# Patient Record
Sex: MALE | Race: WHITE | NOT HISPANIC OR LATINO | Employment: OTHER | URBAN - METROPOLITAN AREA
[De-identification: names, ages, dates, MRNs, and addresses within clinical notes are randomized per-mention and may not be internally consistent; named-entity substitution may affect disease eponyms.]

---

## 2018-07-02 DIAGNOSIS — I10 ESSENTIAL HYPERTENSION, BENIGN: ICD-10-CM

## 2018-07-02 DIAGNOSIS — I48.0 PAROXYSMAL ATRIAL FIBRILLATION (HCC): Primary | ICD-10-CM

## 2018-07-04 RX ORDER — AMLODIPINE BESYLATE 5 MG/1
TABLET ORAL
Qty: 180 TABLET | Refills: 0 | Status: SHIPPED | OUTPATIENT
Start: 2018-07-04 | End: 2018-09-12 | Stop reason: SDUPTHER

## 2018-07-04 RX ORDER — LISINOPRIL 20 MG/1
TABLET ORAL
Qty: 180 TABLET | Refills: 0 | Status: SHIPPED | OUTPATIENT
Start: 2018-07-04 | End: 2018-09-12 | Stop reason: SDUPTHER

## 2018-08-11 ENCOUNTER — TRANSCRIBE ORDERS (OUTPATIENT)
Dept: ADMINISTRATIVE | Facility: HOSPITAL | Age: 63
End: 2018-08-11

## 2018-08-11 ENCOUNTER — APPOINTMENT (OUTPATIENT)
Dept: LAB | Facility: HOSPITAL | Age: 63
End: 2018-08-11
Payer: COMMERCIAL

## 2018-08-11 DIAGNOSIS — I10 ESSENTIAL HYPERTENSION, MALIGNANT: ICD-10-CM

## 2018-08-11 DIAGNOSIS — G89.29 CHRONIC RIGHT SHOULDER PAIN: ICD-10-CM

## 2018-08-11 DIAGNOSIS — M25.511 CHRONIC RIGHT SHOULDER PAIN: ICD-10-CM

## 2018-08-11 DIAGNOSIS — E55.9 VITAMIN D DEFICIENCY DISEASE: Primary | ICD-10-CM

## 2018-08-11 DIAGNOSIS — E78.2 MIXED HYPERLIPIDEMIA: ICD-10-CM

## 2018-08-11 DIAGNOSIS — R35.1 NOCTURIA: ICD-10-CM

## 2018-08-11 DIAGNOSIS — E55.9 VITAMIN D DEFICIENCY DISEASE: ICD-10-CM

## 2018-08-11 DIAGNOSIS — F41.9 ANXIETY DISORDER, UNSPECIFIED TYPE: ICD-10-CM

## 2018-08-11 DIAGNOSIS — I48.0 PAROXYSMAL ATRIAL FIBRILLATION (HCC): ICD-10-CM

## 2018-08-11 LAB
25(OH)D3 SERPL-MCNC: 66.5 NG/ML (ref 30–100)
ALBUMIN SERPL BCP-MCNC: 4.1 G/DL (ref 3–5.2)
ALP SERPL-CCNC: 73 U/L (ref 43–122)
ALT SERPL W P-5'-P-CCNC: 25 U/L (ref 9–52)
ANION GAP SERPL CALCULATED.3IONS-SCNC: 7 MMOL/L (ref 5–14)
AST SERPL W P-5'-P-CCNC: 13 U/L (ref 17–59)
BASOPHILS # BLD AUTO: 0.1 THOUSANDS/ΜL (ref 0–0.1)
BASOPHILS NFR BLD AUTO: 1 % (ref 0–1)
BILIRUB SERPL-MCNC: 0.9 MG/DL
BILIRUB UR QL STRIP: NEGATIVE
BUN SERPL-MCNC: 16 MG/DL (ref 5–25)
CALCIUM SERPL-MCNC: 9.5 MG/DL (ref 8.4–10.2)
CHLORIDE SERPL-SCNC: 105 MMOL/L (ref 97–108)
CHOLEST SERPL-MCNC: 115 MG/DL
CLARITY UR: CLEAR
CO2 SERPL-SCNC: 30 MMOL/L (ref 22–30)
COLOR UR: YELLOW
CREAT SERPL-MCNC: 0.63 MG/DL (ref 0.7–1.5)
EOSINOPHIL # BLD AUTO: 0.2 THOUSAND/ΜL (ref 0–0.4)
EOSINOPHIL NFR BLD AUTO: 2 % (ref 0–6)
ERYTHROCYTE [DISTWIDTH] IN BLOOD BY AUTOMATED COUNT: 13.2 %
ERYTHROCYTE [SEDIMENTATION RATE] IN BLOOD: 5 MM/HOUR (ref 1–20)
GFR SERPL CREATININE-BSD FRML MDRD: 106 ML/MIN/1.73SQ M
GLUCOSE P FAST SERPL-MCNC: 96 MG/DL (ref 70–99)
GLUCOSE UR STRIP-MCNC: NEGATIVE MG/DL
HCT VFR BLD AUTO: 47.7 % (ref 41–53)
HDLC SERPL-MCNC: 22 MG/DL (ref 40–59)
HGB BLD-MCNC: 16.4 G/DL (ref 13.5–17.5)
HGB UR QL STRIP.AUTO: NEGATIVE
KETONES UR STRIP-MCNC: NEGATIVE MG/DL
LDLC SERPL CALC-MCNC: 77 MG/DL
LEUKOCYTE ESTERASE UR QL STRIP: NEGATIVE
LYMPHOCYTES # BLD AUTO: 1.8 THOUSANDS/ΜL (ref 0.5–4)
LYMPHOCYTES NFR BLD AUTO: 18 % (ref 20–50)
MAGNESIUM SERPL-MCNC: 2.3 MG/DL (ref 1.6–2.3)
MCH RBC QN AUTO: 32.4 PG (ref 26–34)
MCHC RBC AUTO-ENTMCNC: 34.4 G/DL (ref 31–36)
MCV RBC AUTO: 94 FL (ref 80–100)
MONOCYTES # BLD AUTO: 0.6 THOUSAND/ΜL (ref 0.2–0.9)
MONOCYTES NFR BLD AUTO: 6 % (ref 1–10)
NEUTROPHILS # BLD AUTO: 7 THOUSANDS/ΜL (ref 1.8–7.8)
NEUTS SEG NFR BLD AUTO: 73 % (ref 45–65)
NITRITE UR QL STRIP: NEGATIVE
NONHDLC SERPL-MCNC: 93 MG/DL
NT-PROBNP SERPL-MCNC: 291 PG/ML (ref 0–299)
PH UR STRIP.AUTO: 7 [PH] (ref 4.5–8)
PLATELET # BLD AUTO: 325 THOUSANDS/UL (ref 150–450)
PMV BLD AUTO: 7.9 FL (ref 8.9–12.7)
POTASSIUM SERPL-SCNC: 4.5 MMOL/L (ref 3.6–5)
PROT SERPL-MCNC: 7.6 G/DL (ref 5.9–8.4)
PROT UR STRIP-MCNC: NEGATIVE MG/DL
PSA SERPL-MCNC: 1.8 NG/ML (ref 0–4)
RBC # BLD AUTO: 5.06 MILLION/UL (ref 4.5–5.9)
SODIUM SERPL-SCNC: 142 MMOL/L (ref 137–147)
SP GR UR STRIP.AUTO: 1.01 (ref 1–1.04)
TRIGL SERPL-MCNC: 79 MG/DL
TSH SERPL DL<=0.05 MIU/L-ACNC: 0.6 UIU/ML (ref 0.47–4.68)
URATE SERPL-MCNC: 5.8 MG/DL (ref 3.5–8.5)
UROBILINOGEN UA: NEGATIVE MG/DL
WBC # BLD AUTO: 9.7 THOUSAND/UL (ref 4.5–11)

## 2018-08-11 PROCEDURE — 85652 RBC SED RATE AUTOMATED: CPT

## 2018-08-11 PROCEDURE — 84550 ASSAY OF BLOOD/URIC ACID: CPT

## 2018-08-11 PROCEDURE — 82306 VITAMIN D 25 HYDROXY: CPT

## 2018-08-11 PROCEDURE — 83880 ASSAY OF NATRIURETIC PEPTIDE: CPT

## 2018-08-11 PROCEDURE — 80053 COMPREHEN METABOLIC PANEL: CPT

## 2018-08-11 PROCEDURE — 84443 ASSAY THYROID STIM HORMONE: CPT

## 2018-08-11 PROCEDURE — 80061 LIPID PANEL: CPT

## 2018-08-11 PROCEDURE — 82542 COL CHROMOTOGRAPHY QUAL/QUAN: CPT

## 2018-08-11 PROCEDURE — 83735 ASSAY OF MAGNESIUM: CPT

## 2018-08-11 PROCEDURE — 81003 URINALYSIS AUTO W/O SCOPE: CPT

## 2018-08-11 PROCEDURE — G0103 PSA SCREENING: HCPCS

## 2018-08-11 PROCEDURE — 85025 COMPLETE CBC W/AUTO DIFF WBC: CPT

## 2018-08-11 PROCEDURE — 36415 COLL VENOUS BLD VENIPUNCTURE: CPT

## 2018-08-13 LAB — FLECAINIDE SERPL-MCNC: 0.16 UG/ML (ref 0.2–1)

## 2018-09-12 DIAGNOSIS — I48.0 PAROXYSMAL ATRIAL FIBRILLATION (HCC): ICD-10-CM

## 2018-09-12 DIAGNOSIS — I10 ESSENTIAL HYPERTENSION, BENIGN: ICD-10-CM

## 2018-09-13 DIAGNOSIS — B00.9 HERPES INFECTION: Primary | ICD-10-CM

## 2018-09-13 RX ORDER — AMLODIPINE BESYLATE 5 MG/1
TABLET ORAL
Qty: 180 TABLET | Refills: 0 | Status: SHIPPED | OUTPATIENT
Start: 2018-09-13 | End: 2019-09-23 | Stop reason: SDUPTHER

## 2018-09-13 RX ORDER — FLECAINIDE ACETATE 100 MG/1
TABLET ORAL
Qty: 180 TABLET | Refills: 0 | Status: SHIPPED | OUTPATIENT
Start: 2018-09-13 | End: 2019-09-23 | Stop reason: SDUPTHER

## 2018-09-13 RX ORDER — LISINOPRIL 20 MG/1
TABLET ORAL
Qty: 180 TABLET | Refills: 0 | Status: SHIPPED | OUTPATIENT
Start: 2018-09-13 | End: 2019-09-23 | Stop reason: SDUPTHER

## 2018-09-13 NOTE — TELEPHONE ENCOUNTER
Patient's left voice message requesting a medication refill for Valtrex 1 gram tablets  He said that Dr Yeny Chavis use to give him this medication a little while ago  Any questions the patient can be at [phone]

## 2018-09-14 RX ORDER — VALACYCLOVIR HYDROCHLORIDE 1 G/1
1000 TABLET, FILM COATED ORAL 2 TIMES DAILY
Qty: 180 TABLET | Refills: 0 | Status: SHIPPED | OUTPATIENT
Start: 2018-09-14 | End: 2018-09-17 | Stop reason: SDUPTHER

## 2018-09-17 DIAGNOSIS — I48.0 PAROXYSMAL ATRIAL FIBRILLATION (HCC): ICD-10-CM

## 2018-09-17 DIAGNOSIS — B00.9 HERPES INFECTION: ICD-10-CM

## 2018-09-17 DIAGNOSIS — I10 ESSENTIAL HYPERTENSION, BENIGN: ICD-10-CM

## 2018-09-17 RX ORDER — ACYCLOVIR 50 MG/G
OINTMENT TOPICAL
Qty: 15 G | Refills: 1 | Status: CANCELLED | OUTPATIENT
Start: 2018-09-17

## 2018-09-18 RX ORDER — VALACYCLOVIR HYDROCHLORIDE 1 G/1
1000 TABLET, FILM COATED ORAL 2 TIMES DAILY
Qty: 180 TABLET | Refills: 1 | Status: SHIPPED | OUTPATIENT
Start: 2018-09-18 | End: 2018-12-17

## 2018-09-25 RX ORDER — LORAZEPAM 1 MG/1
TABLET ORAL EVERY 12 HOURS
COMMUNITY
Start: 2017-11-27 | End: 2018-11-12 | Stop reason: SDUPTHER

## 2018-09-25 RX ORDER — CYANOCOBALAMIN (VITAMIN B-12) 500 MCG
TABLET ORAL
COMMUNITY
End: 2018-09-26 | Stop reason: ALTCHOICE

## 2018-09-25 RX ORDER — KETOCONAZOLE 20 MG/G
CREAM TOPICAL
COMMUNITY
Start: 2018-07-02 | End: 2018-09-26 | Stop reason: ALTCHOICE

## 2018-09-25 RX ORDER — APIXABAN 5 MG/1
TABLET, FILM COATED ORAL
COMMUNITY
Start: 2018-08-27 | End: 2018-09-26 | Stop reason: SDUPTHER

## 2018-09-25 RX ORDER — UBIDECARENONE 75 MG
CAPSULE ORAL
COMMUNITY
Start: 2015-07-01

## 2018-09-25 RX ORDER — BIOTIN 1 MG
5000 TABLET ORAL
COMMUNITY
Start: 2016-07-20

## 2018-09-25 RX ORDER — ASPIRIN 81 MG/1
TABLET ORAL EVERY 24 HOURS
COMMUNITY
Start: 2015-07-29 | End: 2020-04-09

## 2018-09-25 RX ORDER — ATORVASTATIN CALCIUM 40 MG/1
TABLET, FILM COATED ORAL
COMMUNITY
Start: 2018-07-02 | End: 2019-04-10 | Stop reason: SDUPTHER

## 2018-09-26 ENCOUNTER — OFFICE VISIT (OUTPATIENT)
Dept: CARDIOLOGY CLINIC | Facility: CLINIC | Age: 63
End: 2018-09-26
Payer: COMMERCIAL

## 2018-09-26 VITALS
DIASTOLIC BLOOD PRESSURE: 78 MMHG | HEART RATE: 59 BPM | OXYGEN SATURATION: 100 % | BODY MASS INDEX: 29.82 KG/M2 | HEIGHT: 73 IN | WEIGHT: 225 LBS | SYSTOLIC BLOOD PRESSURE: 118 MMHG

## 2018-09-26 DIAGNOSIS — J01.00 ACUTE NON-RECURRENT MAXILLARY SINUSITIS: ICD-10-CM

## 2018-09-26 DIAGNOSIS — E78.2 MIXED HYPERLIPIDEMIA: ICD-10-CM

## 2018-09-26 DIAGNOSIS — I10 ESSENTIAL HYPERTENSION, BENIGN: ICD-10-CM

## 2018-09-26 DIAGNOSIS — F41.9 ANXIETY: ICD-10-CM

## 2018-09-26 DIAGNOSIS — I48.0 PAROXYSMAL ATRIAL FIBRILLATION (HCC): Primary | ICD-10-CM

## 2018-09-26 PROCEDURE — 99214 OFFICE O/P EST MOD 30 MIN: CPT | Performed by: INTERNAL MEDICINE

## 2018-09-26 PROCEDURE — 93000 ELECTROCARDIOGRAM COMPLETE: CPT | Performed by: INTERNAL MEDICINE

## 2018-09-26 RX ORDER — AMOXICILLIN AND CLAVULANATE POTASSIUM 875; 125 MG/1; MG/1
1 TABLET, FILM COATED ORAL EVERY 12 HOURS SCHEDULED
Qty: 14 TABLET | Refills: 1 | Status: SHIPPED | OUTPATIENT
Start: 2018-09-26 | End: 2018-10-03

## 2018-09-26 RX ORDER — APIXABAN 5 MG/1
5 TABLET, FILM COATED ORAL 2 TIMES DAILY
Qty: 60 TABLET | Refills: 11 | Status: SHIPPED | OUTPATIENT
Start: 2018-09-26 | End: 2019-07-17 | Stop reason: SDUPTHER

## 2018-09-26 NOTE — ASSESSMENT & PLAN NOTE
Blood pressure well controlled on current regimen with amlodipine, lisinopril and metoprolol tartrate  We will continue current medical therapy

## 2018-09-26 NOTE — ASSESSMENT & PLAN NOTE
Is on atorvastatin  Lipids are well controlled  LFTs are normal      will continue current medical therapy

## 2018-09-26 NOTE — LETTER
September 26, 2018     Referral [facility]    Patient: Asuncion Weber   YOB: 1955   Date of Visit: 9/26/2018       Dear Dr Argentina Cho:    Thank you for referring Asuncion Weber to me for evaluation  Below are my notes for this consultation  If you have questions, please do not hesitate to call me  I look forward to following your patient along with you  Sincerely,        Carlita Dietz MD        CC: MD Carlita Levy MD  9/26/2018 10:51 AM  [facility]   [facility]   [address] [place] [address] [address]   [address]  [id number]  Phone#  [phone]  Fax#  [phone]  *-*-*-*-*-*-*-*-*-*-*-*-*-*-*-*-*-*-*-*-*-*-*-*-*-*-*-*-*-*-*-*-*-*-*-*-*-*-*-*-*-*-*-*-*-*-*-*-*-*-*-*-*-*    Devere Oas DATE: 09/26/18 10:37 AM    PATIENT NAME: Asuncion Weber   1955    [id number]  Age: 58 y o  Sex: male    AUTHOR: Carlita Dietz MD  PRIMARYCARE PHYSICIAN: Maritza Will MD    DIAGNOSES:  1  Paroxysmal atrial fibrillation status post cardioversion in 2015   2  Mixed dyslipidemia  3  Benign essential hypertension  4  Chronic tobacco use  5  Suspected sleep apnea    Persantine nuclear stress test in July 2015 showed normal perfusion and normal left ventricular systolic function with mildly dilated left ventricular cavity, EF was 58%  Echocardiogram June 2018:  - Mild left ventricular cavity enlargement, normal left  ventricle systolic function and normal diastolic function  EF  approximately 50-55%  - Mild right atrial and mild to moderate left atrial  enlargement  - Aortic valve leaflet sclerosis  No stenosis or regurgitation   - Mitral valve leaflet sclerosis  Trace mitral valve  regurgitation   - Mild tricuspid and pulmonic valve regurgitation   - No pericardial effusion   - No pulmonary hypertension       Transesophageal echocardiogram in June 2015 showed mild-to-moderate left atrial enlargement, borderline reduced left ventricular systolic function  CURRENT ECG:  Results for orders placed or performed in visit on 09/26/18   POCT ECG    Narrative     Sinus bradycardia at 59 beats per minute, normal axis and intervals, QRS duration 98 milliseconds,  milliseconds, no significant ST T wave abnormalities  Slight  Sinus arrhythmia noted  A       CARDIOLOGY ASSESSMENT & PLAN:  No problem-specific Assessment & Plan notes found for this encounter  INTERVAL HISTORY & HISTORY OF PRESENT ILLNESS:  Glenny Albrecht is here for follow-up regarding his cardiac comorbidities which include:  A history of paroxysmal atrial fibrillation status post most recent cardioversion in 2015, history of hypertension and dyslipidemia  He reports overall feeling well with no recent subjective cardiac complaints  Denies any recent chest pain, shortness of breath, dizziness, lightheadedness, orthopnea, PND, pedal edema, presyncope / syncope, or decline in exercise tolerance  Also denies any recent hospitalizations or other illnesses  Reports being compliant with medications  He has lost 7 lb recently and he feels good  He continues to smoke says about 8 cigarettes a day  REVIEW OF SYMPTOMS:    Positive for:  No significant symptoms  Negative for: All remaining as reviewed below and in HPI  SYSTEM SYMPTOMS REVIEWED:  Generalweight change, fever, night sweats  Respirato      Cardiovascularchest pain, syncope, dyspnea on exertion, edema, decline in exercise tolerance, claudication   Gastrointestinalpersistent vomiting, diarrhea, abdominal distention, blood in stool   Muscular or skeletaljoint pain or swelling   Neurologicheadaches, syncope, abnormal movement  Hematologichistory of easy bruising and bleeding   Endocrinethyroid enlargement, heat or cold intolerance, polyuria   Psychiatricanxiety, depression     *-*-*-*-*-*-*-*-*-*-*-*-*-*-*-*-*-*-*-*-*-*-*-*-*-*-*-*-*-*-*-*-*-*-*-*-*-*-*-*-*-*-*-*-*-*-*-*-*-*-*-*-*-*-  VITAL SIGNS:  Vitals:    09/26/18 0947   BP: 118/78   BP Location: Left arm   Patient Position: Sitting   Cuff Size: Large   Pulse: 59   SpO2: 100%   Weight: 102 kg (225 lb)   Height: 6' 1" (1 854 m)       *-*-*-*-*-*-*-*-*-*-*-*-*-*-*-*-*-*-*-*-*-*-*-*-*-*-*-*-*-*-*-*-*-*-*-*-*-*-*-*-*-*-*-*-*-*-*-*-*-*-*-*-*-*-  PHYSICAL EXAM:  General Appearance:    Alert, cooperative, no distress, appears stated age, tall normal built   Head, Eyes, ENT:    No obvious abnormality, moist mucous mebranes  Neck:   Supple, no carotid bruit or JVD   Back:     Symmetric, no curvature  Lungs:     Respirations unlabored  Clear to auscultation bilaterally,    Chest wall:    No tenderness or deformity   Heart:    Regular rate and rhythm, S1 and S2 normal, no murmur, rub  or gallop  Abdomen:     Soft, non-tender, No obvious masses, or organomegaly   Extremities:   Extremities normal, no cyanosis or edema    Skin:   Skin color, texture, turgor normal, no rashes or lesions     *-*-*-*-*-*-*-*-*-*-*-*-*-*-*-*-*-*-*-*-*-*-*-*-*-*-*-*-*-*-*-*-*-*-*-*-*-*-*-*-*-*-*-*-*-*-*-*-*-*-*-*-*-*-  CURRENT MEDICATION LIST:    Current Outpatient Prescriptions:     amLODIPine (NORVASC) 5 mg tablet, TAKE 1 TABLET BY MOUTH TWICE DAILY  , Disp: 180 tablet, Rfl: 0    aspirin (ASPIR-81) 81 mg EC tablet, every 24 hours, Disp: , Rfl:     atorvastatin (LIPITOR) 40 mg tablet, , Disp: , Rfl:     Cholecalciferol (VITAMIN D3) 1000 units CAPS, take 1 tablet by oral route  every morning, Disp: , Rfl:     coenzyme Q-10 100 MG capsule, Take one tablet by mouth daily  , Disp: , Rfl:     Cyanocobalamin ER (SV VITAMIN B-12 ER) 1000 MCG TBCR, 1 daily, Disp: , Rfl:     ELIQUIS 5 MG, , Disp: , Rfl:     flecainide (TAMBOCOR) 100 mg tablet, TAKE ONE TABLET BY MOUTH EVERY 12 HOURS, Disp: 180 tablet, Rfl: 0    lisinopril (ZESTRIL) 20 mg tablet, TAKE 1 TABLET BY MOUTH TWICE DAILY  , Disp: 180 tablet, Rfl: 0    LORazepam (ATIVAN) 1 mg tablet, Every 12 hours, Disp: , Rfl:    metoprolol tartrate (LOPRESSOR) 25 mg tablet, TAKE ONE TABLET BY MOUTH 2 TIMES A DAY, Disp: 180 tablet, Rfl: 0    Multiple Vitamins-Minerals (MULTIVITAMIN ADULT PO), take 1 by Oral route once, Disp: , Rfl:     selenium 200 mcg, take one and a half tablets every day, Disp: , Rfl:     valACYclovir (VALTREX) 1,000 mg tablet, Take 1 tablet (1,000 mg total) by mouth 2 (two) times a day for 90 days (Patient taking differently: Take 1,000 mg by mouth 2 (two) times a day  ), Disp: 180 tablet, Rfl: 1    ALLERGIES:  Allergies   Allergen Reactions    No Active Allergies        *-*-*-*-*-*-*-*-*-*-*-*-*-*-*-*-*-*-*-*-*-*-*-*-*-*-*-*-*-*-*-*-*-*-*-*-*-*-*-*-*-*-*-*-*-*-*-*-*-*-*-*-*-*-    LABORATORY DATA:  I have personally reviewed pertinent labs  Sodium   Date Value Ref Range Status   08/11/2018 142 137 - 147 mmol/L Final     Potassium   Date Value Ref Range Status   08/11/2018 4 5 3 6 - 5 0 mmol/L Final     Chloride   Date Value Ref Range Status   08/11/2018 105 97 - 108 mmol/L Final     CO2   Date Value Ref Range Status   08/11/2018 30 22 - 30 mmol/L Final     BUN   Date Value Ref Range Status   08/11/2018 16 5 - 25 mg/dL Final     Creatinine   Date Value Ref Range Status   08/11/2018 0 63 (L) 0 70 - 1 50 mg/dL Final     Comment:     Standardized to IDMS reference method     eGFR   Date Value Ref Range Status   08/11/2018 106 >60 ml/min/1 73sq m Final     Calcium   Date Value Ref Range Status   08/11/2018 9 5 8 4 - 10 2 mg/dL Final     AST   Date Value Ref Range Status   08/11/2018 13 (L) 17 - 59 U/L Final     Comment:       Specimen collection should occur prior to Sulfasalazine administration due to the potential for falsely depressed results  ALT   Date Value Ref Range Status   08/11/2018 25 9 - 52 U/L Final     Comment:       Specimen collection should occur prior to Sulfasalazine administration due to the potential for falsely depressed results        Alkaline Phosphatase   Date Value Ref Range Status 08/11/2018 73 43 - 122 U/L Final     Magnesium   Date Value Ref Range Status   08/11/2018 2 3 1 6 - 2 3 mg/dL Final     WBC   Date Value Ref Range Status   08/11/2018 9 70 4 50 - 11 00 Thousand/uL Final     Hemoglobin   Date Value Ref Range Status   08/11/2018 16 4 13 5 - 17 5 g/dL Final     Platelets   Date Value Ref Range Status   08/11/2018 325 150 - 450 Thousands/uL Final     No results found for: PT, PTT, INR  No results found for: CKMB, BNP, DIGOXIN  No results found for: TSH, T4, T3  HDL, Direct   Date Value Ref Range Status   08/11/2018 22 (L) 40 - 59 mg/dL Final     Comment:       HDL Cholesterol:       High    >60 mg/dL       Low     <41 mg/dL  Specimen collection should occur prior to Metamizole administration due to the potential for falsley depressed results  Triglycerides   Date Value Ref Range Status   08/11/2018 79 <150 mg/dL Final     Comment:       Triglyceride:     Normal          <150 mg/dl     Borderline High 150-199 mg/dl     High            200-499 mg/dl        Very High       >499 mg/dl    Specimen collection should occur prior to N-Acetylcysteine or Metamizole administration due to the potential for falsely depressed results        No results found for: HGBA1C  No results found for: Beauty Peon, GRAMSTAIN, URINECX, WOUNDCULT, BODYFLUIDCUL, MRSACULTURE, INFLUAPCR, INFLUBPCR, RSVPCR, LEGIONELLAUR, CDIFFTOXINB    *-*-*-*-*-*-*-*-*-*-*-*-*-*-*-*-*-*-*-*-*-*-*-*-*-*-*-*-*-*-*-*-*-*-*-*-*-*-*-*-*-*-*-*-*-*-*-*-*-*-*-*-*-*-  PREVIOUS CARDIOLOGY & RADIOLOGY RESULTS:  Results for orders placed in visit on 06/19/18   Echo complete with contrast if indicated    Narrative [id number] [place] [address], [phone]    Cardiovascular Report  _________________________________________________________________         Transthoracic Echocardiogram Report  Estheraddison Silveira    MRN:                 [id number]  Account :           [de-identified]  Accession Brain Carlos [id number]  Exam Date:           2018 10:29  Patient Location:    O  Ordering Phys:       NANCY Ferguson)  Attending Phys:      NANCY Ferguson    (Barrett Wilder)  Technologist:        Leah Sheehan    Age:  58             :   1955           Gender:   M  Wt:   236 lb         Ht:    73 in  Indication:  Atrial fibrillation,   Hypertension  BP:         /       HR:    Rhythm:              sinus  Technical Quality:   good      MEASUREMENTS  2D ECHO  Body Surface Area                 2 4 m²  LV Diastolic Diameter PLAX        6 2 cm  LV Systolic Diameter PLAX         4 2 cm  IVS Diastolic Thickness           1 2 cm  LVPW Diastolic Thickness          1 1 cm  LV Relative Wall Thickness        0 4  RV Internal Dim ED PLAX           4 4 cm  Aortic Root Diameter              3 2 cm  LA Systolic Diameter LX           4 8 cm  LA Area                           27 0 cm²  LV Ejection Fraction MOD 4C       60 4 %  RA Area 4C View                   25 0 cm²    DOPPLER  MV Area PHT                       3 5 cm²  Mitral E Point Velocity           90 0 cm/s  Mitral A Point Velocity           60 6 cm/s  Mitral E to A Ratio               1 5  MV E' Velocity                    7 3 cm/s  Mitral E to MV E' Ratio           12 4  TR Peak Velocity                  272 8 cm/s  TR Peak Gradient                  29 8 mmHg  TAPSE                             2 4 cm      FINDINGS  Left Ventricle  Mildly enlarged left ventricle cavity, mild concentric left  ventricular hypertrophy, low-normal left ventricular systolic  function and wall motion  Ejection fraction is estimated at  around 50-55%  Normal diastolic function  Right Ventricle  Normal right ventricle size and systolic function  Right Atrium  Mild right atrial cavity enlargement  Left Atrium  Mild approaching moderate left atrial cavity enlargement  Intact  interatrial septum  Mitral Valve  Mild anterior mitral valve leaflet sclerosis   Trace mitral valve  regurgitation  Aortic Valve  Trileaflet aortic valve with mild sclerosis  No aortic valve  stenosis or regurgitation  Tricuspid Valve  Mild tricuspid valve regurgitation  Pulmonic Valve  Trace to mild pulmonic valve regurgitation  Pericardium  No pericardial effusion  Aorta  Aortic root and proximal ascending aorta are normal in size on  2-D imaging  Additional Findings  Inferior vena cava is normal in size and demonstrates over 50%  collapse with respirations  CONCLUSIONS  - Mild left ventricular cavity enlargement, normal left  ventricle systolic function and normal diastolic function  EF  approximately 50-55%  - Mild right atrial and mild to moderate left atrial  enlargement  - Aortic valve leaflet sclerosis  No stenosis or regurgitation   - Mitral valve leaflet sclerosis  Trace mitral valve  regurgitation   - Mild tricuspid and pulmonic valve regurgitation   - No pericardial effusion   - No pulmonary hypertension  Compared to previous echocardiogram from June 2015 there is  overall no significant change  Ather J Luis  (Electronically Signed)  Final Date:      19 June 2018 18:16  CV Report                    CECILLE OWENS       [id number]  Final                                                     Page 3     No results found for this or any previous visit  No results found for this or any previous visit  No results found for this or any previous visit  Patient was never admitted       *-*-*-*-*-*-*-*-*-*-*-*-*-*-*-*-*-*-*-*-*-*-*-*-*-*-*-*-*-*-*-*-*-*-*-*-*-*-*-*-*-*-*-*-*-*-*-*-*-*-*-*-*-*-  SIGNATURES:   @KYMARIA L@   Charlene Foote MD     CC:   Gisela Tavera MD   Self, Referral   *-*-*-*-*-*-*-*-*-*-*-*-*-*-*-*-*-*-*-*-*-*-*-*-*-*-*-*-*-*-*-*-*-*-*-*-*-*-*-*-*-*-*-*-*-*-*-*-*-*-*-*-*-*-    Social History     Social History    Marital status: /Civil Union     Spouse name: N/A    Number of children: N/A    Years of education: N/A     Occupational History    Not on file      Social History Main Topics    Smoking status: Former Smoker     Packs/day: 20 00     Types: Cigarettes     Quit date: 1/1/2014    Smokeless tobacco: Not on file      Comment: 800 pk yrs as per [employer]    Alcohol use Not on file    Drug use: Unknown    Sexual activity: Not on file     Other Topics Concern    Not on file     Social History Narrative    No narrative on file      Family History   Problem Relation Age of Onset    Heart attack Father 61        MI    Heart attack Paternal Uncle         MI     Past Surgical History:   Procedure Laterality Date    CARDIOVERSION  06/28/2015

## 2018-09-26 NOTE — PATIENT INSTRUCTIONS
CARDIOLOGY ASSESSMENT & PLAN:  Paroxysmal atrial fibrillation (HCC)    He continues to maintain sinus rhythm  Is on flecainide at 100 mg twice daily dose per along with metoprolol  Is on anticoagulation with Eliquis and is tolerating it  ECG today is unremarkable in terms of QT effects of flecainide     - Patient is advised to continue current medical therapy  - Dietary and medical compliance are reinforced  - Advised  to report any concerning symptoms such as chest pain, shortness of breath, decline in exercise tolerance or presyncope/syncope  Describing a course of Augmentin for suspected sinusitis with bronchitis  Essential hypertension, benign    Blood pressure well controlled on current regimen with amlodipine, lisinopril and metoprolol tartrate  We will continue current medical therapy  Mixed hyperlipidemia    Is on atorvastatin  Lipids are well controlled  LFTs are normal      will continue current medical therapy

## 2018-09-26 NOTE — ASSESSMENT & PLAN NOTE
He continues to maintain sinus rhythm  Is on flecainide at 100 mg twice daily dose per along with metoprolol  Is on anticoagulation with Eliquis and is tolerating it  ECG today is unremarkable in terms of QT effects of flecainide     - Patient is advised to continue current medical therapy  - Dietary and medical compliance are reinforced  - Advised  to report any concerning symptoms such as chest pain, shortness of breath, decline in exercise tolerance or presyncope/syncope  Describing a course of Augmentin for suspected sinusitis with bronchitis

## 2018-09-26 NOTE — PROGRESS NOTES
[address]    [address], [address]   [address]  [address]  Phone#  [phone]  Fax#  [phone]  *-*-*-*-*-*-*-*-*-*-*-*-*-*-*-*-*-*-*-*-*-*-*-*-*-*-*-*-*-*-*-*-*-*-*-*-*-*-*-*-*-*-*-*-*-*-*-*-*-*-*-*-*-*    Nicolasa Pain DATE: 09/26/18 10:57 AM    PATIENT NAME: Roselyn Tobar   1955    [id number]  Age: 58 y o  Sex: male    AUTHOR: Carlita Dietz MD  PRIMARYCARE PHYSICIAN: Maribeth Hale MD    DIAGNOSES:  1  Paroxysmal atrial fibrillation status post cardioversion in 2015   2  Mixed dyslipidemia  3  Benign essential hypertension  4  Chronic tobacco use  5  Suspected sleep apnea    Persantine nuclear stress test in July 2015 showed normal perfusion and normal left ventricular systolic function with mildly dilated left ventricular cavity, EF was 58%  Echocardiogram June 2018:  - Mild left ventricular cavity enlargement, normal left  ventricle systolic function and normal diastolic function  EF  approximately 50-55%  - Mild right atrial and mild to moderate left atrial  enlargement  - Aortic valve leaflet sclerosis  No stenosis or regurgitation   - Mitral valve leaflet sclerosis  Trace mitral valve  regurgitation   - Mild tricuspid and pulmonic valve regurgitation   - No pericardial effusion   - No pulmonary hypertension  Transesophageal echocardiogram in June 2015 showed mild-to-moderate left atrial enlargement, borderline reduced left ventricular systolic function  CURRENT ECG:  Results for orders placed or performed in visit on 09/26/18   POCT ECG    Narrative     Sinus bradycardia at 59 beats per minute, normal axis and intervals, QRS duration 98 milliseconds,  milliseconds, no significant ST T wave abnormalities  Slight  Sinus arrhythmia noted  A       CARDIOLOGY ASSESSMENT & PLAN:  Paroxysmal atrial fibrillation (HCC)    He continues to maintain sinus rhythm  Is on flecainide at 100 mg twice daily dose per along with metoprolol    Is on anticoagulation with Eliquis and is tolerating it  ECG today is unremarkable in terms of QT effects of flecainide     - Patient is advised to continue current medical therapy  - Dietary and medical compliance are reinforced  - Advised  to report any concerning symptoms such as chest pain, shortness of breath, decline in exercise tolerance or presyncope/syncope  Describing a course of Augmentin for suspected sinusitis with bronchitis  Essential hypertension, benign    Blood pressure well controlled on current regimen with amlodipine, lisinopril and metoprolol tartrate  We will continue current medical therapy  Mixed hyperlipidemia    Is on atorvastatin  Lipids are well controlled  LFTs are normal      will continue current medical therapy  INTERVAL HISTORY & HISTORY OF PRESENT ILLNESS:  Christie Shafer is here for follow-up regarding his cardiac comorbidities which include:  A history of paroxysmal atrial fibrillation status post most recent cardioversion in 2015, history of hypertension and dyslipidemia  He reports overall feeling well with no recent subjective cardiac complaints  Denies any recent chest pain, shortness of breath, dizziness, lightheadedness, orthopnea, PND, pedal edema, presyncope / syncope, or decline in exercise tolerance  Also denies any recent hospitalizations or other illnesses  Reports being compliant with medications  He has lost 7 lb recently and he feels good  He continues to smoke says about 8 cigarettes a day  He does report recent URI symptoms with sinus congestion and headache the the along with productive sputum    REVIEW OF SYMPTOMS:    Positive for:   URI and sinusitis symptoms  Negative for: All remaining as reviewed below and in HPI  SYSTEM SYMPTOMS REVIEWED:  General--weight change, fever, night sweats  Respirato      Cardiovascular--chest pain, syncope, dyspnea on exertion, edema, decline in exercise tolerance, claudication Gastrointestinal--persistent vomiting, diarrhea, abdominal distention, blood in stool   Muscular or skeletal--joint pain or swelling   Neurologic--headaches, syncope, abnormal movement  Hematologic--history of easy bruising and bleeding   Endocrine--thyroid enlargement, heat or cold intolerance, polyuria   Psychiatric--anxiety, depression     *-*-*-*-*-*-*-*-*-*-*-*-*-*-*-*-*-*-*-*-*-*-*-*-*-*-*-*-*-*-*-*-*-*-*-*-*-*-*-*-*-*-*-*-*-*-*-*-*-*-*-*-*-*-  VITAL SIGNS:  Vitals:    09/26/18 0947   BP: 118/78   BP Location: Left arm   Patient Position: Sitting   Cuff Size: Large   Pulse: 59   SpO2: 100%   Weight: 102 kg (225 lb)   Height: 6' 1" (1 854 m)       *-*-*-*-*-*-*-*-*-*-*-*-*-*-*-*-*-*-*-*-*-*-*-*-*-*-*-*-*-*-*-*-*-*-*-*-*-*-*-*-*-*-*-*-*-*-*-*-*-*-*-*-*-*-  PHYSICAL EXAM:  General Appearance:    Alert, cooperative, no distress, appears stated age, tall normal built   Head, Eyes, ENT:    No obvious abnormality, moist mucous mebranes  Neck:   Supple, no carotid bruit or JVD   Back:     Symmetric, no curvature  Lungs:     Respirations unlabored  Clear to auscultation bilaterally,    Chest wall:    No tenderness or deformity   Heart:    Regular rate and rhythm, S1 and S2 normal, no murmur, rub  or gallop  Abdomen:     Soft, non-tender, No obvious masses, or organomegaly   Extremities:   Extremities normal, no cyanosis or edema    Skin:   Skin color, texture, turgor normal, no rashes or lesions     *-*-*-*-*-*-*-*-*-*-*-*-*-*-*-*-*-*-*-*-*-*-*-*-*-*-*-*-*-*-*-*-*-*-*-*-*-*-*-*-*-*-*-*-*-*-*-*-*-*-*-*-*-*-  CURRENT MEDICATION LIST:    Current Outpatient Prescriptions:     amLODIPine (NORVASC) 5 mg tablet, TAKE 1 TABLET BY MOUTH TWICE DAILY  , Disp: 180 tablet, Rfl: 0    aspirin (ASPIR-81) 81 mg EC tablet, every 24 hours, Disp: , Rfl:     atorvastatin (LIPITOR) 40 mg tablet, , Disp: , Rfl:     Cholecalciferol (VITAMIN D3) 1000 units CAPS, take 1 tablet by oral route  every morning, Disp: , Rfl:     coenzyme Q-10 100 MG capsule, Take one tablet by mouth daily  , Disp: , Rfl:     Cyanocobalamin ER (SV VITAMIN B-12 ER) 1000 MCG TBCR, 1 daily, Disp: , Rfl:     ELIQUIS 5 MG, Take 1 tablet (5 mg total) by mouth 2 (two) times a day, Disp: 60 tablet, Rfl: 11    flecainide (TAMBOCOR) 100 mg tablet, TAKE ONE TABLET BY MOUTH EVERY 12 HOURS, Disp: 180 tablet, Rfl: 0    lisinopril (ZESTRIL) 20 mg tablet, TAKE 1 TABLET BY MOUTH TWICE DAILY  , Disp: 180 tablet, Rfl: 0    LORazepam (ATIVAN) 1 mg tablet, Every 12 hours, Disp: , Rfl:     metoprolol tartrate (LOPRESSOR) 25 mg tablet, TAKE ONE TABLET BY MOUTH 2 TIMES A DAY, Disp: 180 tablet, Rfl: 0    Multiple Vitamins-Minerals (MULTIVITAMIN ADULT PO), take 1 by Oral route once, Disp: , Rfl:     selenium 200 mcg, take one and a half tablets every day, Disp: , Rfl:     valACYclovir (VALTREX) 1,000 mg tablet, Take 1 tablet (1,000 mg total) by mouth 2 (two) times a day for 90 days (Patient taking differently: Take 1,000 mg by mouth 2 (two) times a day  ), Disp: 180 tablet, Rfl: 1    amoxicillin-clavulanate (AUGMENTIN) 875-125 mg per tablet, Take 1 tablet by mouth every 12 (twelve) hours for 7 days, Disp: 14 tablet, Rfl: 1    ALLERGIES:  Allergies   Allergen Reactions    No Active Allergies        *-*-*-*-*-*-*-*-*-*-*-*-*-*-*-*-*-*-*-*-*-*-*-*-*-*-*-*-*-*-*-*-*-*-*-*-*-*-*-*-*-*-*-*-*-*-*-*-*-*-*-*-*-*-    LABORATORY DATA:  I have personally reviewed pertinent labs      Sodium   Date Value Ref Range Status   08/11/2018 142 137 - 147 mmol/L Final     Potassium   Date Value Ref Range Status   08/11/2018 4 5 3 6 - 5 0 mmol/L Final     Chloride   Date Value Ref Range Status   08/11/2018 105 97 - 108 mmol/L Final     CO2   Date Value Ref Range Status   08/11/2018 30 22 - 30 mmol/L Final     BUN   Date Value Ref Range Status   08/11/2018 16 5 - 25 mg/dL Final     Creatinine   Date Value Ref Range Status   08/11/2018 0 63 (L) 0 70 - 1 50 mg/dL Final     Comment:     Standardized to IDMS reference method     eGFR   Date Value Ref Range Status   08/11/2018 106 >60 ml/min/1 73sq m Final     Calcium   Date Value Ref Range Status   08/11/2018 9 5 8 4 - 10 2 mg/dL Final     AST   Date Value Ref Range Status   08/11/2018 13 (L) 17 - 59 U/L Final     Comment:       Specimen collection should occur prior to Sulfasalazine administration due to the potential for falsely depressed results  ALT   Date Value Ref Range Status   08/11/2018 25 9 - 52 U/L Final     Comment:       Specimen collection should occur prior to Sulfasalazine administration due to the potential for falsely depressed results  Alkaline Phosphatase   Date Value Ref Range Status   08/11/2018 73 43 - 122 U/L Final     Magnesium   Date Value Ref Range Status   08/11/2018 2 3 1 6 - 2 3 mg/dL Final     WBC   Date Value Ref Range Status   08/11/2018 9 70 4 50 - 11 00 Thousand/uL Final     Hemoglobin   Date Value Ref Range Status   08/11/2018 16 4 13 5 - 17 5 g/dL Final     Platelets   Date Value Ref Range Status   08/11/2018 325 150 - 450 Thousands/uL Final     No results found for: PT, PTT, INR  No results found for: CKMB, BNP, DIGOXIN  No results found for: TSH, T4, T3  HDL, Direct   Date Value Ref Range Status   08/11/2018 22 (L) 40 - 59 mg/dL Final     Comment:       HDL Cholesterol:       High    >60 mg/dL       Low     <41 mg/dL  Specimen collection should occur prior to Metamizole administration due to the potential for falsley depressed results  Triglycerides   Date Value Ref Range Status   08/11/2018 79 <150 mg/dL Final     Comment:       Triglyceride:     Normal          <150 mg/dl     Borderline High 150-199 mg/dl     High            200-499 mg/dl        Very High       >499 mg/dl    Specimen collection should occur prior to N-Acetylcysteine or Metamizole administration due to the potential for falsely depressed results        No results found for: HGBA1C  No results found for: Misty Reyna, WOUNDCULT, BODYFLUIDCUL, MRSACULTURE, INFLUAPCR, INFLUBPCR, RSVPCR, LEGIONELLAUR, CDIFFTOXINB    *-*-*-*-*-*-*-*-*-*-*-*-*-*-*-*-*-*-*-*-*-*-*-*-*-*-*-*-*-*-*-*-*-*-*-*-*-*-*-*-*-*-*-*-*-*-*-*-*-*-*-*-*-*-  PREVIOUS CARDIOLOGY & RADIOLOGY RESULTS:  Results for orders placed in visit on 18   Echo complete with contrast if indicated    Narrative [facility] [id number]  [address] , [id number] [phone]    Cardiovascular Report  _________________________________________________________________         Transthoracic Echocardiogram Report  Hector Farnsworth    MRN:                 [id number]  Account :           [de-identified]  Accession :         [id number]  Exam Date:           2018 10:29  Patient Location:    O  Ordering Phys:       NANCY Chin)  Attending Phys:      NANCY Chin    (Melissa Saucedo)  Technologist:        Mendoza Tucker    Age:  58             :   1955           Gender:   M  Wt:   236 lb         Ht:    73 in  Indication:  Atrial fibrillation,   Hypertension  BP:         /       HR:    Rhythm:              sinus  Technical Quality:   good      MEASUREMENTS  2D ECHO  Body Surface Area                 2 4 m²  LV Diastolic Diameter PLAX        6 2 cm  LV Systolic Diameter PLAX         4 2 cm  IVS Diastolic Thickness           1 2 cm  LVPW Diastolic Thickness          1 1 cm  LV Relative Wall Thickness        0 4  RV Internal Dim ED PLAX           4 4 cm  Aortic Root Diameter              3 2 cm  LA Systolic Diameter LX           4 8 cm  LA Area                           27 0 cm²  LV Ejection Fraction MOD 4C       60 4 %  RA Area 4C View                   25 0 cm²    DOPPLER  MV Area PHT                       3 5 cm²  Mitral E Point Velocity           90 0 cm/s  Mitral A Point Velocity           60 6 cm/s  Mitral E to A Ratio               1 5  MV E' Velocity                    7 3 cm/s  Mitral E to MV E' Ratio           12 4  TR Peak Velocity                  272 8 cm/s  TR Peak Gradient                  29 8 mmHg  TAPSE                             2 4 cm      FINDINGS  Left Ventricle  Mildly enlarged left ventricle cavity, mild concentric left  ventricular hypertrophy, low-normal left ventricular systolic  function and wall motion  Ejection fraction is estimated at  around 50-55%  Normal diastolic function  Right Ventricle  Normal right ventricle size and systolic function  Right Atrium  Mild right atrial cavity enlargement  Left Atrium  Mild approaching moderate left atrial cavity enlargement  Intact  interatrial septum  Mitral Valve  Mild anterior mitral valve leaflet sclerosis  Trace mitral valve  regurgitation  Aortic Valve  Trileaflet aortic valve with mild sclerosis  No aortic valve  stenosis or regurgitation  Tricuspid Valve  Mild tricuspid valve regurgitation  Pulmonic Valve  Trace to mild pulmonic valve regurgitation  Pericardium  No pericardial effusion  Aorta  Aortic root and proximal ascending aorta are normal in size on  2-D imaging  Additional Findings  Inferior vena cava is normal in size and demonstrates over 50%  collapse with respirations  CONCLUSIONS  - Mild left ventricular cavity enlargement, normal left  ventricle systolic function and normal diastolic function  EF  approximately 50-55%  - Mild right atrial and mild to moderate left atrial  enlargement  - Aortic valve leaflet sclerosis  No stenosis or regurgitation   - Mitral valve leaflet sclerosis  Trace mitral valve  regurgitation   - Mild tricuspid and pulmonic valve regurgitation   - No pericardial effusion   - No pulmonary hypertension  Compared to previous echocardiogram from June 2015 there is  overall no significant change      Ather J Luis  (Electronically Signed)  Final Date:      19 June 2018 18:16  CV Report                    CECILLE OWENS       [id number]  Final                                                     Page 3 No results found for this or any previous visit  No results found for this or any previous visit  No results found for this or any previous visit  Patient was never admitted  *-*-*-*-*-*-*-*-*-*-*-*-*-*-*-*-*-*-*-*-*-*-*-*-*-*-*-*-*-*-*-*-*-*-*-*-*-*-*-*-*-*-*-*-*-*-*-*-*-*-*-*-*-*-  SIGNATURES:   @IJJ@   Canelo Christie MD     CC:   Lourdes Meade MD   Self, Referral   *-*-*-*-*-*-*-*-*-*-*-*-*-*-*-*-*-*-*-*-*-*-*-*-*-*-*-*-*-*-*-*-*-*-*-*-*-*-*-*-*-*-*-*-*-*-*-*-*-*-*-*-*-*-    Social History     Social History    Marital status: /Civil Union     Spouse name: N/A    Number of children: N/A    Years of education: N/A     Occupational History    Not on file       Social History Main Topics    Smoking status: Former Smoker     Packs/day: 20 00     Types: Cigarettes     Quit date: 1/1/2014    Smokeless tobacco: Not on file      Comment: 800 pk yrs as per [employer]    Alcohol use Not on file    Drug use: Unknown    Sexual activity: Not on file     Other Topics Concern    Not on file     Social History Narrative    No narrative on file      Family History   Problem Relation Age of Onset    Heart attack Father 61        MI    Heart attack Paternal Uncle         MI     Past Surgical History:   Procedure Laterality Date    CARDIOVERSION  06/28/2015

## 2018-11-12 RX ORDER — LORAZEPAM 1 MG/1
1 TABLET ORAL 2 TIMES DAILY
Qty: 180 TABLET | Refills: 1 | Status: SHIPPED | OUTPATIENT
Start: 2018-11-12 | End: 2019-07-01 | Stop reason: SDUPTHER

## 2018-11-12 RX ORDER — LORAZEPAM 1 MG/1
1 TABLET ORAL 2 TIMES DAILY
Qty: 180 TABLET | Refills: 1 | Status: SHIPPED | OUTPATIENT
Start: 2018-11-12 | End: 2018-11-12 | Stop reason: SDUPTHER

## 2018-11-13 DIAGNOSIS — Z12.11 COLON CANCER SCREENING: Primary | ICD-10-CM

## 2019-02-14 ENCOUNTER — TELEPHONE (OUTPATIENT)
Dept: FAMILY MEDICINE CLINIC | Facility: CLINIC | Age: 64
End: 2019-02-14

## 2019-02-14 DIAGNOSIS — I48.0 PAF (PAROXYSMAL ATRIAL FIBRILLATION) (HCC): ICD-10-CM

## 2019-02-14 DIAGNOSIS — R73.01 IMPAIRED FASTING BLOOD SUGAR: ICD-10-CM

## 2019-02-14 DIAGNOSIS — E07.9 THYROID DYSFUNCTION: ICD-10-CM

## 2019-02-14 DIAGNOSIS — R35.1 NOCTURIA: ICD-10-CM

## 2019-02-14 DIAGNOSIS — I10 ESSENTIAL HYPERTENSION: Primary | ICD-10-CM

## 2019-02-14 DIAGNOSIS — Z51.81 ENCOUNTER FOR MONITORING FLECAINIDE THERAPY: ICD-10-CM

## 2019-02-14 DIAGNOSIS — Z79.899 ENCOUNTER FOR MONITORING FLECAINIDE THERAPY: ICD-10-CM

## 2019-02-14 DIAGNOSIS — E78.2 MIXED HYPERLIPIDEMIA: ICD-10-CM

## 2019-02-14 DIAGNOSIS — E55.9 VITAMIN D DEFICIENCY: ICD-10-CM

## 2019-02-14 NOTE — TELEPHONE ENCOUNTER
Patient is requesting for Labs to be done   CBC and diff  PBNP  CMP  MG  Lipid Proflie   PSA  A1C  Flecanide level   Vit D hydroxy  TSH/T4

## 2019-02-23 ENCOUNTER — APPOINTMENT (OUTPATIENT)
Dept: LAB | Facility: HOSPITAL | Age: 64
End: 2019-02-23
Payer: COMMERCIAL

## 2019-02-23 ENCOUNTER — TRANSCRIBE ORDERS (OUTPATIENT)
Dept: ADMINISTRATIVE | Facility: HOSPITAL | Age: 64
End: 2019-02-23

## 2019-02-23 DIAGNOSIS — R35.1 NOCTURIA: ICD-10-CM

## 2019-02-23 DIAGNOSIS — R73.01 IMPAIRED FASTING BLOOD SUGAR: ICD-10-CM

## 2019-02-23 DIAGNOSIS — Z51.81 ENCOUNTER FOR MONITORING FLECAINIDE THERAPY: ICD-10-CM

## 2019-02-23 DIAGNOSIS — Z79.899 ENCOUNTER FOR MONITORING FLECAINIDE THERAPY: ICD-10-CM

## 2019-02-23 DIAGNOSIS — I48.0 PAF (PAROXYSMAL ATRIAL FIBRILLATION) (HCC): ICD-10-CM

## 2019-02-23 DIAGNOSIS — I10 ESSENTIAL HYPERTENSION: ICD-10-CM

## 2019-02-23 DIAGNOSIS — E07.9 THYROID DYSFUNCTION: ICD-10-CM

## 2019-02-23 DIAGNOSIS — E55.9 VITAMIN D DEFICIENCY: ICD-10-CM

## 2019-02-23 DIAGNOSIS — E78.2 MIXED HYPERLIPIDEMIA: ICD-10-CM

## 2019-02-23 LAB
25(OH)D3 SERPL-MCNC: 54.8 NG/ML (ref 30–100)
ALBUMIN SERPL BCP-MCNC: 4.4 G/DL (ref 3–5.2)
ALP SERPL-CCNC: 67 U/L (ref 43–122)
ALT SERPL W P-5'-P-CCNC: 13 U/L (ref 9–52)
ANION GAP SERPL CALCULATED.3IONS-SCNC: 7 MMOL/L (ref 5–14)
AST SERPL W P-5'-P-CCNC: 16 U/L (ref 17–59)
BASOPHILS # BLD AUTO: 0.1 THOUSANDS/ΜL (ref 0–0.1)
BASOPHILS NFR BLD AUTO: 1 % (ref 0–1)
BILIRUB SERPL-MCNC: 0.9 MG/DL
BUN SERPL-MCNC: 15 MG/DL (ref 5–25)
CALCIUM SERPL-MCNC: 9.7 MG/DL (ref 8.4–10.2)
CHLORIDE SERPL-SCNC: 103 MMOL/L (ref 97–108)
CHOLEST SERPL-MCNC: 105 MG/DL
CO2 SERPL-SCNC: 30 MMOL/L (ref 22–30)
CREAT SERPL-MCNC: 0.68 MG/DL (ref 0.7–1.5)
EOSINOPHIL # BLD AUTO: 0.2 THOUSAND/ΜL (ref 0–0.4)
EOSINOPHIL NFR BLD AUTO: 3 % (ref 0–6)
ERYTHROCYTE [DISTWIDTH] IN BLOOD BY AUTOMATED COUNT: 13.3 %
EST. AVERAGE GLUCOSE BLD GHB EST-MCNC: 128 MG/DL
GFR SERPL CREATININE-BSD FRML MDRD: 102 ML/MIN/1.73SQ M
GLUCOSE P FAST SERPL-MCNC: 96 MG/DL (ref 70–99)
HBA1C MFR BLD: 6.1 % (ref 4.2–6.3)
HCT VFR BLD AUTO: 45.9 % (ref 41–53)
HDLC SERPL-MCNC: 23 MG/DL (ref 40–59)
HGB BLD-MCNC: 15.8 G/DL (ref 13.5–17.5)
LDLC SERPL CALC-MCNC: 69 MG/DL
LYMPHOCYTES # BLD AUTO: 2 THOUSANDS/ΜL (ref 0.5–4)
LYMPHOCYTES NFR BLD AUTO: 22 % (ref 25–45)
MAGNESIUM SERPL-MCNC: 2.3 MG/DL (ref 1.6–2.3)
MCH RBC QN AUTO: 31.8 PG (ref 26–34)
MCHC RBC AUTO-ENTMCNC: 34.5 G/DL (ref 31–36)
MCV RBC AUTO: 92 FL (ref 80–100)
MONOCYTES # BLD AUTO: 0.6 THOUSAND/ΜL (ref 0.2–0.9)
MONOCYTES NFR BLD AUTO: 7 % (ref 1–10)
NEUTROPHILS # BLD AUTO: 6.2 THOUSANDS/ΜL (ref 1.8–7.8)
NEUTS SEG NFR BLD AUTO: 68 % (ref 45–65)
NONHDLC SERPL-MCNC: 82 MG/DL
NT-PROBNP SERPL-MCNC: 238 PG/ML (ref 0–299)
PLATELET # BLD AUTO: 320 THOUSANDS/UL (ref 150–450)
PMV BLD AUTO: 7.9 FL (ref 8.9–12.7)
POTASSIUM SERPL-SCNC: 4.4 MMOL/L (ref 3.6–5)
PROT SERPL-MCNC: 7.5 G/DL (ref 5.9–8.4)
PSA SERPL-MCNC: 2.1 NG/ML (ref 0–4)
RBC # BLD AUTO: 4.98 MILLION/UL (ref 4.5–5.9)
SODIUM SERPL-SCNC: 140 MMOL/L (ref 137–147)
T4 FREE SERPL-MCNC: 1.24 NG/DL (ref 0.76–1.46)
TRIGL SERPL-MCNC: 67 MG/DL
TSH SERPL DL<=0.05 MIU/L-ACNC: 0.94 UIU/ML (ref 0.47–4.68)
WBC # BLD AUTO: 9.1 THOUSAND/UL (ref 4.5–11)

## 2019-02-23 PROCEDURE — 84443 ASSAY THYROID STIM HORMONE: CPT

## 2019-02-23 PROCEDURE — 80053 COMPREHEN METABOLIC PANEL: CPT

## 2019-02-23 PROCEDURE — 36415 COLL VENOUS BLD VENIPUNCTURE: CPT

## 2019-02-23 PROCEDURE — 83735 ASSAY OF MAGNESIUM: CPT

## 2019-02-23 PROCEDURE — 80061 LIPID PANEL: CPT

## 2019-02-23 PROCEDURE — 82542 COL CHROMOTOGRAPHY QUAL/QUAN: CPT

## 2019-02-23 PROCEDURE — 84153 ASSAY OF PSA TOTAL: CPT

## 2019-02-23 PROCEDURE — 84439 ASSAY OF FREE THYROXINE: CPT

## 2019-02-23 PROCEDURE — 85025 COMPLETE CBC W/AUTO DIFF WBC: CPT

## 2019-02-23 PROCEDURE — 83036 HEMOGLOBIN GLYCOSYLATED A1C: CPT

## 2019-02-23 PROCEDURE — 82306 VITAMIN D 25 HYDROXY: CPT

## 2019-02-23 PROCEDURE — 83880 ASSAY OF NATRIURETIC PEPTIDE: CPT

## 2019-02-25 LAB — FLECAINIDE SERPL-MCNC: 0.12 UG/ML (ref 0.2–1)

## 2019-03-04 DIAGNOSIS — F51.01 PRIMARY INSOMNIA: Primary | ICD-10-CM

## 2019-03-04 RX ORDER — ZOLPIDEM TARTRATE 10 MG/1
10 TABLET ORAL
Qty: 100 TABLET | Refills: 0 | Status: SHIPPED | OUTPATIENT
Start: 2019-03-04 | End: 2019-07-22

## 2019-03-04 NOTE — TELEPHONE ENCOUNTER
Patient called and would like to speak to Dr Viviane Colón in regards to his medication, he has several questions [phone]

## 2019-03-11 DIAGNOSIS — Z12.11 SCREEN FOR COLON CANCER: Primary | ICD-10-CM

## 2019-03-19 ENCOUNTER — OFFICE VISIT (OUTPATIENT)
Dept: CARDIOLOGY CLINIC | Facility: CLINIC | Age: 64
End: 2019-03-19
Payer: COMMERCIAL

## 2019-03-19 VITALS
SYSTOLIC BLOOD PRESSURE: 122 MMHG | DIASTOLIC BLOOD PRESSURE: 78 MMHG | HEART RATE: 57 BPM | BODY MASS INDEX: 30.56 KG/M2 | HEIGHT: 73 IN | WEIGHT: 230.6 LBS

## 2019-03-19 DIAGNOSIS — I10 ESSENTIAL HYPERTENSION, BENIGN: ICD-10-CM

## 2019-03-19 DIAGNOSIS — E78.2 MIXED HYPERLIPIDEMIA: ICD-10-CM

## 2019-03-19 DIAGNOSIS — I48.91 ATRIAL FIBRILLATION, UNSPECIFIED TYPE (HCC): Primary | ICD-10-CM

## 2019-03-19 DIAGNOSIS — I48.0 PAROXYSMAL ATRIAL FIBRILLATION (HCC): ICD-10-CM

## 2019-03-19 PROCEDURE — 99214 OFFICE O/P EST MOD 30 MIN: CPT | Performed by: INTERNAL MEDICINE

## 2019-03-19 PROCEDURE — 93000 ELECTROCARDIOGRAM COMPLETE: CPT | Performed by: INTERNAL MEDICINE

## 2019-03-19 NOTE — PATIENT INSTRUCTIONS
CARDIOLOGY ASSESSMENT & PLAN:  Paroxysmal atrial fibrillation (HCC)  This condition has been stable with no recent occurrence of atrial fibrillation  EKG within normal limits  Recent flecainide is 0 12  Has had no recent palpitations  - Patient is advised to continue current medical therapy  - Dietary and medical compliance are reinforced  - Advised  to report any concerning symptoms such as chest pain, shortness of breath, decline in exercise tolerance or presyncope/syncope  Essential hypertension, benign  Blood pressure is well controlled current regimen amlodipine lisinopril  Echocardiogram last year significant for normal systolic and diastolic function no pulmonary hypertension  Will continue current medical therapy  Mixed hyperlipidemia  On moderate intensity statin therapy  Tolerating it  Recent LFTs  Will continue current medical therapy  We discussed about discontinuing aspirin as he is anticoagulation Eliquis  Since there is no history of coronary artery disease although there was a family history and as he is already on Eliquis, we can discontinue the use of aspirin 81 mg daily  I will leave the decision to him  DIAGNOSES:  1  Paroxysmal atrial fibrillation status post cardioversion in 2015   2  Mixed dyslipidemia  3  Benign essential hypertension  4  Chronic tobacco use  5  Suspected sleep apnea    Persantine nuclear stress test in July 2015 showed normal perfusion and normal left ventricular systolic function with mildly dilated left ventricular cavity, EF was 58%  Echocardiogram June 2018:  - Mild left ventricular cavity enlargement, normal left  ventricle systolic function and normal diastolic function  EF  approximately 50-55%  - Mild right atrial and mild to moderate left atrial  enlargement  - Aortic valve leaflet sclerosis  No stenosis or regurgitation   - Mitral valve leaflet sclerosis   Trace mitral valve  regurgitation   - Mild tricuspid and pulmonic valve regurgitation   - No pericardial effusion   - No pulmonary hypertension  Transesophageal echocardiogram in June 2015 showed mild-to-moderate left atrial enlargement, borderline reduced left ventricular systolic function  CURRENT ECG:  Results for orders placed or performed in visit on 03/19/19   POCT ECG    Narrative    Sinus bradycardia at 57 beats per minute, sinus arrhythmia, normal axis and intervals, QRS duration 96 milliseconds,  milliseconds, no significant ST T-wave abnormalities

## 2019-03-19 NOTE — ASSESSMENT & PLAN NOTE
On moderate intensity statin therapy  Tolerating it  Recent LFTs  Will continue current medical therapy  We discussed about discontinuing aspirin as he is anticoagulation Eliquis  Since there is no history of coronary artery disease although there was a family history and as he is already on Eliquis, we can discontinue the use of aspirin 81 mg daily  I will leave the decision to him  Strongly encouraged to quit smoking completely

## 2019-03-19 NOTE — ASSESSMENT & PLAN NOTE
Blood pressure is well controlled current regimen amlodipine lisinopril  Echocardiogram last year significant for normal systolic and diastolic function no pulmonary hypertension  Will continue current medical therapy

## 2019-03-19 NOTE — PROGRESS NOTES
[address]    [address], [address]  [address]  [address]  Phone#  [phone]  Fax#  [phone]  *-*-*-*-*-*-*-*-*-*-*-*-*-*-*-*-*-*-*-*-*-*-*-*-*-*-*-*-*-*-*-*-*-*-*-*-*-*-*-*-*-*-*-*-*-*-*-*-*-*-*-*-*-*    Marci Beverage DATE: 03/19/19 10:46 AM    PATIENT NAME: Gabby Bal   1955    [id number]  Age: 61 y o  Sex: male    AUTHOR: Carlita Dietz MD  PRIMARYCARE PHYSICIAN: Boyd Nguyen MD    DIAGNOSES:  1  Paroxysmal atrial fibrillation status post cardioversion in 2015   2  Mixed dyslipidemia  3  Benign essential hypertension  4  Chronic tobacco use  5  Suspected sleep apnea    Persantine nuclear stress test in July 2015 showed normal perfusion and normal left ventricular systolic function with mildly dilated left ventricular cavity, EF was 58%  Echocardiogram June 2018:  - Mild left ventricular cavity enlargement, normal left  ventricle systolic function and normal diastolic function  EF  approximately 50-55%  - Mild right atrial and mild to moderate left atrial  enlargement  - Aortic valve leaflet sclerosis  No stenosis or regurgitation   - Mitral valve leaflet sclerosis  Trace mitral valve  regurgitation   - Mild tricuspid and pulmonic valve regurgitation   - No pericardial effusion   - No pulmonary hypertension  Transesophageal echocardiogram in June 2015 showed mild-to-moderate left atrial enlargement, borderline reduced left ventricular systolic function  CURRENT ECG:  Results for orders placed or performed in visit on 03/19/19   POCT ECG    Narrative    Sinus bradycardia at 57 beats per minute, sinus arrhythmia, normal axis and intervals, QRS duration 96 milliseconds,  milliseconds, no significant ST T-wave abnormalities  CARDIOLOGY ASSESSMENT & PLAN:  Paroxysmal atrial fibrillation (HCC)  This condition has been stable with no recent occurrence of atrial fibrillation  EKG within normal limits  Recent flecainide is 0 12  Has had no recent palpitations  - Patient is advised to continue current medical therapy  - Dietary and medical compliance are reinforced  - Advised  to report any concerning symptoms such as chest pain, shortness of breath, decline in exercise tolerance or presyncope/syncope  Essential hypertension, benign  Blood pressure is well controlled current regimen amlodipine lisinopril  Echocardiogram last year significant for normal systolic and diastolic function no pulmonary hypertension  Will continue current medical therapy  Mixed hyperlipidemia  On moderate intensity statin therapy  Tolerating it  Recent LFTs  Will continue current medical therapy  We discussed about discontinuing aspirin as he is anticoagulation Eliquis  Since there is no history of coronary artery disease although there was a family history and as he is already on Eliquis, we can discontinue the use of aspirin 81 mg daily  I will leave the decision to him  Strongly encouraged to quit smoking completely  INTERVAL HISTORY & HISTORY OF PRESENT ILLNESS:  Lizabeth Schroeder is here for follow-up regarding his cardiac comorbidities which include:  paroxysmal atrial fibrillation status post most recent cardioversion in 2015, history of hypertension and dyslipidemia  He has he has been overall doing well with no recent subjective cardiac complaints  There have been no recent active symptoms of chest discomfort or exertional angina  There is no dyspnea with usual activities or exertion  No orthopnea, PND or pedal edema  No palpitations, lightheadedness, presyncope, or syncope  Denies any recent hospitalizations or other illnesses  Reports being compliant with medications  His last occurrence of atrial fibrillation was in 2017 January when he spontaneously converted to sinus in the emergency room  We had increased the dose of his flecainide    Mentions that he has further cut down on use of cigarettes to 7-8 cigarettes a day  REVIEW OF SYMPTOMS:    Positive for:   No significant symptoms  Negative for: All remaining as reviewed below and in HPI  SYSTEM SYMPTOMS REVIEWED:  General--weight change, fever, night sweats  Respirato  Cardiovascular--chest pain, syncope, dyspnea on exertion, edema, decline in exercise tolerance, claudication   Gastrointestinal--persistent vomiting, diarrhea, abdominal distention, blood in stool   Muscular or skeletal--joint pain or swelling   Neurologic--headaches, syncope, abnormal movement  Hematologic--history of easy bruising and bleeding   Endocrine--thyroid enlargement, heat or cold intolerance, polyuria   Psychiatric--anxiety, depression     *-*-*-*-*-*-*-*-*-*-*-*-*-*-*-*-*-*-*-*-*-*-*-*-*-*-*-*-*-*-*-*-*-*-*-*-*-*-*-*-*-*-*-*-*-*-*-*-*-*-*-*-*-*-  VITAL SIGNS:  Vitals:    03/19/19 1011   BP: 122/78   BP Location: Left arm   Patient Position: Sitting   Cuff Size: Large   Pulse: 57   Weight: 105 kg (230 lb 9 6 oz)   Height: 6' 1" (1 854 m)       *-*-*-*-*-*-*-*-*-*-*-*-*-*-*-*-*-*-*-*-*-*-*-*-*-*-*-*-*-*-*-*-*-*-*-*-*-*-*-*-*-*-*-*-*-*-*-*-*-*-*-*-*-*-  PHYSICAL EXAM:  General Appearance:    Alert, cooperative, no distress, appears stated age, tall normal built   Head, Eyes, ENT:    No obvious abnormality, moist mucous mebranes  Neck:   Supple, no carotid bruit or JVD   Back:     Symmetric, no curvature  Lungs:     Respirations unlabored  Clear to auscultation bilaterally,    Chest wall:    No tenderness or deformity   Heart:    Regular rate and rhythm, S1 and S2 normal, no murmur, rub  or gallop     Abdomen:     Soft, non-tender, No obvious masses, or organomegaly   Extremities:   Extremities normal, no cyanosis or edema    Skin:   Skin color, texture, turgor normal, no rashes or lesions     *-*-*-*-*-*-*-*-*-*-*-*-*-*-*-*-*-*-*-*-*-*-*-*-*-*-*-*-*-*-*-*-*-*-*-*-*-*-*-*-*-*-*-*-*-*-*-*-*-*-*-*-*-*-  CURRENT MEDICATION LIST:    Current Outpatient Medications:    amLODIPine (NORVASC) 5 mg tablet, TAKE 1 TABLET BY MOUTH TWICE DAILY  , Disp: 180 tablet, Rfl: 0    aspirin (ASPIR-81) 81 mg EC tablet, every 24 hours, Disp: , Rfl:     atorvastatin (LIPITOR) 40 mg tablet, , Disp: , Rfl:     Cholecalciferol (VITAMIN D3) 1000 units CAPS, take 1 tablet by oral route  every Monday,wednesday,friday, Disp: , Rfl:     Coenzyme Q10 (CO Q-10) 200 MG CAPS, Take one tablet by mouth Monday,wednesday,friday, Disp: , Rfl:     Cyanocobalamin ER (SV VITAMIN B-12 ER) 1000 MCG TBCR, 1 daily, Disp: , Rfl:     ELIQUIS 5 MG, Take 1 tablet (5 mg total) by mouth 2 (two) times a day, Disp: 60 tablet, Rfl: 11    flecainide (TAMBOCOR) 100 mg tablet, TAKE ONE TABLET BY MOUTH EVERY 12 HOURS, Disp: 180 tablet, Rfl: 0    lisinopril (ZESTRIL) 20 mg tablet, TAKE 1 TABLET BY MOUTH TWICE DAILY  , Disp: 180 tablet, Rfl: 0    LORazepam (ATIVAN) 1 mg tablet, Take 1 tablet (1 mg total) by mouth 2 (two) times a day (Patient taking differently: Take 1 mg by mouth 2 (two) times a day as needed ), Disp: 180 tablet, Rfl: 1    metoprolol tartrate (LOPRESSOR) 25 mg tablet, TAKE ONE TABLET BY MOUTH 2 TIMES A DAY, Disp: 180 tablet, Rfl: 0    Multiple Vitamins-Minerals (MULTIVITAMIN ADULT PO), take 1 by Oral route once, Disp: , Rfl:     selenium 200 mcg, take one tablet daily, Disp: , Rfl:     valACYclovir (VALTREX) 1,000 mg tablet, Take 1 tablet (1,000 mg total) by mouth 2 (two) times a day for 90 days (Patient taking differently: Take 1,000 mg by mouth 2 (two) times a day  ), Disp: 180 tablet, Rfl: 1    zolpidem (AMBIEN) 10 mg tablet, Take 1 tablet (10 mg total) by mouth daily at bedtime as needed for sleep (Patient not taking: Reported on 3/19/2019), Disp: 100 tablet, Rfl: 0    ALLERGIES:  Allergies   Allergen Reactions    No Active Allergies        *-*-*-*-*-*-*-*-*-*-*-*-*-*-*-*-*-*-*-*-*-*-*-*-*-*-*-*-*-*-*-*-*-*-*-*-*-*-*-*-*-*-*-*-*-*-*-*-*-*-*-*-*-*-    LABORATORY DATA:  I have personally reviewed pertinent labs     Potassium   Date Value Ref Range Status   02/23/2019 4 4 3 6 - 5 0 mmol/L Final   08/11/2018 4 5 3 6 - 5 0 mmol/L Final     Chloride   Date Value Ref Range Status   02/23/2019 103 97 - 108 mmol/L Final   08/11/2018 105 97 - 108 mmol/L Final     CO2   Date Value Ref Range Status   02/23/2019 30 22 - 30 mmol/L Final   08/11/2018 30 22 - 30 mmol/L Final     BUN   Date Value Ref Range Status   02/23/2019 15 5 - 25 mg/dL Final   08/11/2018 16 5 - 25 mg/dL Final     Creatinine   Date Value Ref Range Status   02/23/2019 0 68 (L) 0 70 - 1 50 mg/dL Final     Comment:     Standardized to IDMS reference method   08/11/2018 0 63 (L) 0 70 - 1 50 mg/dL Final     Comment:     Standardized to IDMS reference method     eGFR   Date Value Ref Range Status   02/23/2019 102 >60 ml/min/1 73sq m Final   08/11/2018 106 >60 ml/min/1 73sq m Final     Calcium   Date Value Ref Range Status   02/23/2019 9 7 8 4 - 10 2 mg/dL Final   08/11/2018 9 5 8 4 - 10 2 mg/dL Final     AST   Date Value Ref Range Status   02/23/2019 16 (L) 17 - 59 U/L Final     Comment:       Specimen collection should occur prior to Sulfasalazine administration due to the potential for falsely depressed results  08/11/2018 13 (L) 17 - 59 U/L Final     Comment:       Specimen collection should occur prior to Sulfasalazine administration due to the potential for falsely depressed results  ALT   Date Value Ref Range Status   02/23/2019 13 9 - 52 U/L Final     Comment:       Specimen collection should occur prior to Sulfasalazine administration due to the potential for falsely depressed results  08/11/2018 25 9 - 52 U/L Final     Comment:       Specimen collection should occur prior to Sulfasalazine administration due to the potential for falsely depressed results        Alkaline Phosphatase   Date Value Ref Range Status   02/23/2019 67 43 - 122 U/L Final   08/11/2018 73 43 - 122 U/L Final     Magnesium   Date Value Ref Range Status   02/23/2019 2 3 1 6 - 2 3 mg/dL Final   08/11/2018 2 3 1 6 - 2 3 mg/dL Final     WBC   Date Value Ref Range Status   02/23/2019 9 10 4 50 - 11 00 Thousand/uL Final   08/11/2018 9 70 4 50 - 11 00 Thousand/uL Final     Hemoglobin   Date Value Ref Range Status   02/23/2019 15 8 13 5 - 17 5 g/dL Final   08/11/2018 16 4 13 5 - 17 5 g/dL Final     Platelets   Date Value Ref Range Status   02/23/2019 320 150 - 450 Thousands/uL Final   08/11/2018 325 150 - 450 Thousands/uL Final     No results found for: PT, PTT, INR  No results found for: CKMB, BNP, DIGOXIN  No results found for: TSH  HDL, Direct   Date Value Ref Range Status   02/23/2019 23 (L) 40 - 59 mg/dL Final     Comment:       HDL Cholesterol:       High    >60 mg/dL       Low     <41 mg/dL  Specimen collection should occur prior to Metamizole administration due to the potential for falsley depressed results  Triglycerides   Date Value Ref Range Status   02/23/2019 67 <150 mg/dL Final     Comment:       Triglyceride:     Normal          <150 mg/dl     Borderline High 150-199 mg/dl     High            200-499 mg/dl        Very High       >499 mg/dl    Specimen collection should occur prior to N-Acetylcysteine or Metamizole administration due to the potential for falsely depressed results        Hemoglobin A1C   Date Value Ref Range Status   02/23/2019 6 1 4 2 - 6 3 % Final     No results found for: Merna Steiner, GRAMSTAIN, URINECX, WOUNDCULT, BODYFLUIDCUL, MRSACULTURE, INFLUAPCR, INFLUBPCR, RSVPCR, LEGIONELLAUR, CDIFFTOXINB    *-*-*-*-*-*-*-*-*-*-*-*-*-*-*-*-*-*-*-*-*-*-*-*-*-*-*-*-*-*-*-*-*-*-*-*-*-*-*-*-*-*-*-*-*-*-*-*-*-*-*-*-*-*-  PREVIOUS CARDIOLOGY & RADIOLOGY RESULTS:  Results for orders placed in visit on 06/19/18   Echo complete with contrast if indicated    Narrative [phone]  [place], [address] [phone]    Cardiovascular Report  _________________________________________________________________ Transthoracic Echocardiogram Report  Antonio Lowe    MRN:                 [id number]  Account :           [de-identified]  Accession :         [id number]  Exam Date:           2018 10:29  Patient Location:    O  Ordering Phys:       NANCY Torres)  Attending Phys:      NANCY Torres    (Can Rodriguez)  Technologist:        Nick Rodriguez    Age:  58             :   1955           Gender:   M  Wt:   236 lb         Ht:    73 in  Indication:  Atrial fibrillation,   Hypertension  BP:         /       HR:    Rhythm:              sinus  Technical Quality:   good      MEASUREMENTS  2D ECHO  Body Surface Area                 2 4 m²  LV Diastolic Diameter PLAX        6 2 cm  LV Systolic Diameter PLAX         4 2 cm  IVS Diastolic Thickness           1 2 cm  LVPW Diastolic Thickness          1 1 cm  LV Relative Wall Thickness        0 4  RV Internal Dim ED PLAX           4 4 cm  Aortic Root Diameter              3 2 cm  LA Systolic Diameter LX           4 8 cm  LA Area                           27 0 cm²  LV Ejection Fraction MOD 4C       60 4 %  RA Area 4C View                   25 0 cm²    DOPPLER  MV Area PHT                       3 5 cm²  Mitral E Point Velocity           90 0 cm/s  Mitral A Point Velocity           60 6 cm/s  Mitral E to A Ratio               1 5  MV E' Velocity                    7 3 cm/s  Mitral E to MV E' Ratio           12 4  TR Peak Velocity                  272 8 cm/s  TR Peak Gradient                  29 8 mmHg  TAPSE                             2 4 cm      FINDINGS  Left Ventricle  Mildly enlarged left ventricle cavity, mild concentric left  ventricular hypertrophy, low-normal left ventricular systolic  function and wall motion  Ejection fraction is estimated at  around 50-55%  Normal diastolic function  Right Ventricle  Normal right ventricle size and systolic function  Right Atrium  Mild right atrial cavity enlargement      Left Atrium  Mild approaching moderate left atrial cavity enlargement  Intact  interatrial septum  Mitral Valve  Mild anterior mitral valve leaflet sclerosis  Trace mitral valve  regurgitation  Aortic Valve  Trileaflet aortic valve with mild sclerosis  No aortic valve  stenosis or regurgitation  Tricuspid Valve  Mild tricuspid valve regurgitation  Pulmonic Valve  Trace to mild pulmonic valve regurgitation  Pericardium  No pericardial effusion  Aorta  Aortic root and proximal ascending aorta are normal in size on  2-D imaging  Additional Findings  Inferior vena cava is normal in size and demonstrates over 50%  collapse with respirations  CONCLUSIONS  - Mild left ventricular cavity enlargement, normal left  ventricle systolic function and normal diastolic function  EF  approximately 50-55%  - Mild right atrial and mild to moderate left atrial  enlargement  - Aortic valve leaflet sclerosis  No stenosis or regurgitation   - Mitral valve leaflet sclerosis  Trace mitral valve  regurgitation   - Mild tricuspid and pulmonic valve regurgitation   - No pericardial effusion   - No pulmonary hypertension  Compared to previous echocardiogram from June 2015 there is  overall no significant change  Ather J Luis  (Electronically Signed)  Final Date:      19 June 2018 18:16  CV Report                    CECILLE OWENS       [id number]  Final                                                     Page 3     No results found for this or any previous visit  No results found for this or any previous visit  No results found for this or any previous visit  Patient was never admitted  *-*-*-*-*-*-*-*-*-*-*-*-*-*-*-*-*-*-*-*-*-*-*-*-*-*-*-*-*-*-*-*-*-*-*-*-*-*-*-*-*-*-*-*-*-*-*-*-*-*-*-*-*-*-  SIGNATURES:   @[facility]@   Soo Rodriguez MD     CC:   Alex Edwards MD   No ref   provider found   *-*-*-*-*-*-*-*-*-*-*-*-*-*-*-*-*-*-*-*-*-*-*-*-*-*-*-*-*-*-*-*-*-*-*-*-*-*-*-*-*-*-*-*-*-*-*-*-*-*-*-*-*-*-    Social History     Socioeconomic History  Marital status: /Civil Union     Spouse name: Not on file    Number of children: Not on file    Years of education: Not on file    Highest education level: Not on file   Occupational History    Not on file   Social Needs    Financial resource strain: Not on file    Food insecurity:     Worry: Not on file     Inability: Not on file    Transportation needs:     Medical: Not on file     Non-medical: Not on file   Tobacco Use    Smoking status: Former Smoker     Packs/day: 20 00     Types: Cigarettes     Last attempt to quit: 2014     Years since quittin 2    Tobacco comment: 800 pk yrs as per [employer]   Substance and Sexual Activity    Alcohol use: Not on file    Drug use: Not on file    Sexual activity: Not on file   Lifestyle    Physical activity:     Days per week: Not on file     Minutes per session: Not on file    Stress: Not on file   Relationships    Social connections:     Talks on phone: Not on file     Gets together: Not on file     Attends [employer] service: Not on file     Active member of club or organization: Not on file     Attends meetings of clubs or organizations: Not on file     Relationship status: Not on file    Intimate partner violence:     Fear of current or ex partner: Not on file     Emotionally abused: Not on file     Physically abused: Not on file     Forced sexual activity: Not on file   Other Topics Concern    Not on file   Social History Narrative    Not on file      Family History   Problem Relation Age of Onset    Heart attack Father 61        MI    Heart attack Paternal Uncle         MI     Past Surgical History:   Procedure Laterality Date    CARDIOVERSION  2015

## 2019-03-19 NOTE — ASSESSMENT & PLAN NOTE
This condition has been stable with no recent occurrence of atrial fibrillation  EKG within normal limits  Recent flecainide is 0 12  Has had no recent palpitations  - Patient is advised to continue current medical therapy  - Dietary and medical compliance are reinforced  - Advised  to report any concerning symptoms such as chest pain, shortness of breath, decline in exercise tolerance or presyncope/syncope

## 2019-04-10 DIAGNOSIS — E78.2 MIXED HYPERLIPIDEMIA: Primary | ICD-10-CM

## 2019-04-10 RX ORDER — ATORVASTATIN CALCIUM 40 MG/1
TABLET, FILM COATED ORAL
Qty: 90 TABLET | Refills: 0 | Status: SHIPPED | OUTPATIENT
Start: 2019-04-10 | End: 2019-07-01 | Stop reason: SDUPTHER

## 2019-05-10 ENCOUNTER — TELEPHONE (OUTPATIENT)
Dept: CARDIOLOGY CLINIC | Facility: CLINIC | Age: 64
End: 2019-05-10

## 2019-07-01 DIAGNOSIS — F41.9 ANXIETY: ICD-10-CM

## 2019-07-01 DIAGNOSIS — E78.2 MIXED HYPERLIPIDEMIA: ICD-10-CM

## 2019-07-01 RX ORDER — LORAZEPAM 1 MG/1
1 TABLET ORAL 2 TIMES DAILY
Qty: 180 TABLET | Refills: 1 | Status: SHIPPED | OUTPATIENT
Start: 2019-07-01 | End: 2020-03-04 | Stop reason: SDUPTHER

## 2019-07-01 RX ORDER — ATORVASTATIN CALCIUM 40 MG/1
40 TABLET, FILM COATED ORAL DAILY
Qty: 90 TABLET | Refills: 1 | Status: SHIPPED | OUTPATIENT
Start: 2019-07-01 | End: 2019-12-12 | Stop reason: SDUPTHER

## 2019-07-01 NOTE — PROGRESS NOTES
Medication sent in per Pt request  Pt aware he needs an annual exam and will call back today to schedule

## 2019-07-16 DIAGNOSIS — I48.0 PAROXYSMAL ATRIAL FIBRILLATION (HCC): ICD-10-CM

## 2019-07-17 RX ORDER — APIXABAN 5 MG/1
TABLET, FILM COATED ORAL
Qty: 60 TABLET | Refills: 11 | Status: SHIPPED | OUTPATIENT
Start: 2019-07-17 | End: 2020-04-08 | Stop reason: SDUPTHER

## 2019-07-18 NOTE — PROGRESS NOTES
Assessment/Plan:    Stable    Follow  Up  With  Cardiology  And   With   Dr Ignacia Morales    Re:  Hx  Positive  cologuard         Patient seen in office today  During the visit I was accompanied by MA while physical exam was completed  No problem-specific Assessment & Plan notes found for this encounter  Problem List Items Addressed This Visit        Active Problems    Paroxysmal atrial fibrillation ([address] )    Mixed hyperlipidemia - Primary      Other Visit Diagnoses     Essential hypertension                Subjective:     BMI Counseling: There is no height or weight on file to calculate BMI  Discussed the patient's BMI with him  The BMI is above average  BMI counseling and education was provided to the patient  Nutrition recommendations include decreasing overall calorie intake  Patient ID: Bill Vazquez is a 61 y o  male  59-year-old male feels well   no acute symptoms   has remained in normal sinus rhythm and sees Dr Dietz   Cardiologist       Has seen the eye doctor dermatologist dentist on a routine basis    Has a history of a positive:  Guard and had a colonoscopy by Dr Ignacia Morales there is no evidence of tumor found he is scheduled for follow-up with Dr Ignacia Morales within 5 years I am recommending yearly fecal immuno test of the stool as well    Blood work ordered    Patient does not have any exercise intolerance      The following portions of the patient's history were reviewed and updated as appropriate: allergies, current medications, past family history, past medical history, past social history, past surgical history and problem list     Review of Systems   Constitutional: Negative for activity change, appetite change, chills, diaphoresis, fatigue and fever  HENT: Negative for congestion, sinus pressure, sinus pain and voice change  Eyes: Negative for visual disturbance  Respiratory: Negative for chest tightness, shortness of breath and wheezing      Cardiovascular: Negative for chest pain, palpitations and leg swelling  Gastrointestinal: Negative for abdominal distention and blood in stool  Genitourinary: Negative for difficulty urinating and dysuria  Musculoskeletal: Negative for arthralgias, back pain, gait problem, joint swelling, myalgias, neck pain and neck stiffness  Skin: Negative  Neurological: Negative for dizziness, tremors, seizures, syncope, facial asymmetry, speech difficulty, weakness, light-headedness, numbness and headaches  Psychiatric/Behavioral: Negative for agitation  Objective: There were no vitals taken for this visit  Physical Exam   Constitutional: He is oriented to person, place, and time  No distress  HENT:   Mouth/Throat: No oropharyngeal exudate  Eyes: Right eye exhibits no discharge  Left eye exhibits no discharge  No scleral icterus  Neck: No JVD present  No thyromegaly present  Cardiovascular: Normal rate, regular rhythm, normal heart sounds and intact distal pulses  No murmur heard  Pulmonary/Chest: Effort normal and breath sounds normal  No respiratory distress  Abdominal: Soft  Bowel sounds are normal  He exhibits no distension  There is no tenderness  There is no guarding  Musculoskeletal: He exhibits no deformity  Neurological: He is oriented to person, place, and time  Skin: Skin is warm and dry  He is not diaphoretic  Psychiatric: He has a normal mood and affect

## 2019-07-22 ENCOUNTER — OFFICE VISIT (OUTPATIENT)
Dept: FAMILY MEDICINE CLINIC | Facility: CLINIC | Age: 64
End: 2019-07-22
Payer: COMMERCIAL

## 2019-07-22 VITALS
BODY MASS INDEX: 29.16 KG/M2 | SYSTOLIC BLOOD PRESSURE: 130 MMHG | HEART RATE: 65 BPM | HEIGHT: 73 IN | WEIGHT: 220 LBS | DIASTOLIC BLOOD PRESSURE: 80 MMHG | RESPIRATION RATE: 17 BRPM | OXYGEN SATURATION: 98 % | TEMPERATURE: 97.9 F

## 2019-07-22 DIAGNOSIS — I48.0 PAROXYSMAL ATRIAL FIBRILLATION (HCC): ICD-10-CM

## 2019-07-22 DIAGNOSIS — E55.9 VITAMIN D DEFICIENCY: ICD-10-CM

## 2019-07-22 DIAGNOSIS — B00.9 HERPES INFECTION: ICD-10-CM

## 2019-07-22 DIAGNOSIS — E78.2 MIXED HYPERLIPIDEMIA: Primary | ICD-10-CM

## 2019-07-22 DIAGNOSIS — R53.83 FATIGUE, UNSPECIFIED TYPE: ICD-10-CM

## 2019-07-22 DIAGNOSIS — I10 ESSENTIAL HYPERTENSION: ICD-10-CM

## 2019-07-22 DIAGNOSIS — R73.01 IMPAIRED FASTING BLOOD SUGAR: ICD-10-CM

## 2019-07-22 DIAGNOSIS — Z12.11 SCREEN FOR COLON CANCER: ICD-10-CM

## 2019-07-22 DIAGNOSIS — R21 RASH: ICD-10-CM

## 2019-07-22 PROCEDURE — 1036F TOBACCO NON-USER: CPT | Performed by: SPECIALIST

## 2019-07-22 PROCEDURE — 3075F SYST BP GE 130 - 139MM HG: CPT | Performed by: SPECIALIST

## 2019-07-22 PROCEDURE — 3008F BODY MASS INDEX DOCD: CPT | Performed by: SPECIALIST

## 2019-07-22 PROCEDURE — 99214 OFFICE O/P EST MOD 30 MIN: CPT | Performed by: SPECIALIST

## 2019-07-22 PROCEDURE — 99396 PREV VISIT EST AGE 40-64: CPT | Performed by: SPECIALIST

## 2019-07-22 RX ORDER — ACYCLOVIR 50 MG/G
OINTMENT TOPICAL
Qty: 15 G | Refills: 3 | Status: SHIPPED | OUTPATIENT
Start: 2019-07-22 | End: 2020-11-17 | Stop reason: ALTCHOICE

## 2019-07-22 NOTE — PATIENT INSTRUCTIONS
Stop all tobacco products patient the presently on tapering doses nicotine with an inhaler    Continue to follow with Cardiology dentist eye doctor and Dermatology as needed    Follow-up for colonoscopy with Dr Lea Oneal    Do fecal immuno test of the stool yearly    Do fasting blood work 6 months after February tests

## 2019-07-22 NOTE — PROGRESS NOTES
ADULT ANNUAL PHYSICAL  [facility]    NAME: Roselyn Tobar  AGE: 61 y o  SEX: male  : 1955     DATE: 2019     Assessment and Plan:     Problem List Items Addressed This Visit        Active Problems    Paroxysmal atrial fibrillation ([address] )    Mixed hyperlipidemia - Primary      Other Visit Diagnoses     Essential hypertension              Health maintenance and preventative care screenings were discussed with patient today  Appropriate education was printed on patient's after visit summary  · Discussed risks/benefits of screening for   · Immunizations were reviewed in office today  Counseling:  Depression Screening Follow-up Plan: Patient's depression screening was positive with a PHQ-2 score of   Their PHQ-9 score was   Patient assessed for underlying major depression  They have no active suicidal ideations  Brief counseling provided and recommend additional follow-up/re-evaluation next office visit  Alcohol/drug use: discussed moderation in alcohol intake and avoidance of illicit drug use  Dental Health: discussed importance of regular tooth brushing, flossing, and dental visits  · Sexual health: discussed sexually transmitted diseases, partner selection, use of condoms, avoidance of unintended pregnancy, and contraceptive alternatives  No follow-ups on file  Chief Complaint:     Chief Complaint   Patient presents with    Physical Exam     Pt here for annual PE    Atrial Fibrillation     Pt follows Dr Jakob Dumas for this issue    Hypertension     Pts BP in office today is       History of Present Illness:     Adult Annual Physical   Patient here for a comprehensive physical exam  The patient reports no problems  Diet and Physical Activity  · Diet/Nutrition: well balanced diet  · Weight concerns: None at this time  · Exercise: walking        Depression Screening  PHQ-9 Depression Screening    PHQ-9:    Frequency of the following problems over the past two weeks:            General Health  · Sleep: sleeps well  · Hearing: normal - bilateral   · Vision: goes for regular eye exams  · Dental: regular dental visits          Health  · History of STDs?: no   · Erectile dysfunction: no      Review of Systems:     Review of Systems   Past Medical History:     Past Medical History:   Diagnosis Date    [facility] 2015    HLD (hyperlipidemia)     HTN (hypertension)       Past Surgical History:     Past Surgical History:   Procedure Laterality Date    CARDIOVERSION  2015      Social History:     Social History     Socioeconomic History    Marital status: /Civil Union     Spouse name: Not on file    Number of children: Not on file    Years of education: Not on file    Highest education level: Not on file   Occupational History    Not on file   Social Needs    Financial resource strain: Not on file    Food insecurity:     Worry: Not on file     Inability: Not on file    Transportation needs:     Medical: Not on file     Non-medical: Not on file   Tobacco Use    Smoking status: Former Smoker     Packs/day: 20 00     Types: Cigarettes     Last attempt to quit: 2014     Years since quittin 5    Tobacco comment: 800 pk yrs as per [employer]   Substance and Sexual Activity    Alcohol use: Not on file    Drug use: Not on file    Sexual activity: Not on file   Lifestyle    Physical activity:     Days per week: Not on file     Minutes per session: Not on file    Stress: Not on file   Relationships    Social connections:     Talks on phone: Not on file     Gets together: Not on file     Attends Advent service: Not on file     Active member of club or organization: Not on file     Attends meetings of clubs or organizations: Not on file     Relationship status: Not on file    Intimate partner violence:     Fear of current or ex partner: Not on file     Emotionally abused: Not on file Physically abused: Not on file     Forced sexual activity: Not on file   Other Topics Concern    Not on file   Social History Narrative    Not on file      Family History:     Family History   Problem Relation Age of Onset    Heart attack Father 61        MI    Heart attack Paternal Uncle         MI      Current Medications:     Current Outpatient Medications   Medication Sig Dispense Refill    amLODIPine (NORVASC) 5 mg tablet TAKE 1 TABLET BY MOUTH TWICE DAILY  180 tablet 0    aspirin (ASPIR-81) 81 mg EC tablet every 24 hours      atorvastatin (LIPITOR) 40 mg tablet Take 1 tablet (40 mg total) by mouth daily 90 tablet 1    Cholecalciferol (VITAMIN D3) 1000 units CAPS take 1 tablet by oral route  every Monday,wednesday,friday      Coenzyme Q10 (CO Q-10) 200 MG CAPS Take one tablet by mouth Monday,wednesday,friday      Cyanocobalamin ER (SV VITAMIN B-12 ER) 1000 MCG TBCR 1 daily      ELIQUIS 5 MG take 1 tablet by mouth twice a day 60 tablet 11    flecainide (TAMBOCOR) 100 mg tablet TAKE ONE TABLET BY MOUTH EVERY 12 HOURS 180 tablet 0    lisinopril (ZESTRIL) 20 mg tablet TAKE 1 TABLET BY MOUTH TWICE DAILY  180 tablet 0    LORazepam (ATIVAN) 1 mg tablet Take 1 tablet (1 mg total) by mouth 2 (two) times a day 180 tablet 1    metoprolol tartrate (LOPRESSOR) 25 mg tablet TAKE ONE TABLET BY MOUTH 2 TIMES A  tablet 0    Multiple Vitamins-Minerals (MULTIVITAMIN ADULT PO) take 1 by Oral route once      selenium 200 mcg take one tablet daily      valACYclovir (VALTREX) 1,000 mg tablet Take 1 tablet (1,000 mg total) by mouth 2 (two) times a day for 90 days (Patient taking differently: Take 1,000 mg by mouth 2 (two) times a day  ) 180 tablet 1    zolpidem (AMBIEN) 10 mg tablet Take 1 tablet (10 mg total) by mouth daily at bedtime as needed for sleep (Patient not taking: Reported on 3/19/2019) 100 tablet 0     No current facility-administered medications for this visit  Allergies:      Allergies Allergen Reactions    No Active Allergies       Objective: There were no vitals taken for this visit      Physical Exam     Health Maintenance:     Health Maintenance   Topic Date Due    Hepatitis C Screening  1955    Depression Screening PHQ  1955    CRC Screening: Colonoscopy  1955    INFLUENZA VACCINE  09/18/2019 (Originally 7/1/2019)    DTaP,Tdap,and Td Vaccines (1 - Tdap) 07/18/2020 (Originally 10/4/1976)    BMI: Adult  03/19/2020    BMI: Followup Plan  07/18/2020    Pneumococcal Vaccine: 65+ Years (1 of 2 - PCV13) 10/04/2020    Pneumococcal Vaccine: Pediatrics (0 to 5 Years) and At-Risk Patients (6 to 59 Years)  Aged Out    HEPATITIS B VACCINES  Aged [employer] History   Administered Date(s) Administered    Fluzone Split Quad 0 5 mL 11/10/2017    INFLUENZA 11/10/2017, 11/04/2018    Influenza, injectable, quadrivalent, preservative free 0 5 mL 11/04/2018       Ameena Pink MA  [address]

## 2019-07-25 ENCOUNTER — APPOINTMENT (OUTPATIENT)
Dept: LAB | Facility: CLINIC | Age: 64
End: 2019-07-25
Payer: COMMERCIAL

## 2019-07-25 DIAGNOSIS — Z12.11 SCREEN FOR COLON CANCER: ICD-10-CM

## 2019-07-25 LAB — HEMOCCULT STL QL IA: NEGATIVE

## 2019-07-25 PROCEDURE — G0328 FECAL BLOOD SCRN IMMUNOASSAY: HCPCS

## 2019-08-27 ENCOUNTER — TELEPHONE (OUTPATIENT)
Dept: FAMILY MEDICINE CLINIC | Facility: CLINIC | Age: 64
End: 2019-08-27

## 2019-08-27 DIAGNOSIS — I48.0 PAROXYSMAL ATRIAL FIBRILLATION (HCC): Primary | ICD-10-CM

## 2019-08-28 ENCOUNTER — APPOINTMENT (OUTPATIENT)
Dept: LAB | Facility: CLINIC | Age: 64
End: 2019-08-28
Payer: COMMERCIAL

## 2019-08-28 DIAGNOSIS — I10 ESSENTIAL HYPERTENSION: ICD-10-CM

## 2019-08-28 DIAGNOSIS — R21 RASH: ICD-10-CM

## 2019-08-28 DIAGNOSIS — R53.83 FATIGUE, UNSPECIFIED TYPE: ICD-10-CM

## 2019-08-28 DIAGNOSIS — I48.0 PAROXYSMAL ATRIAL FIBRILLATION (HCC): ICD-10-CM

## 2019-08-28 DIAGNOSIS — I10 ESSENTIAL HYPERTENSION, BENIGN: ICD-10-CM

## 2019-08-28 DIAGNOSIS — Z12.11 SCREEN FOR COLON CANCER: ICD-10-CM

## 2019-08-28 DIAGNOSIS — E78.2 MIXED HYPERLIPIDEMIA: ICD-10-CM

## 2019-08-28 DIAGNOSIS — E55.9 VITAMIN D DEFICIENCY: ICD-10-CM

## 2019-08-28 DIAGNOSIS — B00.9 HERPES INFECTION: ICD-10-CM

## 2019-08-28 DIAGNOSIS — R73.01 IMPAIRED FASTING BLOOD SUGAR: ICD-10-CM

## 2019-08-28 LAB
25(OH)D3 SERPL-MCNC: 61.3 NG/ML (ref 30–100)
ALBUMIN SERPL BCP-MCNC: 4.1 G/DL (ref 3.5–5)
ALP SERPL-CCNC: 85 U/L (ref 46–116)
ALT SERPL W P-5'-P-CCNC: 24 U/L (ref 12–78)
ANION GAP SERPL CALCULATED.3IONS-SCNC: 4 MMOL/L (ref 4–13)
AST SERPL W P-5'-P-CCNC: 7 U/L (ref 5–45)
BASOPHILS # BLD AUTO: 0.08 THOUSANDS/ΜL (ref 0–0.1)
BASOPHILS NFR BLD AUTO: 1 % (ref 0–1)
BILIRUB SERPL-MCNC: 0.7 MG/DL (ref 0.2–1)
BILIRUB UR QL STRIP: NEGATIVE
BUN SERPL-MCNC: 13 MG/DL (ref 5–25)
CALCIUM SERPL-MCNC: 9.2 MG/DL (ref 8.3–10.1)
CHLORIDE SERPL-SCNC: 106 MMOL/L (ref 100–108)
CHOLEST SERPL-MCNC: 104 MG/DL (ref 50–200)
CLARITY UR: NORMAL
CO2 SERPL-SCNC: 28 MMOL/L (ref 21–32)
COLOR UR: YELLOW
CREAT SERPL-MCNC: 0.78 MG/DL (ref 0.6–1.3)
CRP SERPL QL: <3 MG/L
EOSINOPHIL # BLD AUTO: 0.24 THOUSAND/ΜL (ref 0–0.61)
EOSINOPHIL NFR BLD AUTO: 2 % (ref 0–6)
ERYTHROCYTE [DISTWIDTH] IN BLOOD BY AUTOMATED COUNT: 13 % (ref 11.6–15.1)
EST. AVERAGE GLUCOSE BLD GHB EST-MCNC: 111 MG/DL
GFR SERPL CREATININE-BSD FRML MDRD: 96 ML/MIN/1.73SQ M
GLUCOSE P FAST SERPL-MCNC: 95 MG/DL (ref 65–99)
GLUCOSE UR STRIP-MCNC: NEGATIVE MG/DL
HBA1C MFR BLD: 5.5 % (ref 4.2–6.3)
HCT VFR BLD AUTO: 47.9 % (ref 36.5–49.3)
HDLC SERPL-MCNC: 32 MG/DL (ref 40–60)
HGB BLD-MCNC: 15.4 G/DL (ref 12–17)
HGB UR QL STRIP.AUTO: NEGATIVE
IMM GRANULOCYTES # BLD AUTO: 0.04 THOUSAND/UL (ref 0–0.2)
IMM GRANULOCYTES NFR BLD AUTO: 0 % (ref 0–2)
KETONES UR STRIP-MCNC: NEGATIVE MG/DL
LDLC SERPL CALC-MCNC: 62 MG/DL (ref 0–100)
LEUKOCYTE ESTERASE UR QL STRIP: NEGATIVE
LYMPHOCYTES # BLD AUTO: 2.2 THOUSANDS/ΜL (ref 0.6–4.47)
LYMPHOCYTES NFR BLD AUTO: 22 % (ref 14–44)
MAGNESIUM SERPL-MCNC: 2.5 MG/DL (ref 1.6–2.6)
MCH RBC QN AUTO: 30.1 PG (ref 26.8–34.3)
MCHC RBC AUTO-ENTMCNC: 32.2 G/DL (ref 31.4–37.4)
MCV RBC AUTO: 94 FL (ref 82–98)
MONOCYTES # BLD AUTO: 0.69 THOUSAND/ΜL (ref 0.17–1.22)
MONOCYTES NFR BLD AUTO: 7 % (ref 4–12)
NEUTROPHILS # BLD AUTO: 6.63 THOUSANDS/ΜL (ref 1.85–7.62)
NEUTS SEG NFR BLD AUTO: 68 % (ref 43–75)
NITRITE UR QL STRIP: NEGATIVE
NONHDLC SERPL-MCNC: 72 MG/DL
NRBC BLD AUTO-RTO: 0 /100 WBCS
NT-PROBNP SERPL-MCNC: 212 PG/ML
PH UR STRIP.AUTO: 7.5 [PH]
PLATELET # BLD AUTO: 316 THOUSANDS/UL (ref 149–390)
PMV BLD AUTO: 9.8 FL (ref 8.9–12.7)
POTASSIUM SERPL-SCNC: 4.1 MMOL/L (ref 3.5–5.3)
PROT SERPL-MCNC: 7.7 G/DL (ref 6.4–8.2)
PROT UR STRIP-MCNC: NEGATIVE MG/DL
PSA SERPL-MCNC: 2.5 NG/ML (ref 0–4)
RBC # BLD AUTO: 5.12 MILLION/UL (ref 3.88–5.62)
SODIUM SERPL-SCNC: 138 MMOL/L (ref 136–145)
SP GR UR STRIP.AUTO: 1.01 (ref 1–1.03)
TRIGL SERPL-MCNC: 50 MG/DL
TSH SERPL DL<=0.05 MIU/L-ACNC: 0.85 UIU/ML (ref 0.36–3.74)
UROBILINOGEN UR QL STRIP.AUTO: 1 E.U./DL
WBC # BLD AUTO: 9.88 THOUSAND/UL (ref 4.31–10.16)

## 2019-08-28 PROCEDURE — 86140 C-REACTIVE PROTEIN: CPT

## 2019-08-28 PROCEDURE — 85025 COMPLETE CBC W/AUTO DIFF WBC: CPT

## 2019-08-28 PROCEDURE — 83036 HEMOGLOBIN GLYCOSYLATED A1C: CPT

## 2019-08-28 PROCEDURE — 82542 COL CHROMOTOGRAPHY QUAL/QUAN: CPT

## 2019-08-28 PROCEDURE — 36415 COLL VENOUS BLD VENIPUNCTURE: CPT

## 2019-08-28 PROCEDURE — 84153 ASSAY OF PSA TOTAL: CPT

## 2019-08-28 PROCEDURE — 81003 URINALYSIS AUTO W/O SCOPE: CPT | Performed by: SPECIALIST

## 2019-08-28 PROCEDURE — 83735 ASSAY OF MAGNESIUM: CPT

## 2019-08-28 PROCEDURE — 82306 VITAMIN D 25 HYDROXY: CPT

## 2019-08-28 PROCEDURE — 80061 LIPID PANEL: CPT

## 2019-08-28 PROCEDURE — 80053 COMPREHEN METABOLIC PANEL: CPT

## 2019-08-28 PROCEDURE — 84443 ASSAY THYROID STIM HORMONE: CPT

## 2019-08-28 PROCEDURE — 83880 ASSAY OF NATRIURETIC PEPTIDE: CPT

## 2019-08-30 ENCOUNTER — TELEPHONE (OUTPATIENT)
Dept: FAMILY MEDICINE CLINIC | Facility: CLINIC | Age: 64
End: 2019-08-30

## 2019-08-30 NOTE — TELEPHONE ENCOUNTER
Patient called back asking for results  I gave him numbers but did not indicate what those number mean

## 2019-08-31 LAB — FLECAINIDE SERPL-MCNC: 0.18 UG/ML (ref 0.2–1)

## 2019-09-23 DIAGNOSIS — I10 ESSENTIAL HYPERTENSION, BENIGN: ICD-10-CM

## 2019-09-23 DIAGNOSIS — I48.0 PAROXYSMAL ATRIAL FIBRILLATION (HCC): ICD-10-CM

## 2019-09-23 RX ORDER — FLECAINIDE ACETATE 100 MG/1
TABLET ORAL
Qty: 180 TABLET | Refills: 4 | Status: SHIPPED | OUTPATIENT
Start: 2019-09-23 | End: 2020-10-02 | Stop reason: SDUPTHER

## 2019-09-23 RX ORDER — AMLODIPINE BESYLATE 5 MG/1
TABLET ORAL
Qty: 180 TABLET | Refills: 4 | Status: SHIPPED | OUTPATIENT
Start: 2019-09-23 | End: 2021-01-07 | Stop reason: SDUPTHER

## 2019-09-23 RX ORDER — LISINOPRIL 20 MG/1
TABLET ORAL
Qty: 180 TABLET | Refills: 4 | Status: SHIPPED | OUTPATIENT
Start: 2019-09-23 | End: 2020-11-19 | Stop reason: SDUPTHER

## 2019-11-08 ENCOUNTER — OFFICE VISIT (OUTPATIENT)
Dept: CARDIOLOGY CLINIC | Facility: CLINIC | Age: 64
End: 2019-11-08
Payer: COMMERCIAL

## 2019-11-08 VITALS
DIASTOLIC BLOOD PRESSURE: 70 MMHG | BODY MASS INDEX: 29.82 KG/M2 | SYSTOLIC BLOOD PRESSURE: 118 MMHG | HEART RATE: 67 BPM | HEIGHT: 73 IN | WEIGHT: 225 LBS

## 2019-11-08 DIAGNOSIS — I10 ESSENTIAL HYPERTENSION, BENIGN: ICD-10-CM

## 2019-11-08 DIAGNOSIS — E78.2 MIXED HYPERLIPIDEMIA: ICD-10-CM

## 2019-11-08 DIAGNOSIS — I48.0 PAROXYSMAL ATRIAL FIBRILLATION (HCC): Primary | ICD-10-CM

## 2019-11-08 PROCEDURE — 93000 ELECTROCARDIOGRAM COMPLETE: CPT | Performed by: INTERNAL MEDICINE

## 2019-11-08 PROCEDURE — 99214 OFFICE O/P EST MOD 30 MIN: CPT | Performed by: INTERNAL MEDICINE

## 2019-11-08 NOTE — ASSESSMENT & PLAN NOTE
Continues to maintain sinus rhythm  He is on antiarrhythmic therapy with flecainide and his ECG is within normal limits  Renal function is normal   He is on chronic anticoagulation     - Patient is advised to continue current medical therapy  - Dietary and medical compliance are reinforced  - Advised  to report any concerning symptoms such as chest pain, shortness of breath, decline in exercise tolerance or presyncope/syncope

## 2019-11-08 NOTE — ASSESSMENT & PLAN NOTE
Blood pressure is well controlled on current regimen with lisinopril  We will continue current therapy

## 2019-11-08 NOTE — PROGRESS NOTES
[facility]  [id number] [facility], [address] [place] [address]  Phone#  [phone]  Fax#  [phone]  *-*-*-*-*-*-*-*-*-*-*-*-*-*-*-*-*-*-*-*-*-*-*-*-*-*-*-*-*-*-*-*-*-*-*-*-*-*-*-*-*-*-*-*-*-*-*-*-*-*-*-*-*-*  Arin Don DATE: 11/08/19 9:22 AM  PATIENT NAME: Jose Gee   1955    [id number]  Age: 59 y o  Sex: male  AUTHOR: Carlita Dietz MD  PRIMARYCARE PHYSICIAN: Judith Mackey MD    DIAGNOSES:  1  Paroxysmal atrial fibrillation status post cardioversion in 2015   2  Mixed dyslipidemia  3  Benign essential hypertension  4  Chronic tobacco use  5  Suspected sleep apnea    Persantine nuclear stress test in July 2015 showed normal perfusion and normal left ventricular systolic function with mildly dilated left ventricular cavity, EF was 58%  Echocardiogram June 2018:  - Mild left ventricular cavity enlargement, normal left  ventricle systolic function and normal diastolic function  EF  approximately 50-55%  - Mild right atrial and mild to moderate left atrial  enlargement  - Aortic valve leaflet sclerosis  No stenosis or regurgitation   - Mitral valve leaflet sclerosis  Trace mitral valve  regurgitation   - Mild tricuspid and pulmonic valve regurgitation   - No pericardial effusion   - No pulmonary hypertension  Transesophageal echocardiogram in June 2015 showed mild-to-moderate left atrial enlargement, borderline reduced left ventricular systolic function  CURRENT ECG:  Results for orders placed or performed in visit on 11/08/19   POCT ECG    Narrative    Sinus rhythm, HR 67 with cm, normal axis and intervals, no significant ST T-wave abnormalities  CARDIOLOGY ASSESSMENT & PLAN:  1  Paroxysmal atrial fibrillation (HCC)  POCT ECG   2  Essential hypertension, benign     3  Mixed hyperlipidemia       Paroxysmal atrial fibrillation (HCC)  Continues to maintain sinus rhythm  He is on antiarrhythmic therapy with flecainide and his ECG is within normal limits    Renal function is normal   He is on chronic anticoagulation     - Patient is advised to continue current medical therapy  - Dietary and medical compliance are reinforced  - Advised  to report any concerning symptoms such as chest pain, shortness of breath, decline in exercise tolerance or presyncope/syncope  Essential hypertension, benign  Blood pressure is well controlled on current regimen with lisinopril  We will continue current therapy  Mixed hyperlipidemia  He is on atorvastatin 40 mg daily  Lipids are well controlled  Will continue current therapy  INTERVAL HISTORY & HISTORY OF PRESENT ILLNESS:  Holly Butcher is here for follow-up regarding his cardiac comorbidities which include:  Paroxysmal atrial fibrillation, dyslipidemia and hypertension  He has been overall doing well with no recent subjective symptoms  There have been no recent active symptoms of chest discomfort or exertional angina  There is no dyspnea with usual activities or exertion  No orthopnea, PND or pedal edema  No palpitations, lightheadedness, presyncope, or syncope  Denies any recent hospitalizations or other illnesses  Reports being compliant with medications  Has not had atrial fibrillation with for about 2 years now  REVIEW OF SYMPTOMS:    Positive for:  No significant symptoms  Negative for: All remaining as reviewed below and in HPI      SYSTEM SYMPTOMS REVIEWED:  General--weight change, fever, night sweats  Respiratory--cough, wheezing, shortness of breath, sputum production  Cardiovascular--chest pain, syncope, dyspnea on exertion, edema, decline in exercise tolerance, claudication   Gastrointestinal--persistent vomiting, diarrhea, abdominal distention, blood in stool   Muscular or skeletal--joint pain or swelling   Neurologic--headaches, syncope, abnormal movement  Hematologic--history of easy bruising and bleeding   Endocrine--thyroid enlargement, heat or cold intolerance, polyuria   Psychiatric--anxiety, depression     *-*-*-*-*-*-*-*-*-*-*-*-*-*-*-*-*-*-*-*-*-*-*-*-*-*-*-*-*-*-*-*-*-*-*-*-*-*-*-*-*-*-*-*-*-*-*-*-*-*-*-*-*-*-  VITAL SIGNS:  Vitals:    11/08/19 0849   BP: 118/70   BP Location: Left arm   Patient Position: Sitting   Cuff Size: Large   Pulse: 67   Weight: 102 kg (225 lb)   Height: 6' 1" (1 854 m)     Weight (last 2 days)     Date/Time   Weight    11/08/19 0849   102 (225)           ,   Wt Readings from Last 3 Encounters:   11/08/19 102 kg (225 lb)   07/22/19 99 8 kg (220 lb)   03/19/19 105 kg (230 lb 9 6 oz)    , Body mass index is 29 69 kg/m²  *-*-*-*-*-*-*-*-*-*-*-*-*-*-*-*-*-*-*-*-*-*-*-*-*-*-*-*-*-*-*-*-*-*-*-*-*-*-*-*-*-*-*-*-*-*-*-*-*-*-*-*-*-*-  PHYSICAL EXAM:  General Appearance:    Alert, cooperative, no distress, appears stated age   Head, Eyes, ENT:    No obvious abnormality, moist mucous mebranes  Neck:   Supple, no carotid bruit or JVD   Back:     Symmetric, no curvature  Lungs:     Respirations unlabored  Clear to auscultation bilaterally,    Chest wall:    No tenderness or deformity   Heart:    Regular rate and rhythm, S1 and S2 normal, no murmur, rub  or gallop     Abdomen:     Soft, non-tender, No obvious masses, or organomegaly   Extremities:   Extremities normal, no cyanosis or edema    Skin:   Skin color, texture, turgor normal, no rashes or lesions     *-*-*-*-*-*-*-*-*-*-*-*-*-*-*-*-*-*-*-*-*-*-*-*-*-*-*-*-*-*-*-*-*-*-*-*-*-*-*-*-*-*-*-*-*-*-*-*-*-*-*-*-*-*-  CURRENT MEDICATION LIST:    Current Outpatient Medications:     amLODIPine (NORVASC) 5 mg tablet, take 1 tablet by mouth twice a day, Disp: 180 tablet, Rfl: 4    aspirin (ASPIR-81) 81 mg EC tablet, every 24 hours, Disp: , Rfl:     atorvastatin (LIPITOR) 40 mg tablet, Take 1 tablet (40 mg total) by mouth daily, Disp: 90 tablet, Rfl: 1    Cholecalciferol (VITAMIN D3) 1000 units CAPS, take 1 tablet by oral route  every Monday,wednesday,friday, Disp: , Rfl:     Coenzyme Q10 (CO Q-10) 200 MG CAPS, Take one tablet by mouth Monday,wednesday,friday, Disp: , Rfl:     Cyanocobalamin ER (SV VITAMIN B-12 ER) 1000 MCG TBCR, 1 daily, Disp: , Rfl:     ELIQUIS 5 MG, take 1 tablet by mouth twice a day, Disp: 60 tablet, Rfl: 11    flecainide (TAMBOCOR) 100 mg tablet, take 1 tablet by mouth every 12 hours, Disp: 180 tablet, Rfl: 4    lisinopril (ZESTRIL) 20 mg tablet, take 1 tablet by mouth twice a day, Disp: 180 tablet, Rfl: 4    LORazepam (ATIVAN) 1 mg tablet, Take 1 tablet (1 mg total) by mouth 2 (two) times a day, Disp: 180 tablet, Rfl: 1    metoprolol tartrate (LOPRESSOR) 25 mg tablet, TAKE ONE TABLET BY MOUTH 2 TIMES A DAY, Disp: 180 tablet, Rfl: 0    Multiple Vitamins-Minerals (MULTIVITAMIN ADULT PO), take 1 by Oral route once, Disp: , Rfl:     selenium 200 mcg, take one tablet daily, Disp: , Rfl:     acyclovir (ZOVIRAX) 5 % ointment, Apply topically 5 (five) times a day (Patient not taking: Reported on 11/8/2019), Disp: 15 g, Rfl: 3    fluocinonide (LIDEX) 0 05 % cream, Apply topically 2 (two) times a day (Patient not taking: Reported on 11/8/2019), Disp: 30 g, Rfl: 0    valACYclovir (VALTREX) 1,000 mg tablet, Take 1 tablet (1,000 mg total) by mouth 2 (two) times a day for 90 days (Patient taking differently: Take 1,000 mg by mouth 2 (two) times a day  ), Disp: 180 tablet, Rfl: 1    ALLERGIES:  Allergies   Allergen Reactions    No Active Allergies        *-*-*-*-*-*-*-*-*-*-*-*-*-*-*-*-*-*-*-*-*-*-*-*-*-*-*-*-*-*-*-*-*-*-*-*-*-*-*-*-*-*-*-*-*-*-*-*-*-*-*-*-*-*-  The LABORATORY DATA:  I have personally reviewed pertinent labs    Blood work from 28th of August indicates normal renal function and electrolytes, NT proBNP was 212, flecainide level was 0 18        Potassium   Date Value Ref Range Status   08/28/2019 4 1 3 5 - 5 3 mmol/L Final   02/23/2019 4 4 3 6 - 5 0 mmol/L Final   08/11/2018 4 5 3 6 - 5 0 mmol/L Final     Chloride   Date Value Ref Range Status   08/28/2019 106 100 - 108 mmol/L Final   02/23/2019 103 97 - 108 mmol/L Final   08/11/2018 105 97 - 108 mmol/L Final     CO2   Date Value Ref Range Status   08/28/2019 28 21 - 32 mmol/L Final   02/23/2019 30 22 - 30 mmol/L Final   08/11/2018 30 22 - 30 mmol/L Final     BUN   Date Value Ref Range Status   08/28/2019 13 5 - 25 mg/dL Final   02/23/2019 15 5 - 25 mg/dL Final   08/11/2018 16 5 - 25 mg/dL Final     Creatinine   Date Value Ref Range Status   08/28/2019 0 78 0 60 - 1 30 mg/dL Final     Comment:     Standardized to IDMS reference method   02/23/2019 0 68 (L) 0 70 - 1 50 mg/dL Final     Comment:     Standardized to IDMS reference method   08/11/2018 0 63 (L) 0 70 - 1 50 mg/dL Final     Comment:     Standardized to IDMS reference method     eGFR   Date Value Ref Range Status   08/28/2019 96 ml/min/1 73sq m Final   02/23/2019 102 >60 ml/min/1 73sq m Final   08/11/2018 106 >60 ml/min/1 73sq m Final     Calcium   Date Value Ref Range Status   08/28/2019 9 2 8 3 - 10 1 mg/dL Final   02/23/2019 9 7 8 4 - 10 2 mg/dL Final   08/11/2018 9 5 8 4 - 10 2 mg/dL Final     AST   Date Value Ref Range Status   08/28/2019 7 5 - 45 U/L Final     Comment:       Specimen collection should occur prior to Sulfasalazine administration due to the potential for falsely depressed results  02/23/2019 16 (L) 17 - 59 U/L Final     Comment:       Specimen collection should occur prior to Sulfasalazine administration due to the potential for falsely depressed results  08/11/2018 13 (L) 17 - 59 U/L Final     Comment:       Specimen collection should occur prior to Sulfasalazine administration due to the potential for falsely depressed results  ALT   Date Value Ref Range Status   08/28/2019 24 12 - 78 U/L Final     Comment:       Specimen collection should occur prior to Sulfasalazine and/or Sulfapyridine administration due to the potential for falsely depressed results      02/23/2019 13 9 - 52 U/L Final     Comment:       Specimen collection should occur prior to Sulfasalazine administration due to the potential for falsely depressed results  08/11/2018 25 9 - 52 U/L Final     Comment:       Specimen collection should occur prior to Sulfasalazine administration due to the potential for falsely depressed results  Alkaline Phosphatase   Date Value Ref Range Status   08/28/2019 85 46 - 116 U/L Final   02/23/2019 67 43 - 122 U/L Final   08/11/2018 73 43 - 122 U/L Final     Magnesium   Date Value Ref Range Status   08/28/2019 2 5 1 6 - 2 6 mg/dL Final   02/23/2019 2 3 1 6 - 2 3 mg/dL Final   08/11/2018 2 3 1 6 - 2 3 mg/dL Final     WBC   Date Value Ref Range Status   08/28/2019 9 88 4 31 - 10 16 Thousand/uL Final   02/23/2019 9 10 4 50 - 11 00 Thousand/uL Final   08/11/2018 9 70 4 50 - 11 00 Thousand/uL Final     Hemoglobin   Date Value Ref Range Status   08/28/2019 15 4 12 0 - 17 0 g/dL Final   02/23/2019 15 8 13 5 - 17 5 g/dL Final   08/11/2018 16 4 13 5 - 17 5 g/dL Final     Platelets   Date Value Ref Range Status   08/28/2019 316 149 - 390 Thousands/uL Final   02/23/2019 320 150 - 450 Thousands/uL Final   08/11/2018 325 150 - 450 Thousands/uL Final     No results found for: PT, PTT, INR  No results found for: CKMB, DIGOXIN  No results found for: TSH  HDL, Direct   Date Value Ref Range Status   08/28/2019 32 (L) 40 - 60 mg/dL Final     Comment:       HDL Cholesterol:       High    >60 mg/dL       Low     <41 mg/dL  Specimen collection should occur prior to Metamizole administration due to the potential for falsley depressed results  Triglycerides   Date Value Ref Range Status   08/28/2019 50 <=150 mg/dL Final     Comment:       Triglyceride:     Normal          <150 mg/dl     Borderline High 150-199 mg/dl     High            200-499 mg/dl        Very High       >499 mg/dl    Specimen collection should occur prior to N-Acetylcysteine or Metamizole administration due to the potential for falsely depressed results        Hemoglobin A1C   Date Value Ref Range Status   08/28/2019 5 5 4 2 - 6 3 % Final     No results found for: Pepe Caulk, GRAMSTAIN, URINECX, WOUNDCULT, BODYFLUIDCUL, MRSACULTURE, INFLUAPCR, INFLUBPCR, RSVPCR, LEGIONELLAUR, CDIFFTOXINB    *-*-*-*-*-*-*-*-*-*-*-*-*-*-*-*-*-*-*-*-*-*-*-*-*-*-*-*-*-*-*-*-*-*-*-*-*-*-*-*-*-*-*-*-*-*-*-*-*-*-*-*-*-*-  PREVIOUS CARDIOLOGY & RADIOLOGY RESULTS:  Results for orders placed in visit on 18   Echo complete with contrast if indicated    Narrative [id number] [facility], [id number] [phone]    Cardiovascular Report  _________________________________________________________________         Transthoracic Echocardiogram Report  Shanice Nelson    MRN:                 [id number]  Account :           [de-identified]  Accession :         [id number]  Exam Date:           2018 10:29  Patient Location:    O  Ordering Phys:       NANCY Van)  Attending Phys:      NANCY Van    (Kitty Morris)  Technologist:        Rober James    Age:  58             :   1955           Gender:   M  Wt:   236 lb         Ht:    73 in  Indication:  Atrial fibrillation,   Hypertension  BP:         /       HR:    Rhythm:              sinus  Technical Quality:   good      MEASUREMENTS  2D ECHO  Body Surface Area                 2 4 m²  LV Diastolic Diameter PLAX        6 2 cm  LV Systolic Diameter PLAX         4 2 cm  IVS Diastolic Thickness           1 2 cm  LVPW Diastolic Thickness          1 1 cm  LV Relative Wall Thickness        0 4  RV Internal Dim ED PLAX           4 4 cm  Aortic Root Diameter              3 2 cm  LA Systolic Diameter LX           4 8 cm  LA Area                           27 0 cm²  LV Ejection Fraction MOD 4C       60 4 %  RA Area 4C View                   25 0 cm²    DOPPLER  MV Area PHT                       3 5 cm²  Mitral E Point Velocity           90 0 cm/s  Mitral A Point Velocity           60 6 cm/s  Mitral E to A Ratio               1 5  MV E' Velocity 7 3 cm/s  Mitral E to MV E' Ratio           12 4  TR Peak Velocity                  272 8 cm/s  TR Peak Gradient                  29 8 mmHg  TAPSE                             2 4 cm      FINDINGS  Left Ventricle  Mildly enlarged left ventricle cavity, mild concentric left  ventricular hypertrophy, low-normal left ventricular systolic  function and wall motion  Ejection fraction is estimated at  around 50-55%  Normal diastolic function  Right Ventricle  Normal right ventricle size and systolic function  Right Atrium  Mild right atrial cavity enlargement  Left Atrium  Mild approaching moderate left atrial cavity enlargement  Intact  interatrial septum  Mitral Valve  Mild anterior mitral valve leaflet sclerosis  Trace mitral valve  regurgitation  Aortic Valve  Trileaflet aortic valve with mild sclerosis  No aortic valve  stenosis or regurgitation  Tricuspid Valve  Mild tricuspid valve regurgitation  Pulmonic Valve  Trace to mild pulmonic valve regurgitation  Pericardium  No pericardial effusion  Aorta  Aortic root and proximal ascending aorta are normal in size on  2-D imaging  Additional Findings  Inferior vena cava is normal in size and demonstrates over 50%  collapse with respirations  CONCLUSIONS  - Mild left ventricular cavity enlargement, normal left  ventricle systolic function and normal diastolic function  EF  approximately 50-55%  - Mild right atrial and mild to moderate left atrial  enlargement  - Aortic valve leaflet sclerosis  No stenosis or regurgitation   - Mitral valve leaflet sclerosis  Trace mitral valve  regurgitation   - Mild tricuspid and pulmonic valve regurgitation   - No pericardial effusion   - No pulmonary hypertension  Compared to previous echocardiogram from June 2015 there is  overall no significant change      Ather J Luis  (Electronically Signed)  Final Date:      19 June 2018 18:16  CV Report                    CECILLE OWENS [id number]  Final                                                     Page 3     No results found for this or any previous visit  No results found for this or any previous visit  No results found for this or any previous visit    XR shoulder 2+ vw right  2 16 840 1 [id number] 1 11 [id number]  XR chest portable  2 03 718 1 135441 5 55 [id number]  NM cardiac blood pool muga (at rest)  2 16 840 1 832924 1 11 [id number]   abdominal aorta  2 16 840 1 149927 1 11 [id number]        *-*-*-*-*-*-*-*-*-*-*-*-*-*-*-*-*-*-*-*-*-*-*-*-*-*-*-*-*-*-*-*-*-*-*-*-*-*-*-*-*-*-*-*-*-*-*-*-*-*-*-*-*-*-  SIGNATURES:   [unfilled]   Carlita Dietz MD     *-*-*-*-*-*-*-*-*-*-*-*-*-*-*-*-*-*-*-*-*-*-*-*-*-*-*-*-*-*-*-*-*-*-*-*-*-*-*-*-*-*-*-*-*-*-*-*-*-*-*-*-*-*-    Social History     Socioeconomic History    Marital status: /Civil Union     Spouse name: Not on file    Number of children: Not on file    Years of education: Not on file    Highest education level: Not on file   Occupational History    Occupation: retired   Social Needs    Financial resource strain: Not on file    Food insecurity:     Worry: Not on file     Inability: Not on file   [employer] needs:     Medical: Not on file     Non-medical: Not on file   Tobacco Use    Smoking status: Former Smoker     Packs/day: 20 00     Types: Cigarettes     Last attempt to quit: 2014     Years since quittin 8    Smokeless tobacco: Never Used    Tobacco comment: 800 pk yrs as per [employer]   Substance and Sexual Activity    Alcohol use: Not Currently    Drug use: Never    Sexual activity: Yes     Partners: Female     Birth control/protection: None   Lifestyle    Physical activity:     Days per week: Not on file     Minutes per session: Not on file    Stress: Not on file   Relationships    Social connections:     Talks on phone: Not on file     Gets together: Not on file     Attends [employer] service: Not on file     Active member of club or organization: Not on file     Attends meetings of clubs or organizations: Not on file     Relationship status: Not on file    Intimate partner violence:     Fear of current or ex partner: Not on file     Emotionally abused: Not on file     Physically abused: Not on file     Forced sexual activity: Not on file   Other Topics Concern    Not on file   Social History Narrative    Not on file      Family History   Problem Relation Age of Onset    Heart attack Father 61        MI    Heart attack Paternal Uncle         MI     Past Surgical History:   Procedure Laterality Date    CARDIOVERSION  06/28/2015

## 2019-11-08 NOTE — PATIENT INSTRUCTIONS
CARDIOLOGY ASSESSMENT & PLAN:  1  Paroxysmal atrial fibrillation (HCC)  POCT ECG   2  Essential hypertension, benign     3  Mixed hyperlipidemia       Paroxysmal atrial fibrillation (HCC)  Continues to maintain sinus rhythm  He is on antiarrhythmic therapy with flecainide and his ECG is within normal limits  Renal function is normal   He is on chronic anticoagulation     - Patient is advised to continue current medical therapy  - Dietary and medical compliance are reinforced  - Advised  to report any concerning symptoms such as chest pain, shortness of breath, decline in exercise tolerance or presyncope/syncope  Essential hypertension, benign  Blood pressure is well controlled on current regimen with lisinopril  We will continue current therapy  Mixed hyperlipidemia  He is on atorvastatin 40 mg daily  Lipids are well controlled  Will continue current therapy  DIAGNOSES:  1  Paroxysmal atrial fibrillation status post cardioversion in 2015   2  Mixed dyslipidemia  3  Benign essential hypertension  4  Chronic tobacco use  5  Suspected sleep apnea    Persantine nuclear stress test in July 2015 showed normal perfusion and normal left ventricular systolic function with mildly dilated left ventricular cavity, EF was 58%  Echocardiogram June 2018:  - Mild left ventricular cavity enlargement, normal left  ventricle systolic function and normal diastolic function  EF  approximately 50-55%  - Mild right atrial and mild to moderate left atrial  enlargement  - Aortic valve leaflet sclerosis  No stenosis or regurgitation   - Mitral valve leaflet sclerosis  Trace mitral valve  regurgitation   - Mild tricuspid and pulmonic valve regurgitation   - No pericardial effusion   - No pulmonary hypertension  Transesophageal echocardiogram in June 2015 showed mild-to-moderate left atrial enlargement, borderline reduced left ventricular systolic function

## 2019-11-25 DIAGNOSIS — I48.0 PAROXYSMAL ATRIAL FIBRILLATION (HCC): ICD-10-CM

## 2019-12-12 DIAGNOSIS — E78.2 MIXED HYPERLIPIDEMIA: ICD-10-CM

## 2019-12-13 RX ORDER — ATORVASTATIN CALCIUM 40 MG/1
TABLET, FILM COATED ORAL
Qty: 90 TABLET | Refills: 1 | Status: SHIPPED | OUTPATIENT
Start: 2019-12-13 | End: 2020-03-04 | Stop reason: SDUPTHER

## 2019-12-24 DIAGNOSIS — J01.00 ACUTE MAXILLARY SINUSITIS, RECURRENCE NOT SPECIFIED: Primary | ICD-10-CM

## 2019-12-24 RX ORDER — AMOXICILLIN AND CLAVULANATE POTASSIUM 875; 125 MG/1; MG/1
1 TABLET, FILM COATED ORAL EVERY 12 HOURS SCHEDULED
Qty: 20 TABLET | Refills: 0 | Status: SHIPPED | OUTPATIENT
Start: 2019-12-24 | End: 2020-01-03

## 2020-02-10 ENCOUNTER — TELEPHONE (OUTPATIENT)
Dept: FAMILY MEDICINE CLINIC | Facility: CLINIC | Age: 65
End: 2020-02-10

## 2020-02-10 NOTE — TELEPHONE ENCOUNTER
Ok  They  Are  Going  Through  The  Gum  Hold   eliquis   48   Hours  And  Do  The procedure   Am  Day  3    Then  Resume  When  Bleeding  Is  Not  A  Problem

## 2020-02-10 NOTE — TELEPHONE ENCOUNTER
He is having a Apicoectomy- which is a root canal through the jaw bone- it starts at the end of the root and works it way up to top of root  There is bleeding associated with this type of root canal     Thanks!

## 2020-02-10 NOTE — TELEPHONE ENCOUNTER
Endodontics [phone]  Procedure on teeth  Does he need to hold blood thinners?   And if so low long before and how long after

## 2020-02-11 ENCOUNTER — TELEPHONE (OUTPATIENT)
Dept: CARDIOLOGY CLINIC | Facility: CLINIC | Age: 65
End: 2020-02-11

## 2020-02-11 NOTE — TELEPHONE ENCOUNTER
Phone call from patient  Would like to discuss dental procedure he is planning to have done  Apicoectomy-root canal through the jaw bone  He is not having any afib issues presently  He would like to discuss as not to disrupt the afib      Please call [phone]

## 2020-02-17 ENCOUNTER — TELEPHONE (OUTPATIENT)
Dept: FAMILY MEDICINE CLINIC | Facility: CLINIC | Age: 65
End: 2020-02-17

## 2020-02-17 NOTE — TELEPHONE ENCOUNTER
Pt called in requesting Dr Soto generate lab orders so he can have it done  Please advise thank you

## 2020-02-18 DIAGNOSIS — E07.9 THYROID DYSFUNCTION: ICD-10-CM

## 2020-02-18 DIAGNOSIS — E78.2 MIXED HYPERLIPIDEMIA: Primary | ICD-10-CM

## 2020-02-18 DIAGNOSIS — R53.83 FATIGUE, UNSPECIFIED TYPE: ICD-10-CM

## 2020-02-18 DIAGNOSIS — I48.0 PAROXYSMAL ATRIAL FIBRILLATION (HCC): ICD-10-CM

## 2020-02-18 DIAGNOSIS — R35.1 NOCTURIA: ICD-10-CM

## 2020-02-18 DIAGNOSIS — R73.01 IMPAIRED FASTING BLOOD SUGAR: ICD-10-CM

## 2020-02-19 DIAGNOSIS — R35.1 NOCTURIA: Primary | ICD-10-CM

## 2020-02-19 NOTE — PROGRESS NOTES
Pt called in requesting PSA be added to his lab work  Pt did notify his insurance company and they are going to cover it  This was approved by Dr Soto to add this lab

## 2020-02-20 ENCOUNTER — APPOINTMENT (OUTPATIENT)
Dept: LAB | Facility: CLINIC | Age: 65
End: 2020-02-20
Payer: COMMERCIAL

## 2020-02-20 DIAGNOSIS — E78.2 MIXED HYPERLIPIDEMIA: ICD-10-CM

## 2020-02-20 DIAGNOSIS — E07.9 THYROID DYSFUNCTION: ICD-10-CM

## 2020-02-20 DIAGNOSIS — R53.83 FATIGUE, UNSPECIFIED TYPE: ICD-10-CM

## 2020-02-20 DIAGNOSIS — R73.01 IMPAIRED FASTING BLOOD SUGAR: ICD-10-CM

## 2020-02-20 DIAGNOSIS — I48.0 PAROXYSMAL ATRIAL FIBRILLATION (HCC): ICD-10-CM

## 2020-02-20 DIAGNOSIS — R35.1 NOCTURIA: ICD-10-CM

## 2020-02-20 LAB
ALBUMIN SERPL BCP-MCNC: 4.2 G/DL (ref 3.5–5)
ALP SERPL-CCNC: 81 U/L (ref 46–116)
ALT SERPL W P-5'-P-CCNC: 27 U/L (ref 12–78)
ANION GAP SERPL CALCULATED.3IONS-SCNC: 4 MMOL/L (ref 4–13)
AST SERPL W P-5'-P-CCNC: 11 U/L (ref 5–45)
BASOPHILS # BLD AUTO: 0.09 THOUSANDS/ΜL (ref 0–0.1)
BASOPHILS NFR BLD AUTO: 1 % (ref 0–1)
BILIRUB SERPL-MCNC: 0.74 MG/DL (ref 0.2–1)
BILIRUB UR QL STRIP: NEGATIVE
BUN SERPL-MCNC: 16 MG/DL (ref 5–25)
CALCIUM SERPL-MCNC: 9.5 MG/DL (ref 8.3–10.1)
CHLORIDE SERPL-SCNC: 107 MMOL/L (ref 100–108)
CHOLEST SERPL-MCNC: 111 MG/DL (ref 50–200)
CLARITY UR: NORMAL
CO2 SERPL-SCNC: 28 MMOL/L (ref 21–32)
COLOR UR: YELLOW
CREAT SERPL-MCNC: 0.75 MG/DL (ref 0.6–1.3)
EOSINOPHIL # BLD AUTO: 0.3 THOUSAND/ΜL (ref 0–0.61)
EOSINOPHIL NFR BLD AUTO: 3 % (ref 0–6)
ERYTHROCYTE [DISTWIDTH] IN BLOOD BY AUTOMATED COUNT: 12.9 % (ref 11.6–15.1)
EST. AVERAGE GLUCOSE BLD GHB EST-MCNC: 117 MG/DL
GFR SERPL CREATININE-BSD FRML MDRD: 97 ML/MIN/1.73SQ M
GLUCOSE P FAST SERPL-MCNC: 101 MG/DL (ref 65–99)
GLUCOSE UR STRIP-MCNC: NEGATIVE MG/DL
HBA1C MFR BLD: 5.7 %
HCT VFR BLD AUTO: 47.6 % (ref 36.5–49.3)
HDLC SERPL-MCNC: 30 MG/DL
HGB BLD-MCNC: 15.5 G/DL (ref 12–17)
HGB UR QL STRIP.AUTO: NEGATIVE
IMM GRANULOCYTES # BLD AUTO: 0.04 THOUSAND/UL (ref 0–0.2)
IMM GRANULOCYTES NFR BLD AUTO: 0 % (ref 0–2)
KETONES UR STRIP-MCNC: NEGATIVE MG/DL
LDLC SERPL CALC-MCNC: 68 MG/DL (ref 0–100)
LEUKOCYTE ESTERASE UR QL STRIP: NEGATIVE
LYMPHOCYTES # BLD AUTO: 2.07 THOUSANDS/ΜL (ref 0.6–4.47)
LYMPHOCYTES NFR BLD AUTO: 22 % (ref 14–44)
MAGNESIUM SERPL-MCNC: 2.4 MG/DL (ref 1.6–2.6)
MCH RBC QN AUTO: 30.5 PG (ref 26.8–34.3)
MCHC RBC AUTO-ENTMCNC: 32.6 G/DL (ref 31.4–37.4)
MCV RBC AUTO: 94 FL (ref 82–98)
MONOCYTES # BLD AUTO: 0.65 THOUSAND/ΜL (ref 0.17–1.22)
MONOCYTES NFR BLD AUTO: 7 % (ref 4–12)
NEUTROPHILS # BLD AUTO: 6.19 THOUSANDS/ΜL (ref 1.85–7.62)
NEUTS SEG NFR BLD AUTO: 67 % (ref 43–75)
NITRITE UR QL STRIP: NEGATIVE
NONHDLC SERPL-MCNC: 81 MG/DL
NRBC BLD AUTO-RTO: 0 /100 WBCS
NT-PROBNP SERPL-MCNC: 220 PG/ML
PH UR STRIP.AUTO: 7.5 [PH]
PLATELET # BLD AUTO: 318 THOUSANDS/UL (ref 149–390)
PMV BLD AUTO: 9.6 FL (ref 8.9–12.7)
POTASSIUM SERPL-SCNC: 4.3 MMOL/L (ref 3.5–5.3)
PROT SERPL-MCNC: 7.7 G/DL (ref 6.4–8.2)
PROT UR STRIP-MCNC: NEGATIVE MG/DL
PSA SERPL-MCNC: 2.5 NG/ML (ref 0–4)
RBC # BLD AUTO: 5.09 MILLION/UL (ref 3.88–5.62)
SODIUM SERPL-SCNC: 139 MMOL/L (ref 136–145)
SP GR UR STRIP.AUTO: 1.02 (ref 1–1.03)
TRIGL SERPL-MCNC: 64 MG/DL
TSH SERPL DL<=0.05 MIU/L-ACNC: 1.03 UIU/ML (ref 0.36–3.74)
UROBILINOGEN UR QL STRIP.AUTO: 1 E.U./DL
WBC # BLD AUTO: 9.34 THOUSAND/UL (ref 4.31–10.16)

## 2020-02-20 PROCEDURE — 85025 COMPLETE CBC W/AUTO DIFF WBC: CPT

## 2020-02-20 PROCEDURE — 84443 ASSAY THYROID STIM HORMONE: CPT

## 2020-02-20 PROCEDURE — 80053 COMPREHEN METABOLIC PANEL: CPT

## 2020-02-20 PROCEDURE — 83036 HEMOGLOBIN GLYCOSYLATED A1C: CPT

## 2020-02-20 PROCEDURE — 36415 COLL VENOUS BLD VENIPUNCTURE: CPT

## 2020-02-20 PROCEDURE — 80299 QUANTITATIVE ASSAY DRUG: CPT

## 2020-02-20 PROCEDURE — 80061 LIPID PANEL: CPT

## 2020-02-20 PROCEDURE — 83880 ASSAY OF NATRIURETIC PEPTIDE: CPT

## 2020-02-20 PROCEDURE — 83735 ASSAY OF MAGNESIUM: CPT

## 2020-02-20 PROCEDURE — 84153 ASSAY OF PSA TOTAL: CPT

## 2020-02-20 PROCEDURE — 81003 URINALYSIS AUTO W/O SCOPE: CPT

## 2020-02-25 LAB — FLECAINIDE SERPL-MCNC: 0.16 UG/ML (ref 0.2–1)

## 2020-03-04 DIAGNOSIS — E78.2 MIXED HYPERLIPIDEMIA: ICD-10-CM

## 2020-03-04 DIAGNOSIS — F41.9 ANXIETY: ICD-10-CM

## 2020-03-04 RX ORDER — ATORVASTATIN CALCIUM 40 MG/1
40 TABLET, FILM COATED ORAL DAILY
Qty: 90 TABLET | Refills: 1 | Status: SHIPPED | OUTPATIENT
Start: 2020-03-04 | End: 2021-03-12 | Stop reason: SDUPTHER

## 2020-03-04 RX ORDER — LORAZEPAM 1 MG/1
1 TABLET ORAL 2 TIMES DAILY
Qty: 180 TABLET | Refills: 1 | Status: SHIPPED | OUTPATIENT
Start: 2020-03-04 | End: 2020-08-12 | Stop reason: DRUGHIGH

## 2020-04-08 DIAGNOSIS — I48.0 PAROXYSMAL ATRIAL FIBRILLATION (HCC): ICD-10-CM

## 2020-05-13 DIAGNOSIS — Z20.828 EXPOSURE TO SARS-ASSOCIATED CORONAVIRUS: Primary | ICD-10-CM

## 2020-05-14 LAB — EXTERNAL SARS COV 2 SEROLOGY (COVID19)AB (IGG),IA (QUEST): NEGATIVE

## 2020-06-01 ENCOUNTER — TELEPHONE (OUTPATIENT)
Dept: FAMILY MEDICINE CLINIC | Facility: CLINIC | Age: 65
End: 2020-06-01

## 2020-06-11 ENCOUNTER — APPOINTMENT (OUTPATIENT)
Dept: LAB | Facility: CLINIC | Age: 65
End: 2020-06-11
Payer: COMMERCIAL

## 2020-06-11 DIAGNOSIS — Z20.828 EXPOSURE TO SARS-ASSOCIATED CORONAVIRUS: ICD-10-CM

## 2020-06-11 PROCEDURE — 86769 SARS-COV-2 COVID-19 ANTIBODY: CPT

## 2020-06-12 LAB
SARS-COV-2 IGG SERPL QL IA: NEGATIVE
SARS-COV-2 IGM SERPL QL IA: NEGATIVE

## 2020-07-06 DIAGNOSIS — H66.90 OTITIS, UNSPECIFIED LATERALITY: Primary | ICD-10-CM

## 2020-07-06 RX ORDER — AMOXICILLIN AND CLAVULANATE POTASSIUM 875; 125 MG/1; MG/1
1 TABLET, FILM COATED ORAL EVERY 12 HOURS SCHEDULED
Qty: 20 TABLET | Refills: 1 | Status: SHIPPED | OUTPATIENT
Start: 2020-07-06 | End: 2020-07-16

## 2020-08-12 ENCOUNTER — OFFICE VISIT (OUTPATIENT)
Dept: FAMILY MEDICINE CLINIC | Facility: CLINIC | Age: 65
End: 2020-08-12
Payer: COMMERCIAL

## 2020-08-12 ENCOUNTER — TELEPHONE (OUTPATIENT)
Dept: FAMILY MEDICINE CLINIC | Facility: CLINIC | Age: 65
End: 2020-08-12

## 2020-08-12 VITALS
HEIGHT: 73 IN | BODY MASS INDEX: 28.57 KG/M2 | OXYGEN SATURATION: 96 % | TEMPERATURE: 98.4 F | RESPIRATION RATE: 18 BRPM | HEART RATE: 63 BPM | SYSTOLIC BLOOD PRESSURE: 110 MMHG | DIASTOLIC BLOOD PRESSURE: 70 MMHG | WEIGHT: 215.6 LBS

## 2020-08-12 DIAGNOSIS — I10 ESSENTIAL HYPERTENSION: ICD-10-CM

## 2020-08-12 DIAGNOSIS — B00.9 HERPES INFECTION: ICD-10-CM

## 2020-08-12 DIAGNOSIS — F41.9 ANXIETY: ICD-10-CM

## 2020-08-12 DIAGNOSIS — Z12.11 SCREENING FOR COLORECTAL CANCER: Primary | ICD-10-CM

## 2020-08-12 DIAGNOSIS — F51.01 PRIMARY INSOMNIA: ICD-10-CM

## 2020-08-12 DIAGNOSIS — I48.0 PAROXYSMAL ATRIAL FIBRILLATION (HCC): ICD-10-CM

## 2020-08-12 DIAGNOSIS — E07.9 THYROID DYSFUNCTION: ICD-10-CM

## 2020-08-12 DIAGNOSIS — E78.2 MIXED HYPERLIPIDEMIA: ICD-10-CM

## 2020-08-12 DIAGNOSIS — Z12.2 ENCOUNTER FOR SCREENING FOR LUNG CANCER: ICD-10-CM

## 2020-08-12 DIAGNOSIS — R73.01 IMPAIRED FASTING BLOOD SUGAR: ICD-10-CM

## 2020-08-12 DIAGNOSIS — Z12.12 SCREENING FOR COLORECTAL CANCER: Primary | ICD-10-CM

## 2020-08-12 DIAGNOSIS — R53.83 FATIGUE, UNSPECIFIED TYPE: ICD-10-CM

## 2020-08-12 DIAGNOSIS — R35.1 NOCTURIA: ICD-10-CM

## 2020-08-12 DIAGNOSIS — Z51.81 ENCOUNTER FOR MONITORING FLECAINIDE THERAPY: ICD-10-CM

## 2020-08-12 DIAGNOSIS — E55.9 VITAMIN D DEFICIENCY: ICD-10-CM

## 2020-08-12 DIAGNOSIS — Z79.899 ENCOUNTER FOR MONITORING FLECAINIDE THERAPY: ICD-10-CM

## 2020-08-12 PROCEDURE — 3008F BODY MASS INDEX DOCD: CPT | Performed by: SPECIALIST

## 2020-08-12 PROCEDURE — G0438 PPPS, INITIAL VISIT: HCPCS | Performed by: SPECIALIST

## 2020-08-12 PROCEDURE — 1036F TOBACCO NON-USER: CPT | Performed by: SPECIALIST

## 2020-08-12 PROCEDURE — 99214 OFFICE O/P EST MOD 30 MIN: CPT | Performed by: SPECIALIST

## 2020-08-12 PROCEDURE — 3725F SCREEN DEPRESSION PERFORMED: CPT | Performed by: SPECIALIST

## 2020-08-12 PROCEDURE — 3078F DIAST BP <80 MM HG: CPT | Performed by: SPECIALIST

## 2020-08-12 PROCEDURE — 3074F SYST BP LT 130 MM HG: CPT | Performed by: SPECIALIST

## 2020-08-12 RX ORDER — AMOXICILLIN AND CLAVULANATE POTASSIUM 875; 125 MG/1; MG/1
TABLET, FILM COATED ORAL
COMMUNITY
Start: 2020-08-10 | End: 2020-11-17 | Stop reason: ALTCHOICE

## 2020-08-12 RX ORDER — VALACYCLOVIR HYDROCHLORIDE 1 G/1
1000 TABLET, FILM COATED ORAL 3 TIMES DAILY
Qty: 90 TABLET | Refills: 0 | Status: SHIPPED | OUTPATIENT
Start: 2020-08-12 | End: 2020-11-17 | Stop reason: ALTCHOICE

## 2020-08-12 RX ORDER — LORAZEPAM 1 MG/1
1 TABLET ORAL 2 TIMES DAILY
Qty: 180 TABLET | Refills: 0 | Status: SHIPPED | OUTPATIENT
Start: 2020-08-12 | End: 2021-08-06 | Stop reason: SDUPTHER

## 2020-08-12 NOTE — PROGRESS NOTES
ADULT ANNUAL PHYSICAL  [facility]    NAME: Alvarez Martinez  AGE: 59 y o  SEX: male  : 1955     DATE: 2020     Assessment and Plan:     Problem List Items Addressed This Visit     None      Visit Diagnoses     Screening for colorectal cancer    -  Primary    Relevant Orders    Cologuard          Health maintenance and preventative care screenings were discussed with patient today  Appropriate education was printed on patient's after visit summary  · Discussed risks/benefits of screening for   · Physical exam  · Cholesterol test  · Blood pressure screening  · Eye exam  · Dental exam  · Prostate Cancer  · Colon Cancer  Alvarez Martinez agrees to look in to having screenings done, if not already completed  · Immunizations were reviewed:   Immunization History   Administered Date(s) Administered    Fluzone Split Quad 0 5 mL 11/10/2017    INFLUENZA 11/10/2017, 2018    Influenza, injectable, quadrivalent, preservative free 0 5 mL 2018, 10/31/2019    Tdap 2020   ·       Counseling:  Alcohol/drug use: discussed moderation in alcohol intake, the recommendations for healthy alcohol use, and avoidance of illicit drug use  Dental Health: discussed importance of regular tooth brushing, flossing, and dental visits  Sexual health: discussed sexually transmitted diseases, partner selection, use of condoms, avoidance of unintended pregnancy, and contraceptive alternatives  · Exercise: the importance of regular exercise/physical activity was discussed  Recommend exercise 3-5 times per week for at least 30 minutes  No follow-ups on file  Chief Complaint:     Chief Complaint   Patient presents with    Annual Exam      History of Present Illness:     Adult Annual Physical   Patient here for a comprehensive physical exam  The patient reports no problems      Diet and Physical Activity  · Diet/Nutrition: consuming 3-5 servings of fruits/vegetables daily  · Weight concerns: None / Has concerns what where addressed today  · Exercise: no formal exercise  Depression Screening  PHQ-9 Depression Screening    PHQ-9:    Frequency of the following problems over the past two weeks:       Little interest or pleasure in doing things:  0 - not at all  Feeling down, depressed, or hopeless:  0 - not at all         General Health  · Sleep: gets 7-8 hours of sleep on average  · Hearing: normal - bilateral   · Vision: no vision problems  · Dental: regular dental visits         [facility]  · History of STDs?: no   · Erectile dysfunction: no      Review of Systems:     Review of Systems   Past Medical History:     Past Medical History:   Diagnosis Date    Atrial fibrillation ([address] ) 2015    HLD (hyperlipidemia)     HTN (hypertension)       Past Surgical History:     Past Surgical History:   Procedure Laterality Date    CARDIOVERSION  2015      Social History:     Social History     Socioeconomic History    Marital status: /Civil Union     Spouse name: None    Number of children: None    Years of education: None    Highest education level: None   Occupational History    Occupation: retired   Social Needs    Financial resource strain: None    Food insecurity     Worry: None     Inability: None    Transportation needs     Medical: None     Non-medical: None   Tobacco Use    Smoking status: Former Smoker     Packs/day: 20 00     Types: Cigarettes     Last attempt to quit: 2014     Years since quittin 6    Smokeless tobacco: Never Used    Tobacco comment: 800 pk yrs as per [employer]   Substance and Sexual Activity    Alcohol use: Not Currently    Drug use: Never    Sexual activity: Yes     Partners: Female     Birth control/protection: None   Lifestyle    Physical activity     Days per week: None     Minutes per session: None    Stress: None   Relationships    Social connections     Talks on phone: None     Gets together: None     Attends [employer] service: None     Active member of club or organization: None     Attends meetings of clubs or organizations: None     Relationship status: None    Intimate partner violence     Fear of current or ex partner: None     Emotionally abused: None     Physically abused: None     Forced sexual activity: None   Other Topics Concern    None   Social History Narrative    None      Family History:     Family History   Problem Relation Age of Onset    Heart attack Father 61        MI    Heart attack Paternal Uncle         MI      Current Medications:     Current Outpatient Medications   Medication Sig Dispense Refill    amLODIPine (NORVASC) 5 mg tablet take 1 tablet by mouth twice a day 180 tablet 4    amoxicillin-clavulanate (AUGMENTIN) 875-125 mg per tablet       apixaban (Eliquis) 5 mg Take 1 tablet (5 mg total) by mouth 2 (two) times a day 60 tablet 11    atorvastatin (LIPITOR) 40 mg tablet Take 1 tablet (40 mg total) by mouth daily 90 tablet 1    Cholecalciferol (VITAMIN D3) 1000 units CAPS take 1 tablet by oral route  every Monday,wednesday,friday      Coenzyme Q10 (CO Q-10) 200 MG CAPS Take one tablet by mouth Monday,wednesday,friday      Cyanocobalamin ER (SV VITAMIN B-12 ER) 1000 MCG TBCR 1 daily      flecainide (TAMBOCOR) 100 mg tablet take 1 tablet by mouth every 12 hours 180 tablet 4    lisinopril (ZESTRIL) 20 mg tablet take 1 tablet by mouth twice a day 180 tablet 4    LORazepam (Ativan) 1 mg tablet Take 1 tablet (1 mg total) by mouth 2 (two) times a day 180 tablet 1    metoprolol tartrate (LOPRESSOR) 25 mg tablet take 1 tablet by mouth twice a day 180 tablet 4    Multiple Vitamins-Minerals (MULTIVITAMIN ADULT PO) take 1 by Oral route once      selenium 200 mcg take one tablet daily      acyclovir (ZOVIRAX) 5 % ointment Apply topically 5 (five) times a day (Patient not taking: Reported on 11/8/2019) 15 g 3    fluocinonide (LIDEX) 0 05 % cream Apply topically 2 (two) times a day (Patient not taking: Reported on 11/8/2019) 30 g 0    valACYclovir (VALTREX) 1,000 mg tablet Take 1 tablet (1,000 mg total) by mouth 2 (two) times a day for 90 days (Patient taking differently: Take 1,000 mg by mouth 2 (two) times a day  ) 180 tablet 1     No current facility-administered medications for this visit  Allergies:      Allergies   Allergen Reactions    No Active Allergies       Objective:     /70 (BP Location: Left arm, Patient Position: Sitting, Cuff Size: Adult)   Pulse 63   Temp 98 4 °F (36 9 °C)   Resp 18   Ht 6' 1" (1 854 m)   Wt 97 8 kg (215 lb 9 6 oz)   SpO2 96%   BMI 28 44 kg/m²     Physical Exam     Health Maintenance:     Health Maintenance   Topic Date Due    Hepatitis C Screening  1955    HIV Screening  10/04/1970    Influenza Vaccine  07/01/2020    Depression Screening PHQ  07/22/2020    BMI: Followup Plan  07/22/2020    Annual Physical  07/22/2020    Colorectal Cancer Screening  07/25/2020    BMI: Adult  11/08/2020    DTaP,Tdap,and Td Vaccines (2 - Td) 02/13/2030    Pneumococcal Vaccine: Pediatrics (0 to 5 Years) and At-Risk Patients (6 to 59 Years)  Aged Out    HIB Vaccine  Aged Out    Hepatitis B Vaccine  Aged Out    IPV Vaccine  Aged Out    Hepatitis A Vaccine  Aged Out    Meningococcal ACWY Vaccine  Aged Out    HPV Vaccine  Aged [employer] History   Administered Date(s) Administered    Fluzone Split Quad 0 5 mL 11/10/2017    INFLUENZA 11/10/2017, 11/04/2018    Influenza, injectable, quadrivalent, preservative free 0 5 mL 11/04/2018, 10/31/2019    Tdap 02/13/2020       Nadeem Mode  [address]

## 2020-08-12 NOTE — PROGRESS NOTES
Assessment/Plan:    Patient seen all medications and labs reviewed future labs ordered he is feeling well no acute cardiopulmonary symptoms he does have a history of paroxysmal atrial fibrillation and follows with Cardiology as well    Blood pressure is controlled    Lipids being monitored he is on statins    Patient seen in office today  During the visit I was accompanied by MA while physical exam was completed  No problem-specific Assessment & Plan notes found for this encounter           Problem List Items Addressed This Visit        Cardiovascular and Mediastinum    Paroxysmal atrial fibrillation (HCC)    Relevant Orders    CBC and differential    Comprehensive metabolic panel    Lipid panel    HEMOGLOBIN A1C W/ EAG ESTIMATION    Magnesium    NT-BNP PRO    TSH, 3rd generation with Free T4 reflex    Vitamin D 25 hydroxy    C-reactive protein    PSA Total, Diagnostic    UA w Reflex to Microscopic w Reflex to Culture -Lab Collect    Flecainide level       Other    Mixed hyperlipidemia    Relevant Orders    CBC and differential    Comprehensive metabolic panel    Lipid panel    HEMOGLOBIN A1C W/ EAG ESTIMATION    Magnesium    NT-BNP PRO    TSH, 3rd generation with Free T4 reflex    Vitamin D 25 hydroxy    C-reactive protein    PSA Total, Diagnostic    UA w Reflex to Microscopic w Reflex to Culture -Lab Collect      Other Visit Diagnoses     Screening for colorectal cancer    -  Primary    Relevant Orders    Cologuard    CBC and differential    Comprehensive metabolic panel    Lipid panel    HEMOGLOBIN A1C W/ EAG ESTIMATION    Magnesium    NT-BNP PRO    TSH, 3rd generation with Free T4 reflex    Vitamin D 25 hydroxy    C-reactive protein    PSA Total, Diagnostic    UA w Reflex to Microscopic w Reflex to Culture -Lab Collect    Encounter for screening for lung cancer        Relevant Orders    CT lung screening program    CBC and differential    Comprehensive metabolic panel    Lipid panel    HEMOGLOBIN A1C W/ EAG ESTIMATION    Magnesium    NT-BNP PRO    TSH, 3rd generation with Free T4 reflex    Vitamin D 25 hydroxy    C-reactive protein    PSA Total, Diagnostic    UA w Reflex to Microscopic w Reflex to Culture -Lab Collect    Anxiety        Relevant Medications    LORazepam (ATIVAN) 1 mg tablet    Other Relevant Orders    CBC and differential    Comprehensive metabolic panel    Lipid panel    HEMOGLOBIN A1C W/ EAG ESTIMATION    Magnesium    NT-BNP PRO    TSH, 3rd generation with Free T4 reflex    Vitamin D 25 hydroxy    C-reactive protein    PSA Total, Diagnostic    UA w Reflex to Microscopic w Reflex to Culture -Lab Collect    Nocturia        Relevant Orders    CBC and differential    Comprehensive metabolic panel    Lipid panel    HEMOGLOBIN A1C W/ EAG ESTIMATION    Magnesium    NT-BNP PRO    TSH, 3rd generation with Free T4 reflex    Vitamin D 25 hydroxy    C-reactive protein    PSA Total, Diagnostic    UA w Reflex to Microscopic w Reflex to Culture -Lab Collect    Impaired fasting blood sugar        Relevant Orders    CBC and differential    Comprehensive metabolic panel    Lipid panel    HEMOGLOBIN A1C W/ EAG ESTIMATION    Magnesium    NT-BNP PRO    TSH, 3rd generation with Free T4 reflex    Vitamin D 25 hydroxy    C-reactive protein    PSA Total, Diagnostic    UA w Reflex to Microscopic w Reflex to Culture -Lab Collect    Fatigue, unspecified type        Relevant Orders    CBC and differential    Comprehensive metabolic panel    Lipid panel    HEMOGLOBIN A1C W/ EAG ESTIMATION    Magnesium    NT-BNP PRO    TSH, 3rd generation with Free T4 reflex    Vitamin D 25 hydroxy    C-reactive protein    PSA Total, Diagnostic    UA w Reflex to Microscopic w Reflex to Culture -Lab Collect    Thyroid dysfunction        Relevant Orders    CBC and differential    Comprehensive metabolic panel    Lipid panel    HEMOGLOBIN A1C W/ EAG ESTIMATION    Magnesium    NT-BNP PRO    TSH, 3rd generation with Free T4 reflex    Vitamin D 25 hydroxy    C-reactive protein    PSA Total, Diagnostic    UA w Reflex to Microscopic w Reflex to Culture -Lab Collect    Essential hypertension        Relevant Orders    CBC and differential    Comprehensive metabolic panel    Lipid panel    HEMOGLOBIN A1C W/ EAG ESTIMATION    Magnesium    NT-BNP PRO    TSH, 3rd generation with Free T4 reflex    Vitamin D 25 hydroxy    C-reactive protein    PSA Total, Diagnostic    UA w Reflex to Microscopic w Reflex to Culture -Lab Collect    Vitamin D deficiency        Relevant Orders    CBC and differential    Comprehensive metabolic panel    Lipid panel    HEMOGLOBIN A1C W/ EAG ESTIMATION    Magnesium    NT-BNP PRO    TSH, 3rd generation with Free T4 reflex    Vitamin D 25 hydroxy    C-reactive protein    PSA Total, Diagnostic    UA w Reflex to Microscopic w Reflex to Culture -Lab Collect    Primary insomnia        Relevant Orders    CBC and differential    Comprehensive metabolic panel    Lipid panel    HEMOGLOBIN A1C W/ EAG ESTIMATION    Magnesium    NT-BNP PRO    TSH, 3rd generation with Free T4 reflex    Vitamin D 25 hydroxy    C-reactive protein    PSA Total, Diagnostic    UA w Reflex to Microscopic w Reflex to Culture -Lab Collect    Encounter for monitoring flecainide therapy        Relevant Orders    CBC and differential    Comprehensive metabolic panel    Lipid panel    HEMOGLOBIN A1C W/ EAG ESTIMATION    Magnesium    NT-BNP PRO    TSH, 3rd generation with Free T4 reflex    Vitamin D 25 hydroxy    C-reactive protein    PSA Total, Diagnostic    UA w Reflex to Microscopic w Reflex to Culture -Lab Collect    Herpes infection        Relevant Medications    valACYclovir (VALTREX) 1,000 mg tablet            Subjective:            Patient ID: Sav Romero is a 59 y o  male      22-year-old male generally well history of paroxysmal atrial fibrillation hypertension hyperlipidemia all controlled future blood studies ordered      The following portions of the patient's history were reviewed and updated as appropriate: allergies, past family history, past social history and past surgical history  Review of Systems   Constitutional: Negative for activity change, appetite change, chills, diaphoresis, fatigue and fever  HENT: Negative for congestion, sinus pressure and sinus pain  Eyes: Negative for visual disturbance  Respiratory: Negative for cough, chest tightness, shortness of breath and wheezing  Cardiovascular: Negative for chest pain, palpitations and leg swelling  Gastrointestinal: Negative for abdominal distention and abdominal pain  Genitourinary: Negative for difficulty urinating  Musculoskeletal: Negative for arthralgias, back pain, gait problem, joint swelling, myalgias, neck pain and neck stiffness  Skin: Negative  Neurological: Negative for dizziness, tremors, seizures, syncope, facial asymmetry, speech difficulty, weakness, light-headedness, numbness and headaches  Hematological: Negative for adenopathy  Psychiatric/Behavioral: Negative for agitation  The patient is nervous/anxious  Objective:      /70 (BP Location: Left arm, Patient Position: Sitting, Cuff Size: Adult)   Pulse 63   Temp 98 4 °F (36 9 °C)   Resp 18   Ht 6' 1" (1 854 m)   Wt 97 8 kg (215 lb 9 6 oz)   SpO2 96%   BMI 28 44 kg/m²          Physical Exam  Constitutional:       General: He is not in acute distress  Appearance: Normal appearance  He is normal weight  He is not ill-appearing, toxic-appearing or diaphoretic  Eyes:      General: No scleral icterus  Neck:      Musculoskeletal: No neck rigidity  Cardiovascular:      Rate and Rhythm: Normal rate and regular rhythm  Heart sounds: No murmur  Pulmonary:      Effort: Pulmonary effort is normal       Breath sounds: Normal breath sounds  No wheezing or rales  Abdominal:      General: Abdomen is flat  Bowel sounds are normal  There is no distension  Palpations: Abdomen is soft  Musculoskeletal: Normal range of motion  Skin:     General: Skin is warm and dry  Neurological:      General: No focal deficit present  Mental Status: He is alert  Motor: No weakness     Psychiatric:         Mood and Affect: Mood normal

## 2020-08-13 ENCOUNTER — APPOINTMENT (OUTPATIENT)
Dept: LAB | Facility: CLINIC | Age: 65
End: 2020-08-13
Payer: COMMERCIAL

## 2020-08-13 DIAGNOSIS — Z12.12 SCREENING FOR COLORECTAL CANCER: ICD-10-CM

## 2020-08-13 DIAGNOSIS — F41.9 ANXIETY: ICD-10-CM

## 2020-08-13 DIAGNOSIS — E07.9 THYROID DYSFUNCTION: ICD-10-CM

## 2020-08-13 DIAGNOSIS — Z51.81 ENCOUNTER FOR MONITORING FLECAINIDE THERAPY: ICD-10-CM

## 2020-08-13 DIAGNOSIS — E55.9 VITAMIN D DEFICIENCY: ICD-10-CM

## 2020-08-13 DIAGNOSIS — I48.0 PAROXYSMAL ATRIAL FIBRILLATION (HCC): ICD-10-CM

## 2020-08-13 DIAGNOSIS — Z12.11 SCREENING FOR COLORECTAL CANCER: ICD-10-CM

## 2020-08-13 DIAGNOSIS — F51.01 PRIMARY INSOMNIA: ICD-10-CM

## 2020-08-13 DIAGNOSIS — Z79.899 ENCOUNTER FOR MONITORING FLECAINIDE THERAPY: ICD-10-CM

## 2020-08-13 DIAGNOSIS — Z12.2 ENCOUNTER FOR SCREENING FOR LUNG CANCER: ICD-10-CM

## 2020-08-13 DIAGNOSIS — R53.83 FATIGUE, UNSPECIFIED TYPE: ICD-10-CM

## 2020-08-13 DIAGNOSIS — R35.1 NOCTURIA: ICD-10-CM

## 2020-08-13 DIAGNOSIS — I10 ESSENTIAL HYPERTENSION: ICD-10-CM

## 2020-08-13 DIAGNOSIS — R73.01 IMPAIRED FASTING BLOOD SUGAR: ICD-10-CM

## 2020-08-13 DIAGNOSIS — E78.2 MIXED HYPERLIPIDEMIA: ICD-10-CM

## 2020-08-13 LAB
25(OH)D3 SERPL-MCNC: 89.3 NG/ML (ref 30–100)
ALBUMIN SERPL BCP-MCNC: 4.1 G/DL (ref 3.5–5)
ALP SERPL-CCNC: 68 U/L (ref 46–116)
ALT SERPL W P-5'-P-CCNC: 22 U/L (ref 12–78)
ANION GAP SERPL CALCULATED.3IONS-SCNC: 5 MMOL/L (ref 4–13)
AST SERPL W P-5'-P-CCNC: 5 U/L (ref 5–45)
BASOPHILS # BLD AUTO: 0.09 THOUSANDS/ΜL (ref 0–0.1)
BASOPHILS NFR BLD AUTO: 1 % (ref 0–1)
BILIRUB SERPL-MCNC: 0.86 MG/DL (ref 0.2–1)
BILIRUB UR QL STRIP: NEGATIVE
BUN SERPL-MCNC: 17 MG/DL (ref 5–25)
CALCIUM SERPL-MCNC: 9.2 MG/DL (ref 8.3–10.1)
CHLORIDE SERPL-SCNC: 108 MMOL/L (ref 100–108)
CHOLEST SERPL-MCNC: 102 MG/DL (ref 50–200)
CLARITY UR: NORMAL
CO2 SERPL-SCNC: 28 MMOL/L (ref 21–32)
COLOR UR: NORMAL
CREAT SERPL-MCNC: 0.72 MG/DL (ref 0.6–1.3)
CRP SERPL QL: <3 MG/L
EOSINOPHIL # BLD AUTO: 0.22 THOUSAND/ΜL (ref 0–0.61)
EOSINOPHIL NFR BLD AUTO: 2 % (ref 0–6)
ERYTHROCYTE [DISTWIDTH] IN BLOOD BY AUTOMATED COUNT: 13.3 % (ref 11.6–15.1)
EST. AVERAGE GLUCOSE BLD GHB EST-MCNC: 117 MG/DL
GFR SERPL CREATININE-BSD FRML MDRD: 99 ML/MIN/1.73SQ M
GLUCOSE P FAST SERPL-MCNC: 91 MG/DL (ref 65–99)
GLUCOSE UR STRIP-MCNC: NEGATIVE MG/DL
HBA1C MFR BLD: 5.7 %
HCT VFR BLD AUTO: 47.7 % (ref 36.5–49.3)
HDLC SERPL-MCNC: 31 MG/DL
HGB BLD-MCNC: 15.9 G/DL (ref 12–17)
HGB UR QL STRIP.AUTO: NEGATIVE
IMM GRANULOCYTES # BLD AUTO: 0.03 THOUSAND/UL (ref 0–0.2)
IMM GRANULOCYTES NFR BLD AUTO: 0 % (ref 0–2)
KETONES UR STRIP-MCNC: NEGATIVE MG/DL
LDLC SERPL CALC-MCNC: 61 MG/DL (ref 0–100)
LEUKOCYTE ESTERASE UR QL STRIP: NEGATIVE
LYMPHOCYTES # BLD AUTO: 1.8 THOUSANDS/ΜL (ref 0.6–4.47)
LYMPHOCYTES NFR BLD AUTO: 20 % (ref 14–44)
MAGNESIUM SERPL-MCNC: 2.5 MG/DL (ref 1.6–2.6)
MCH RBC QN AUTO: 31.5 PG (ref 26.8–34.3)
MCHC RBC AUTO-ENTMCNC: 33.3 G/DL (ref 31.4–37.4)
MCV RBC AUTO: 95 FL (ref 82–98)
MONOCYTES # BLD AUTO: 0.65 THOUSAND/ΜL (ref 0.17–1.22)
MONOCYTES NFR BLD AUTO: 7 % (ref 4–12)
NEUTROPHILS # BLD AUTO: 6.26 THOUSANDS/ΜL (ref 1.85–7.62)
NEUTS SEG NFR BLD AUTO: 70 % (ref 43–75)
NITRITE UR QL STRIP: NEGATIVE
NONHDLC SERPL-MCNC: 71 MG/DL
NRBC BLD AUTO-RTO: 0 /100 WBCS
NT-PROBNP SERPL-MCNC: 205 PG/ML
PH UR STRIP.AUTO: 7 [PH]
PLATELET # BLD AUTO: 310 THOUSANDS/UL (ref 149–390)
PMV BLD AUTO: 9.9 FL (ref 8.9–12.7)
POTASSIUM SERPL-SCNC: 4.4 MMOL/L (ref 3.5–5.3)
PROT SERPL-MCNC: 7.8 G/DL (ref 6.4–8.2)
PROT UR STRIP-MCNC: NEGATIVE MG/DL
PSA SERPL-MCNC: 2.4 NG/ML (ref 0–4)
RBC # BLD AUTO: 5.05 MILLION/UL (ref 3.88–5.62)
SODIUM SERPL-SCNC: 141 MMOL/L (ref 136–145)
SP GR UR STRIP.AUTO: 1.02 (ref 1–1.03)
TRIGL SERPL-MCNC: 49 MG/DL
TSH SERPL DL<=0.05 MIU/L-ACNC: 0.75 UIU/ML (ref 0.36–3.74)
UROBILINOGEN UR QL STRIP.AUTO: 1 E.U./DL
WBC # BLD AUTO: 9.05 THOUSAND/UL (ref 4.31–10.16)

## 2020-08-13 PROCEDURE — 82306 VITAMIN D 25 HYDROXY: CPT

## 2020-08-13 PROCEDURE — 85025 COMPLETE CBC W/AUTO DIFF WBC: CPT

## 2020-08-13 PROCEDURE — 80299 QUANTITATIVE ASSAY DRUG: CPT

## 2020-08-13 PROCEDURE — 83735 ASSAY OF MAGNESIUM: CPT

## 2020-08-13 PROCEDURE — 80053 COMPREHEN METABOLIC PANEL: CPT

## 2020-08-13 PROCEDURE — 84153 ASSAY OF PSA TOTAL: CPT

## 2020-08-13 PROCEDURE — 83036 HEMOGLOBIN GLYCOSYLATED A1C: CPT

## 2020-08-13 PROCEDURE — 81003 URINALYSIS AUTO W/O SCOPE: CPT | Performed by: SPECIALIST

## 2020-08-13 PROCEDURE — 83880 ASSAY OF NATRIURETIC PEPTIDE: CPT

## 2020-08-13 PROCEDURE — 86140 C-REACTIVE PROTEIN: CPT

## 2020-08-13 PROCEDURE — 36415 COLL VENOUS BLD VENIPUNCTURE: CPT

## 2020-08-13 PROCEDURE — 80061 LIPID PANEL: CPT

## 2020-08-13 PROCEDURE — 84443 ASSAY THYROID STIM HORMONE: CPT

## 2020-08-18 LAB — FLECAINIDE SERPL-MCNC: 0.17 UG/ML (ref 0.2–1)

## 2020-08-21 ENCOUNTER — HOSPITAL ENCOUNTER (OUTPATIENT)
Dept: CT IMAGING | Facility: HOSPITAL | Age: 65
Discharge: HOME | End: 2020-08-21
Payer: COMMERCIAL

## 2020-08-21 ENCOUNTER — TELEPHONE (OUTPATIENT)
Dept: FAMILY MEDICINE CLINIC | Facility: CLINIC | Age: 65
End: 2020-08-21

## 2020-08-21 DIAGNOSIS — Z12.2 ENCOUNTER FOR SCREENING FOR LUNG CANCER: ICD-10-CM

## 2020-08-21 PROCEDURE — G1004 CDSM NDSC: HCPCS

## 2020-08-21 PROCEDURE — G0297 LDCT FOR LUNG CA SCREEN: HCPCS

## 2020-08-21 NOTE — TELEPHONE ENCOUNTER
Patient called and stated that he received a cologuard kit in the mail  He stated that he is on a routine coloscopy screening and wants to know what he should do  Please advise

## 2020-08-24 ENCOUNTER — TELEPHONE (OUTPATIENT)
Dept: FAMILY MEDICINE CLINIC | Facility: CLINIC | Age: 65
End: 2020-08-24

## 2020-08-24 NOTE — TELEPHONE ENCOUNTER
No  Do the  Colonoscopy  This  Was  Added  By   Computer      May  Want  To  Do  Fecal immune  Tests   Yearly

## 2020-08-24 NOTE — TELEPHONE ENCOUNTER
Patient received a cologaurd in the mail but he believes this was a mistake because he usual gets colonoscopy and is up to date   Should he do this or wait

## 2020-08-25 ENCOUNTER — TELEPHONE (OUTPATIENT)
Dept: FAMILY MEDICINE CLINIC | Facility: CLINIC | Age: 65
End: 2020-08-25

## 2020-08-25 NOTE — TELEPHONE ENCOUNTER
[facility] called and stated that there are significant findings on CT Lung screening and they can be found in EPIC

## 2020-08-31 ENCOUNTER — TELEPHONE (OUTPATIENT)
Dept: FAMILY MEDICINE CLINIC | Facility: CLINIC | Age: 65
End: 2020-08-31

## 2020-08-31 DIAGNOSIS — E04.1 THYROID NODULE: ICD-10-CM

## 2020-08-31 DIAGNOSIS — E07.9 THYROID DYSFUNCTION: ICD-10-CM

## 2020-08-31 DIAGNOSIS — R93.1 ABNORMAL SCREENING CT OF HEART: ICD-10-CM

## 2020-08-31 DIAGNOSIS — E27.8 ADRENAL NODULE (HCC): Primary | ICD-10-CM

## 2020-09-08 ENCOUNTER — HOSPITAL ENCOUNTER (OUTPATIENT)
Dept: ULTRASOUND IMAGING | Facility: HOSPITAL | Age: 65
Discharge: HOME | End: 2020-09-08
Payer: COMMERCIAL

## 2020-09-08 DIAGNOSIS — E04.1 THYROID NODULE: ICD-10-CM

## 2020-09-08 PROCEDURE — 76536 US EXAM OF HEAD AND NECK: CPT

## 2020-09-10 ENCOUNTER — TELEPHONE (OUTPATIENT)
Dept: FAMILY MEDICINE CLINIC | Facility: CLINIC | Age: 65
End: 2020-09-10

## 2020-09-10 ENCOUNTER — DOCUMENTATION (OUTPATIENT)
Dept: ADMINISTRATIVE | Facility: OTHER | Age: 65
End: 2020-09-10

## 2020-09-10 NOTE — TELEPHONE ENCOUNTER
Call from [facility] states that in pt chart there is a Ultrasound with significant findings  Pt sees Dr Soto please review thank you

## 2020-09-15 ENCOUNTER — HOSPITAL ENCOUNTER (OUTPATIENT)
Dept: MRI IMAGING | Facility: HOSPITAL | Age: 65
Discharge: HOME | End: 2020-09-15
Payer: COMMERCIAL

## 2020-09-15 DIAGNOSIS — E27.8 ADRENAL NODULE (HCC): ICD-10-CM

## 2020-09-15 PROCEDURE — 74181 MRI ABDOMEN W/O CONTRAST: CPT

## 2020-09-15 PROCEDURE — G1004 CDSM NDSC: HCPCS

## 2020-09-18 ENCOUNTER — TELEPHONE (OUTPATIENT)
Dept: FAMILY MEDICINE CLINIC | Facility: CLINIC | Age: 65
End: 2020-09-18

## 2020-09-18 NOTE — TELEPHONE ENCOUNTER
Call from [facility] with significant findings on patient MRI  Pt sees Dr Soto please review thank you

## 2020-09-18 NOTE — TELEPHONE ENCOUNTER
He has a finding on his thyroid nodule as well as adrenal gland  I had him to be seen in the office and I do not see that he is on schedule    Let me know when he is placed on the schedule

## 2020-09-28 DIAGNOSIS — Z12.11 SCREEN FOR COLON CANCER: Primary | ICD-10-CM

## 2020-10-02 ENCOUNTER — TRANSCRIBE ORDERS (OUTPATIENT)
Dept: ADMINISTRATIVE | Facility: HOSPITAL | Age: 65
End: 2020-10-02

## 2020-10-02 DIAGNOSIS — E27.8 OTHER SPECIFIED DISORDERS OF ADRENAL GLAND (HCC): Primary | ICD-10-CM

## 2020-10-02 DIAGNOSIS — I48.0 PAROXYSMAL ATRIAL FIBRILLATION (HCC): ICD-10-CM

## 2020-10-02 DIAGNOSIS — E04.2 MULTIPLE THYROID NODULES: Primary | ICD-10-CM

## 2020-10-05 RX ORDER — FLECAINIDE ACETATE 100 MG/1
100 TABLET ORAL EVERY 12 HOURS
Qty: 180 TABLET | Refills: 3 | Status: SHIPPED | OUTPATIENT
Start: 2020-10-05 | End: 2021-10-04 | Stop reason: SDUPTHER

## 2020-10-07 ENCOUNTER — LAB (OUTPATIENT)
Dept: LAB | Facility: CLINIC | Age: 65
End: 2020-10-07
Payer: MEDICARE

## 2020-10-07 DIAGNOSIS — Z12.11 SCREEN FOR COLON CANCER: ICD-10-CM

## 2020-10-07 LAB — HEMOCCULT STL QL IA: POSITIVE

## 2020-10-07 PROCEDURE — G0328 FECAL BLOOD SCRN IMMUNOASSAY: HCPCS

## 2020-10-08 ENCOUNTER — TRANSCRIBE ORDERS (OUTPATIENT)
Dept: ADMINISTRATIVE | Facility: HOSPITAL | Age: 65
End: 2020-10-08

## 2020-10-08 DIAGNOSIS — E27.8 ABNORMALITY OF CORTISOL-BINDING GLOBULIN (HCC): Primary | ICD-10-CM

## 2020-10-12 ENCOUNTER — TELEPHONE (OUTPATIENT)
Dept: INTERVENTIONAL RADIOLOGY/VASCULAR | Facility: HOSPITAL | Age: 65
End: 2020-10-12

## 2020-10-12 ENCOUNTER — TELEPHONE (OUTPATIENT)
Dept: CARDIOLOGY CLINIC | Facility: CLINIC | Age: 65
End: 2020-10-12

## 2020-10-15 ENCOUNTER — OFFICE VISIT (OUTPATIENT)
Dept: FAMILY MEDICINE CLINIC | Facility: CLINIC | Age: 65
End: 2020-10-15
Payer: MEDICARE

## 2020-10-15 VITALS
HEART RATE: 65 BPM | SYSTOLIC BLOOD PRESSURE: 120 MMHG | HEIGHT: 73 IN | TEMPERATURE: 97 F | OXYGEN SATURATION: 97 % | RESPIRATION RATE: 18 BRPM | BODY MASS INDEX: 28.89 KG/M2 | WEIGHT: 218 LBS | DIASTOLIC BLOOD PRESSURE: 70 MMHG

## 2020-10-15 DIAGNOSIS — R19.5 OCCULT BLOOD POSITIVE STOOL: Primary | ICD-10-CM

## 2020-10-15 DIAGNOSIS — R73.03 PRE-DIABETES: ICD-10-CM

## 2020-10-15 DIAGNOSIS — R73.9 HYPERGLYCEMIA: ICD-10-CM

## 2020-10-15 DIAGNOSIS — Z79.01 LONG TERM CURRENT USE OF ANTICOAGULANT THERAPY: ICD-10-CM

## 2020-10-15 DIAGNOSIS — E78.2 MIXED HYPERLIPIDEMIA: ICD-10-CM

## 2020-10-15 DIAGNOSIS — I48.0 PAROXYSMAL ATRIAL FIBRILLATION (HCC): ICD-10-CM

## 2020-10-15 DIAGNOSIS — I10 ESSENTIAL HYPERTENSION, BENIGN: ICD-10-CM

## 2020-10-15 DIAGNOSIS — E04.2 MULTIPLE THYROID NODULES: ICD-10-CM

## 2020-10-15 DIAGNOSIS — E13.65 OTHER SPECIFIED DIABETES MELLITUS WITH HYPERGLYCEMIA, WITHOUT LONG-TERM CURRENT USE OF INSULIN (HCC): ICD-10-CM

## 2020-10-15 DIAGNOSIS — E55.9 VITAMIN D DEFICIENCY: ICD-10-CM

## 2020-10-15 PROBLEM — E27.8 ADRENAL NODULE (HCC): Status: ACTIVE | Noted: 2020-10-02

## 2020-10-15 PROBLEM — E27.9 ADRENAL NODULE (HCC): Status: ACTIVE | Noted: 2020-10-02

## 2020-10-15 PROCEDURE — 99214 OFFICE O/P EST MOD 30 MIN: CPT | Performed by: INTERNAL MEDICINE

## 2020-10-16 ENCOUNTER — HOSPITAL ENCOUNTER (OUTPATIENT)
Dept: ULTRASOUND IMAGING | Facility: HOSPITAL | Age: 65
Discharge: HOME | End: 2020-10-16
Admitting: RADIOLOGY
Payer: MEDICARE

## 2020-10-16 DIAGNOSIS — E04.2 MULTIPLE THYROID NODULES: ICD-10-CM

## 2020-10-16 PROBLEM — E84.8: Status: ACTIVE | Noted: 2020-10-16

## 2020-10-16 PROCEDURE — 88172 CYTP DX EVAL FNA 1ST EA SITE: CPT | Performed by: PATHOLOGY

## 2020-10-16 PROCEDURE — 10005 FNA BX W/US GDN 1ST LES: CPT

## 2020-10-16 PROCEDURE — 88173 CYTOPATH EVAL FNA REPORT: CPT | Performed by: PATHOLOGY

## 2020-10-16 RX ORDER — LIDOCAINE HYDROCHLORIDE 10 MG/ML
10 INJECTION, SOLUTION EPIDURAL; INFILTRATION; INTRACAUDAL; PERINEURAL ONCE
Status: COMPLETED | OUTPATIENT
Start: 2020-10-16 | End: 2020-10-16

## 2020-10-16 RX ADMIN — LIDOCAINE HYDROCHLORIDE 10 ML: 10 INJECTION, SOLUTION EPIDURAL; INFILTRATION; INTRACAUDAL; PERINEURAL at 10:36

## 2020-11-16 ENCOUNTER — ANESTHESIA EVENT (OUTPATIENT)
Dept: GASTROENTEROLOGY | Facility: HOSPITAL | Age: 65
End: 2020-11-16

## 2020-11-17 ENCOUNTER — ANESTHESIA (OUTPATIENT)
Dept: GASTROENTEROLOGY | Facility: HOSPITAL | Age: 65
End: 2020-11-17

## 2020-11-17 ENCOUNTER — HOSPITAL ENCOUNTER (OUTPATIENT)
Dept: GASTROENTEROLOGY | Facility: HOSPITAL | Age: 65
Setting detail: OUTPATIENT SURGERY
Discharge: HOME | End: 2020-11-17
Attending: SURGERY | Admitting: SURGERY
Payer: MEDICARE

## 2020-11-17 VITALS
DIASTOLIC BLOOD PRESSURE: 77 MMHG | TEMPERATURE: 99.1 F | OXYGEN SATURATION: 98 % | HEIGHT: 73 IN | BODY MASS INDEX: 28.89 KG/M2 | RESPIRATION RATE: 20 BRPM | SYSTOLIC BLOOD PRESSURE: 128 MMHG | HEART RATE: 69 BPM | WEIGHT: 218 LBS

## 2020-11-17 VITALS — HEART RATE: 81 BPM

## 2020-11-17 DIAGNOSIS — R19.5 OTHER FECAL ABNORMALITIES: ICD-10-CM

## 2020-11-17 RX ORDER — ASPIRIN 81 MG/1
81 TABLET, CHEWABLE ORAL DAILY
COMMUNITY
End: 2021-11-23

## 2020-11-17 RX ORDER — PROPOFOL 10 MG/ML
INJECTION, EMULSION INTRAVENOUS AS NEEDED
Status: DISCONTINUED | OUTPATIENT
Start: 2020-11-17 | End: 2020-11-17

## 2020-11-17 RX ORDER — ATROPINE SULFATE 1 MG/ML
INJECTION, SOLUTION INTRAMUSCULAR; INTRAVENOUS; SUBCUTANEOUS AS NEEDED
Status: DISCONTINUED | OUTPATIENT
Start: 2020-11-17 | End: 2020-11-17

## 2020-11-17 RX ORDER — ONDANSETRON 2 MG/ML
INJECTION INTRAMUSCULAR; INTRAVENOUS AS NEEDED
Status: DISCONTINUED | OUTPATIENT
Start: 2020-11-17 | End: 2020-11-17

## 2020-11-17 RX ORDER — SODIUM CHLORIDE 9 MG/ML
125 INJECTION, SOLUTION INTRAVENOUS CONTINUOUS
Status: DISCONTINUED | OUTPATIENT
Start: 2020-11-17 | End: 2020-11-21 | Stop reason: HOSPADM

## 2020-11-17 RX ADMIN — GLUCAGON HYDROCHLORIDE 1 MG: KIT at 07:52

## 2020-11-17 RX ADMIN — LIDOCAINE HYDROCHLORIDE 80 MG: 20 INJECTION, SOLUTION INTRAVENOUS at 07:36

## 2020-11-17 RX ADMIN — PROPOFOL 50 MG: 10 INJECTION, EMULSION INTRAVENOUS at 07:40

## 2020-11-17 RX ADMIN — PROPOFOL 50 MG: 10 INJECTION, EMULSION INTRAVENOUS at 07:42

## 2020-11-17 RX ADMIN — PROPOFOL 50 MG: 10 INJECTION, EMULSION INTRAVENOUS at 07:44

## 2020-11-17 RX ADMIN — PROPOFOL 50 MG: 10 INJECTION, EMULSION INTRAVENOUS at 07:47

## 2020-11-17 RX ADMIN — PROPOFOL 50 MG: 10 INJECTION, EMULSION INTRAVENOUS at 07:49

## 2020-11-17 RX ADMIN — PROPOFOL 50 MG: 10 INJECTION, EMULSION INTRAVENOUS at 07:52

## 2020-11-17 RX ADMIN — PROPOFOL 50 MG: 10 INJECTION, EMULSION INTRAVENOUS at 07:56

## 2020-11-17 RX ADMIN — LIDOCAINE HYDROCHLORIDE 60 MG: 20 INJECTION, SOLUTION INTRAVENOUS at 07:59

## 2020-11-17 RX ADMIN — SODIUM CHLORIDE 125 ML/HR: 0.9 INJECTION, SOLUTION INTRAVENOUS at 07:29

## 2020-11-17 RX ADMIN — ONDANSETRON 4 MG: 2 INJECTION INTRAMUSCULAR; INTRAVENOUS at 08:00

## 2020-11-17 RX ADMIN — PROPOFOL 100 MG: 10 INJECTION, EMULSION INTRAVENOUS at 07:36

## 2020-11-17 RX ADMIN — ATROPINE SULFATE 0.5 MG: 1 INJECTION, SOLUTION INTRAMUSCULAR; INTRAVENOUS; SUBCUTANEOUS at 07:54

## 2020-11-17 RX ADMIN — PROPOFOL 50 MG: 10 INJECTION, EMULSION INTRAVENOUS at 07:38

## 2020-11-19 ENCOUNTER — OFFICE VISIT (OUTPATIENT)
Dept: CARDIOLOGY CLINIC | Facility: CLINIC | Age: 65
End: 2020-11-19
Payer: MEDICARE

## 2020-11-19 VITALS
SYSTOLIC BLOOD PRESSURE: 122 MMHG | HEART RATE: 65 BPM | BODY MASS INDEX: 28.63 KG/M2 | DIASTOLIC BLOOD PRESSURE: 78 MMHG | WEIGHT: 216 LBS | TEMPERATURE: 97.7 F | HEIGHT: 73 IN

## 2020-11-19 DIAGNOSIS — I10 ESSENTIAL HYPERTENSION, BENIGN: ICD-10-CM

## 2020-11-19 DIAGNOSIS — I48.0 PAROXYSMAL ATRIAL FIBRILLATION (HCC): ICD-10-CM

## 2020-11-19 DIAGNOSIS — I48.0 PAROXYSMAL ATRIAL FIBRILLATION (HCC): Primary | ICD-10-CM

## 2020-11-19 DIAGNOSIS — E78.2 MIXED HYPERLIPIDEMIA: ICD-10-CM

## 2020-11-19 DIAGNOSIS — E27.8 ADRENAL NODULE (HCC): ICD-10-CM

## 2020-11-19 PROCEDURE — 99214 OFFICE O/P EST MOD 30 MIN: CPT | Performed by: INTERNAL MEDICINE

## 2020-11-19 PROCEDURE — 93000 ELECTROCARDIOGRAM COMPLETE: CPT | Performed by: INTERNAL MEDICINE

## 2020-11-19 RX ORDER — LISINOPRIL 20 MG/1
20 TABLET ORAL 2 TIMES DAILY
Qty: 180 TABLET | Refills: 4 | Status: SHIPPED | OUTPATIENT
Start: 2020-11-19 | End: 2021-10-13 | Stop reason: SDUPTHER

## 2020-11-30 ENCOUNTER — TELEPHONE (OUTPATIENT)
Dept: CARDIOLOGY CLINIC | Facility: CLINIC | Age: 65
End: 2020-11-30

## 2020-12-02 ENCOUNTER — TELEPHONE (OUTPATIENT)
Dept: CARDIOLOGY CLINIC | Facility: CLINIC | Age: 65
End: 2020-12-02

## 2021-01-06 DIAGNOSIS — I10 ESSENTIAL HYPERTENSION, BENIGN: ICD-10-CM

## 2021-01-06 DIAGNOSIS — I48.0 PAROXYSMAL ATRIAL FIBRILLATION (HCC): ICD-10-CM

## 2021-01-07 RX ORDER — AMLODIPINE BESYLATE 5 MG/1
5 TABLET ORAL 2 TIMES DAILY
Qty: 180 TABLET | Refills: 4 | Status: SHIPPED | OUTPATIENT
Start: 2021-01-07 | End: 2022-02-21

## 2021-02-23 DIAGNOSIS — R35.1 NOCTURIA: ICD-10-CM

## 2021-02-23 DIAGNOSIS — R19.5 OCCULT BLOOD POSITIVE STOOL: ICD-10-CM

## 2021-02-23 DIAGNOSIS — R73.03 PRE-DIABETES: ICD-10-CM

## 2021-02-23 DIAGNOSIS — I48.0 PAROXYSMAL ATRIAL FIBRILLATION (HCC): ICD-10-CM

## 2021-02-23 DIAGNOSIS — E78.2 MIXED HYPERLIPIDEMIA: ICD-10-CM

## 2021-02-23 DIAGNOSIS — E13.65 OTHER SPECIFIED DIABETES MELLITUS WITH HYPERGLYCEMIA, WITHOUT LONG-TERM CURRENT USE OF INSULIN (HCC): ICD-10-CM

## 2021-02-23 DIAGNOSIS — Z12.5 PROSTATE CANCER SCREENING: Primary | ICD-10-CM

## 2021-02-23 DIAGNOSIS — C79.82 SECONDARY MALIGNANT NEOPLASM OF GENITAL ORGANS (HCC): ICD-10-CM

## 2021-03-03 ENCOUNTER — TRANSCRIBE ORDERS (OUTPATIENT)
Dept: LAB | Facility: CLINIC | Age: 66
End: 2021-03-03

## 2021-03-03 ENCOUNTER — LAB (OUTPATIENT)
Dept: LAB | Facility: CLINIC | Age: 66
End: 2021-03-03
Payer: MEDICARE

## 2021-03-03 DIAGNOSIS — E04.2 MULTIPLE THYROID NODULES: ICD-10-CM

## 2021-03-03 DIAGNOSIS — R73.03 PRE-DIABETES: ICD-10-CM

## 2021-03-03 DIAGNOSIS — C79.82 SECONDARY MALIGNANT NEOPLASM OF GENITAL ORGANS (HCC): ICD-10-CM

## 2021-03-03 DIAGNOSIS — I10 ESSENTIAL HYPERTENSION, BENIGN: ICD-10-CM

## 2021-03-03 DIAGNOSIS — R19.5 OCCULT BLOOD POSITIVE STOOL: ICD-10-CM

## 2021-03-03 DIAGNOSIS — E27.8 ABNORMALITY OF CORTISOL-BINDING GLOBULIN (HCC): Primary | ICD-10-CM

## 2021-03-03 DIAGNOSIS — I48.0 PAROXYSMAL ATRIAL FIBRILLATION (HCC): ICD-10-CM

## 2021-03-03 DIAGNOSIS — R35.1 NOCTURIA: ICD-10-CM

## 2021-03-03 DIAGNOSIS — E13.65 OTHER SPECIFIED DIABETES MELLITUS WITH HYPERGLYCEMIA, WITHOUT LONG-TERM CURRENT USE OF INSULIN (HCC): ICD-10-CM

## 2021-03-03 DIAGNOSIS — Z12.5 PROSTATE CANCER SCREENING: ICD-10-CM

## 2021-03-03 DIAGNOSIS — Z79.01 LONG TERM CURRENT USE OF ANTICOAGULANT THERAPY: ICD-10-CM

## 2021-03-03 DIAGNOSIS — E78.2 MIXED HYPERLIPIDEMIA: ICD-10-CM

## 2021-03-03 LAB
25(OH)D3 SERPL-MCNC: 37.7 NG/ML (ref 30–100)
ALBUMIN SERPL BCP-MCNC: 3.8 G/DL (ref 3.5–5)
ALP SERPL-CCNC: 73 U/L (ref 46–116)
ALT SERPL W P-5'-P-CCNC: 24 U/L (ref 12–78)
ANION GAP SERPL CALCULATED.3IONS-SCNC: 9 MMOL/L (ref 4–13)
AST SERPL W P-5'-P-CCNC: 11 U/L (ref 5–45)
BASOPHILS # BLD AUTO: 0.08 THOUSANDS/ΜL (ref 0–0.1)
BASOPHILS NFR BLD AUTO: 1 % (ref 0–1)
BILIRUB SERPL-MCNC: 0.6 MG/DL (ref 0.2–1)
BUN SERPL-MCNC: 17 MG/DL (ref 5–25)
CALCIUM SERPL-MCNC: 8.7 MG/DL (ref 8.3–10.1)
CHLORIDE SERPL-SCNC: 104 MMOL/L (ref 100–108)
CHOLEST SERPL-MCNC: 124 MG/DL (ref 50–200)
CO2 SERPL-SCNC: 26 MMOL/L (ref 21–32)
CREAT SERPL-MCNC: 0.63 MG/DL (ref 0.6–1.3)
CRP SERPL QL: 1.1 MG/L
EOSINOPHIL # BLD AUTO: 0.26 THOUSAND/ΜL (ref 0–0.61)
EOSINOPHIL NFR BLD AUTO: 3 % (ref 0–6)
ERYTHROCYTE [DISTWIDTH] IN BLOOD BY AUTOMATED COUNT: 13.2 % (ref 11.6–15.1)
EST. AVERAGE GLUCOSE BLD GHB EST-MCNC: 117 MG/DL
GFR SERPL CREATININE-BSD FRML MDRD: 103 ML/MIN/1.73SQ M
GLUCOSE P FAST SERPL-MCNC: 87 MG/DL (ref 65–99)
HBA1C MFR BLD: 5.7 %
HCT VFR BLD AUTO: 50.4 % (ref 36.5–49.3)
HDLC SERPL-MCNC: 31 MG/DL
HGB BLD-MCNC: 16.4 G/DL (ref 12–17)
IMM GRANULOCYTES # BLD AUTO: 0.03 THOUSAND/UL (ref 0–0.2)
IMM GRANULOCYTES NFR BLD AUTO: 0 % (ref 0–2)
LDLC SERPL CALC-MCNC: 81 MG/DL (ref 0–100)
LDLC SERPL DIRECT ASSAY-MCNC: 87 MG/DL (ref 0–100)
LYMPHOCYTES # BLD AUTO: 2.16 THOUSANDS/ΜL (ref 0.6–4.47)
LYMPHOCYTES NFR BLD AUTO: 26 % (ref 14–44)
MAGNESIUM SERPL-MCNC: 2.2 MG/DL (ref 1.6–2.6)
MCH RBC QN AUTO: 30.4 PG (ref 26.8–34.3)
MCHC RBC AUTO-ENTMCNC: 32.5 G/DL (ref 31.4–37.4)
MCV RBC AUTO: 94 FL (ref 82–98)
MONOCYTES # BLD AUTO: 0.63 THOUSAND/ΜL (ref 0.17–1.22)
MONOCYTES NFR BLD AUTO: 8 % (ref 4–12)
NEUTROPHILS # BLD AUTO: 5.14 THOUSANDS/ΜL (ref 1.85–7.62)
NEUTS SEG NFR BLD AUTO: 62 % (ref 43–75)
NONHDLC SERPL-MCNC: 93 MG/DL
NRBC BLD AUTO-RTO: 0 /100 WBCS
NT-PROBNP SERPL-MCNC: 240 PG/ML
PLATELET # BLD AUTO: 356 THOUSANDS/UL (ref 149–390)
PMV BLD AUTO: 9.2 FL (ref 8.9–12.7)
POTASSIUM SERPL-SCNC: 4.4 MMOL/L (ref 3.5–5.3)
PROT SERPL-MCNC: 7.6 G/DL (ref 6.4–8.2)
PSA SERPL-MCNC: 2.5 NG/ML (ref 0–4)
RBC # BLD AUTO: 5.39 MILLION/UL (ref 3.88–5.62)
SODIUM SERPL-SCNC: 139 MMOL/L (ref 136–145)
TRIGL SERPL-MCNC: 58 MG/DL
TSH SERPL DL<=0.05 MIU/L-ACNC: 0.96 UIU/ML (ref 0.36–3.74)
WBC # BLD AUTO: 8.3 THOUSAND/UL (ref 4.31–10.16)

## 2021-03-03 PROCEDURE — 83036 HEMOGLOBIN GLYCOSYLATED A1C: CPT

## 2021-03-03 PROCEDURE — 84153 ASSAY OF PSA TOTAL: CPT

## 2021-03-03 PROCEDURE — 80053 COMPREHEN METABOLIC PANEL: CPT

## 2021-03-03 PROCEDURE — 80061 LIPID PANEL: CPT

## 2021-03-03 PROCEDURE — 83721 ASSAY OF BLOOD LIPOPROTEIN: CPT

## 2021-03-03 PROCEDURE — 85025 COMPLETE CBC W/AUTO DIFF WBC: CPT

## 2021-03-03 PROCEDURE — 36415 COLL VENOUS BLD VENIPUNCTURE: CPT

## 2021-03-03 PROCEDURE — 82088 ASSAY OF ALDOSTERONE: CPT

## 2021-03-03 PROCEDURE — 83880 ASSAY OF NATRIURETIC PEPTIDE: CPT

## 2021-03-03 PROCEDURE — 80181 DRUG ASSAY FLECAINIDE: CPT

## 2021-03-03 PROCEDURE — 82306 VITAMIN D 25 HYDROXY: CPT

## 2021-03-03 PROCEDURE — 84244 ASSAY OF RENIN: CPT

## 2021-03-03 PROCEDURE — 86140 C-REACTIVE PROTEIN: CPT

## 2021-03-03 PROCEDURE — 84443 ASSAY THYROID STIM HORMONE: CPT

## 2021-03-03 PROCEDURE — 83735 ASSAY OF MAGNESIUM: CPT

## 2021-03-03 PROCEDURE — 87086 URINE CULTURE/COLONY COUNT: CPT

## 2021-03-04 LAB — BACTERIA UR CULT: NORMAL

## 2021-03-06 LAB — FLECAINIDE SERPL-MCNC: 0.18 UG/ML (ref 0.2–1)

## 2021-03-12 DIAGNOSIS — E78.2 MIXED HYPERLIPIDEMIA: ICD-10-CM

## 2021-03-15 RX ORDER — ATORVASTATIN CALCIUM 40 MG/1
40 TABLET, FILM COATED ORAL DAILY
Qty: 90 TABLET | Refills: 1 | Status: SHIPPED | OUTPATIENT
Start: 2021-03-15 | End: 2021-08-16 | Stop reason: SDUPTHER

## 2021-06-14 DIAGNOSIS — I48.0 PAROXYSMAL ATRIAL FIBRILLATION (HCC): ICD-10-CM

## 2021-08-06 DIAGNOSIS — Z79.899 ENCOUNTER FOR MONITORING FLECAINIDE THERAPY: ICD-10-CM

## 2021-08-06 DIAGNOSIS — Z51.81 ENCOUNTER FOR MONITORING FLECAINIDE THERAPY: ICD-10-CM

## 2021-08-06 DIAGNOSIS — E13.65 OTHER SPECIFIED DIABETES MELLITUS WITH HYPERGLYCEMIA, WITHOUT LONG-TERM CURRENT USE OF INSULIN (HCC): ICD-10-CM

## 2021-08-06 DIAGNOSIS — F51.01 PRIMARY INSOMNIA: ICD-10-CM

## 2021-08-06 DIAGNOSIS — E78.2 MIXED HYPERLIPIDEMIA: ICD-10-CM

## 2021-08-06 DIAGNOSIS — R73.03 PRE-DIABETES: Primary | ICD-10-CM

## 2021-08-06 DIAGNOSIS — F41.9 ANXIETY: ICD-10-CM

## 2021-08-06 DIAGNOSIS — R73.9 HYPERGLYCEMIA: ICD-10-CM

## 2021-08-06 DIAGNOSIS — R97.20 ELEVATED PSA: ICD-10-CM

## 2021-08-06 DIAGNOSIS — E55.9 VITAMIN D DEFICIENCY: ICD-10-CM

## 2021-08-06 RX ORDER — LORAZEPAM 1 MG/1
1 TABLET ORAL 2 TIMES DAILY
Qty: 180 TABLET | Refills: 0 | Status: SHIPPED | OUTPATIENT
Start: 2021-08-06

## 2021-08-06 NOTE — PROGRESS NOTES
Pt requesting lab results  Pt states that he had these labs in March per insurance company they will cover these and anything they don't cover pt will pay

## 2021-08-16 DIAGNOSIS — E78.2 MIXED HYPERLIPIDEMIA: ICD-10-CM

## 2021-08-16 RX ORDER — ATORVASTATIN CALCIUM 40 MG/1
40 TABLET, FILM COATED ORAL DAILY
Qty: 90 TABLET | Refills: 0 | Status: SHIPPED | OUTPATIENT
Start: 2021-08-16 | End: 2021-10-13 | Stop reason: SDUPTHER

## 2021-08-18 ENCOUNTER — APPOINTMENT (OUTPATIENT)
Dept: LAB | Facility: CLINIC | Age: 66
End: 2021-08-18
Payer: MEDICARE

## 2021-08-18 DIAGNOSIS — R73.03 PRE-DIABETES: ICD-10-CM

## 2021-08-18 DIAGNOSIS — F51.01 PRIMARY INSOMNIA: ICD-10-CM

## 2021-08-18 DIAGNOSIS — Z79.899 ENCOUNTER FOR MONITORING FLECAINIDE THERAPY: ICD-10-CM

## 2021-08-18 DIAGNOSIS — Z51.81 ENCOUNTER FOR MONITORING FLECAINIDE THERAPY: ICD-10-CM

## 2021-08-18 DIAGNOSIS — E78.2 MIXED HYPERLIPIDEMIA: ICD-10-CM

## 2021-08-18 DIAGNOSIS — E13.65 OTHER SPECIFIED DIABETES MELLITUS WITH HYPERGLYCEMIA, WITHOUT LONG-TERM CURRENT USE OF INSULIN (HCC): ICD-10-CM

## 2021-08-18 DIAGNOSIS — R97.20 ELEVATED PSA: ICD-10-CM

## 2021-08-18 DIAGNOSIS — R73.9 HYPERGLYCEMIA: ICD-10-CM

## 2021-08-18 DIAGNOSIS — E55.9 VITAMIN D DEFICIENCY: ICD-10-CM

## 2021-08-18 LAB
25(OH)D3 SERPL-MCNC: 72.8 NG/ML (ref 30–100)
ALBUMIN SERPL BCP-MCNC: 3.6 G/DL (ref 3.5–5)
ALP SERPL-CCNC: 82 U/L (ref 46–116)
ALT SERPL W P-5'-P-CCNC: 21 U/L (ref 12–78)
ANION GAP SERPL CALCULATED.3IONS-SCNC: 3 MMOL/L (ref 4–13)
AST SERPL W P-5'-P-CCNC: 5 U/L (ref 5–45)
BASOPHILS # BLD AUTO: 0.08 THOUSANDS/ΜL (ref 0–0.1)
BASOPHILS NFR BLD AUTO: 1 % (ref 0–1)
BILIRUB SERPL-MCNC: 0.99 MG/DL (ref 0.2–1)
BUN SERPL-MCNC: 15 MG/DL (ref 5–25)
CALCIUM SERPL-MCNC: 8.9 MG/DL (ref 8.3–10.1)
CHLORIDE SERPL-SCNC: 108 MMOL/L (ref 100–108)
CHOLEST SERPL-MCNC: 90 MG/DL (ref 50–200)
CO2 SERPL-SCNC: 27 MMOL/L (ref 21–32)
CREAT SERPL-MCNC: 0.62 MG/DL (ref 0.6–1.3)
CRP SERPL QL: 16.2 MG/L
EOSINOPHIL # BLD AUTO: 0.2 THOUSAND/ΜL (ref 0–0.61)
EOSINOPHIL NFR BLD AUTO: 2 % (ref 0–6)
ERYTHROCYTE [DISTWIDTH] IN BLOOD BY AUTOMATED COUNT: 13.1 % (ref 11.6–15.1)
EST. AVERAGE GLUCOSE BLD GHB EST-MCNC: 120 MG/DL
GFR SERPL CREATININE-BSD FRML MDRD: 104 ML/MIN/1.73SQ M
GLUCOSE P FAST SERPL-MCNC: 93 MG/DL (ref 65–99)
HBA1C MFR BLD: 5.8 %
HCT VFR BLD AUTO: 47.2 % (ref 36.5–49.3)
HDLC SERPL-MCNC: 26 MG/DL
HGB BLD-MCNC: 15.4 G/DL (ref 12–17)
IMM GRANULOCYTES # BLD AUTO: 0.04 THOUSAND/UL (ref 0–0.2)
IMM GRANULOCYTES NFR BLD AUTO: 1 % (ref 0–2)
LDLC SERPL CALC-MCNC: 54 MG/DL (ref 0–100)
LDLC SERPL DIRECT ASSAY-MCNC: 57 MG/DL (ref 0–100)
LYMPHOCYTES # BLD AUTO: 1.95 THOUSANDS/ΜL (ref 0.6–4.47)
LYMPHOCYTES NFR BLD AUTO: 22 % (ref 14–44)
MAGNESIUM SERPL-MCNC: 2.4 MG/DL (ref 1.6–2.6)
MCH RBC QN AUTO: 30.6 PG (ref 26.8–34.3)
MCHC RBC AUTO-ENTMCNC: 32.6 G/DL (ref 31.4–37.4)
MCV RBC AUTO: 94 FL (ref 82–98)
MONOCYTES # BLD AUTO: 0.77 THOUSAND/ΜL (ref 0.17–1.22)
MONOCYTES NFR BLD AUTO: 9 % (ref 4–12)
NEUTROPHILS # BLD AUTO: 5.76 THOUSANDS/ΜL (ref 1.85–7.62)
NEUTS SEG NFR BLD AUTO: 65 % (ref 43–75)
NONHDLC SERPL-MCNC: 64 MG/DL
NRBC BLD AUTO-RTO: 0 /100 WBCS
NT-PROBNP SERPL-MCNC: 281 PG/ML
PLATELET # BLD AUTO: 319 THOUSANDS/UL (ref 149–390)
PMV BLD AUTO: 9.8 FL (ref 8.9–12.7)
POTASSIUM SERPL-SCNC: 4.3 MMOL/L (ref 3.5–5.3)
PROT SERPL-MCNC: 7.3 G/DL (ref 6.4–8.2)
PSA SERPL-MCNC: 2.9 NG/ML (ref 0–4)
RBC # BLD AUTO: 5.04 MILLION/UL (ref 3.88–5.62)
SODIUM SERPL-SCNC: 138 MMOL/L (ref 136–145)
TRIGL SERPL-MCNC: 52 MG/DL
TSH SERPL DL<=0.05 MIU/L-ACNC: 0.72 UIU/ML (ref 0.36–3.74)
WBC # BLD AUTO: 8.8 THOUSAND/UL (ref 4.31–10.16)

## 2021-08-18 PROCEDURE — 80181 DRUG ASSAY FLECAINIDE: CPT

## 2021-08-18 PROCEDURE — 86140 C-REACTIVE PROTEIN: CPT

## 2021-08-18 PROCEDURE — 80061 LIPID PANEL: CPT

## 2021-08-18 PROCEDURE — 83036 HEMOGLOBIN GLYCOSYLATED A1C: CPT

## 2021-08-18 PROCEDURE — 84153 ASSAY OF PSA TOTAL: CPT

## 2021-08-18 PROCEDURE — 83880 ASSAY OF NATRIURETIC PEPTIDE: CPT

## 2021-08-18 PROCEDURE — 36415 COLL VENOUS BLD VENIPUNCTURE: CPT

## 2021-08-18 PROCEDURE — 84443 ASSAY THYROID STIM HORMONE: CPT

## 2021-08-18 PROCEDURE — 80053 COMPREHEN METABOLIC PANEL: CPT

## 2021-08-18 PROCEDURE — 82306 VITAMIN D 25 HYDROXY: CPT

## 2021-08-18 PROCEDURE — 83721 ASSAY OF BLOOD LIPOPROTEIN: CPT

## 2021-08-18 PROCEDURE — 83735 ASSAY OF MAGNESIUM: CPT

## 2021-08-18 PROCEDURE — 85025 COMPLETE CBC W/AUTO DIFF WBC: CPT

## 2021-08-19 ENCOUNTER — TELEPHONE (OUTPATIENT)
Dept: FAMILY MEDICINE CLINIC | Facility: CLINIC | Age: 66
End: 2021-08-19

## 2021-08-19 NOTE — TELEPHONE ENCOUNTER
Patient's labs showed borderline sugar elevated inflammatory markers that are nondescript he can discuss more fully at next follow-up visit

## 2021-08-23 LAB — FLECAINIDE SERPL-MCNC: 0.18 UG/ML (ref 0.2–1)

## 2021-08-26 ENCOUNTER — OFFICE VISIT (OUTPATIENT)
Dept: FAMILY MEDICINE CLINIC | Facility: CLINIC | Age: 66
End: 2021-08-26
Payer: MEDICARE

## 2021-08-26 VITALS
RESPIRATION RATE: 18 BRPM | DIASTOLIC BLOOD PRESSURE: 70 MMHG | HEART RATE: 64 BPM | WEIGHT: 222 LBS | TEMPERATURE: 96 F | SYSTOLIC BLOOD PRESSURE: 120 MMHG | OXYGEN SATURATION: 99 % | HEIGHT: 73 IN | BODY MASS INDEX: 29.42 KG/M2

## 2021-08-26 DIAGNOSIS — Z72.0 TOBACCO ABUSE: Primary | ICD-10-CM

## 2021-08-26 DIAGNOSIS — R79.82 CRP ELEVATED: ICD-10-CM

## 2021-08-26 DIAGNOSIS — E27.8 ADRENAL NODULE (HCC): ICD-10-CM

## 2021-08-26 DIAGNOSIS — Z79.01 LONG TERM CURRENT USE OF ANTICOAGULANT THERAPY: ICD-10-CM

## 2021-08-26 DIAGNOSIS — I48.0 PAROXYSMAL ATRIAL FIBRILLATION (HCC): ICD-10-CM

## 2021-08-26 DIAGNOSIS — Z23 IMMUNIZATION DUE: ICD-10-CM

## 2021-08-26 DIAGNOSIS — Z86.19 H/O COLD SORES: Primary | ICD-10-CM

## 2021-08-26 DIAGNOSIS — E78.2 MIXED HYPERLIPIDEMIA: ICD-10-CM

## 2021-08-26 DIAGNOSIS — Z87.891 PERSONAL HISTORY OF NICOTINE DEPENDENCE: ICD-10-CM

## 2021-08-26 DIAGNOSIS — E13.65 OTHER SPECIFIED DIABETES MELLITUS WITH HYPERGLYCEMIA, WITHOUT LONG-TERM CURRENT USE OF INSULIN (HCC): ICD-10-CM

## 2021-08-26 PROBLEM — E84.8: Status: RESOLVED | Noted: 2020-10-16 | Resolved: 2021-08-26

## 2021-08-26 PROBLEM — R19.5 OCCULT BLOOD POSITIVE STOOL: Status: RESOLVED | Noted: 2020-10-15 | Resolved: 2021-08-26

## 2021-08-26 PROBLEM — I10 ESSENTIAL HYPERTENSION: Status: ACTIVE | Noted: 2021-08-26

## 2021-08-26 PROCEDURE — 90732 PPSV23 VACC 2 YRS+ SUBQ/IM: CPT

## 2021-08-26 PROCEDURE — 96372 THER/PROPH/DIAG INJ SC/IM: CPT

## 2021-08-26 PROCEDURE — 99214 OFFICE O/P EST MOD 30 MIN: CPT | Performed by: INTERNAL MEDICINE

## 2021-08-26 RX ORDER — ACYCLOVIR 800 MG/1
800 TABLET ORAL 2 TIMES DAILY
Qty: 180 TABLET | Refills: 1 | Status: SHIPPED | OUTPATIENT
Start: 2021-08-26 | End: 2021-11-24

## 2021-08-26 NOTE — PROGRESS NOTES
Assessment/This 69-year-old malePatient has an elevated CRP patient has an el of 16evated CRP of 16 with normal range of 1-3  H he has had no othere has had no other systemic symptoms systemic symptoms  Duy Garrido does have history of tobacco baker abuses have history of tobacco abuse and is agreeable to have and is agreeable to have a CT if CT isabel lung screenng   He will also he will also have repeat CRP sed rate have repeat CRP sed rate and and other collagen vascular other collagen vascular studies in addition a serum protein a serum protein electrophoresis and urine protein electrophoresis  And urine protein electrophoresis  Diet reviewed  Lifestyle modifications reviewed  Medications reviewed and ordered  Laboratory tests and studies reviewed and ordered  All patient's questions answered to patient satisfaction  Chief Complaint   Patient presents with   [facility] [place] [facility]         Diagnoses and all orders for this visit:    Tobacco abuse  -     CT lung screening program; Future    CRP elevated  -     C-reactive protein; Future  -     CASSIDY Screen w/ Reflex to Titer/Pattern; Future  -     Cyclic citrul peptide antibody, IgG; Future  -     RF Screen w/ Reflex to Titer; Future  -     Sedimentation rate, automated; Future  -     UA (URINE) with reflex to Scope  -     Protein electrophoresis, serum; Future  -     Protein electrophoresis, urine  -     CT lung screening program; Future    Other specified diabetes mellitus with hyperglycemia, without long-term current use of insulin (HCC)    Paroxysmal atrial fibrillation (HCC)    Adrenal nodule (HCC)    Personal history of nicotine dependence   -     CT lung screening program; Future    Immunization due  -     PNEUMOCOCCAL POLYSACCHARIDE VACCINE 23-VALENT =>1YO SQ IM    Long term current use of anticoagulant therapy    Mixed hyperlipidemia    Other orders  -     ZINC PICOLINATE PO        Subjective:      This 69-year-old malePatient has an elevated CRP patient has an el of 16evated CRP of 16 with normal range of 1-3  H he has had no othere has had no other systemic symptoms systemic symptoms  Fouzia FloresHe does have history of tobacco baker abuses have history of tobacco abuse and is agreeable to have and is agreeable to have a CT if CT isabel lung screenng   He will also he will also have repeat CRP sed rate have repeat CRP sed rate and and other collagen vascular other collagen vascular studies in addition a serum protein a serum protein electrophoresis and urine protein electrophoresis  And urine protein electrophoresis  Patient ID: Trang Beasley is a 72 y o  male          Current Outpatient Medications:     amLODIPine (NORVASC) 5 mg tablet, Take 1 tablet (5 mg total) by mouth 2 (two) times a day, Disp: 180 tablet, Rfl: 4    apixaban (Eliquis) 5 mg, Take 1 tablet (5 mg total) by mouth 2 (two) times a day, Disp: 180 tablet, Rfl: 4    aspirin 81 mg chewable tablet, Chew 81 mg daily, Disp: , Rfl:     atorvastatin (LIPITOR) 40 mg tablet, Take 1 tablet (40 mg total) by mouth daily, Disp: 90 tablet, Rfl: 0    Cholecalciferol (VITAMIN D3) 1000 units CAPS, 2,000 Units , Disp: , Rfl:     Coenzyme Q10 (CO Q-10) 200 MG CAPS, Take one tablet by mouth Monday,wednesday,friday, Disp: , Rfl:     Cyanocobalamin ER (SV VITAMIN B-12 ER) 1000 MCG TBCR, 1 daily, Disp: , Rfl:     flecainide (TAMBOCOR) 100 mg tablet, Take 1 tablet (100 mg total) by mouth every 12 (twelve) hours, Disp: 180 tablet, Rfl: 3    lisinopril (ZESTRIL) 20 mg tablet, Take 1 tablet (20 mg total) by mouth 2 (two) times a day, Disp: 180 tablet, Rfl: 4    LORazepam (ATIVAN) 1 mg tablet, Take 1 tablet (1 mg total) by mouth 2 (two) times a day, Disp: 180 tablet, Rfl: 0    metoprolol tartrate (LOPRESSOR) 25 mg tablet, Take 1 tablet (25 mg total) by mouth 2 (two) times a day, Disp: 180 tablet, Rfl: 4    Multiple Vitamins-Minerals (MULTIVITAMIN ADULT PO), take 1 by Oral route once, Disp: , Rfl:     selenium 200 mcg, take one tablet daily, Disp: , Rfl:     ZINC PICOLINATE PO, , Disp: , Rfl:     acyclovir (ZOVIRAX) 800 mg tablet, Take 1 tablet (800 mg total) by mouth 2 (two) times a day, Disp: 180 tablet, Rfl: 1    The following portions of the patient's history were reviewed and updated as appropriate: allergies, current medications, past family history, past medical history, past social history, past surgical history and problem list     Review of Systems      Family History   Problem Relation Age of Onset    Heart attack Father 61        MI    Heart attack Paternal Uncle         MI       Past Medical History:   Diagnosis Date    Atrial fibrillation ([facility] [address] ) 2015    Colon polyp     HLD (hyperlipidemia)     HTN (hypertension)     Hypertension     Irregular heart beat     Multiple thyroid nodules     Positive FIT (fecal immunochemical test)     Pre-diabetes     Vitamin D deficiency        Past Surgical History:   Procedure Laterality Date    CARDIOVERSION  2015    COLONOSCOPY      US GUIDED THYROID BIOPSY  10/16/2020       Social History     Socioeconomic History    Marital status: /Civil Union     Spouse name: None    Number of children: None    Years of education: None    Highest education level: None   Occupational History    Occupation: retired   Tobacco Use    Smoking status: Current Every Day Smoker     Packs/day: 0 50     Types: Cigarettes     Last attempt to quit: 2014     Years since quittin 6    Smokeless tobacco: Never Used   Substance and Sexual Activity    Alcohol use: Not Currently    Drug use: Never    Sexual activity: Yes     Partners: Female     Birth control/protection: None   Other Topics Concern    None   Social History Narrative    None     Social Determinants of Health     Financial Resource Strain:     Difficulty of Paying Living Expenses:    Food Insecurity:     Worried About Running Out of Food in the Last Year:     Marry of Food in the Last Year: Transportation Needs:     Lack of Transportation (Medical):  Lack of Transportation (Non-Medical):    Physical Activity:     Days of Exercise per Week:     Minutes of Exercise per Session:    Stress:     Feeling of Stress :    Social Connections:     Frequency of Communication with Friends and Family:     Frequency of Social Gatherings with Friends and Family:     Attends Yazidi Services:     Active Member of Clubs or Organizations:     Attends Club or Organization Meetings:     Marital Status:    Intimate Partner Violence:     Fear of Current or Ex-Partner:     Emotionally Abused:     Physically Abused:     Sexually Abused: Allergies   Allergen Reactions    No Active Allergies             Objective:    /70 (BP Location: Left arm, Patient Position: Sitting, Cuff Size: Adult)   Pulse 64   Temp (!) 96 °F (35 6 °C)   Resp 18   Ht 6' 1" (1 854 m)   Wt 101 kg (222 lb)   SpO2 99%   BMI 29 29 kg/m²        Physical Exam  Answers for HPI/ROS submitted by the patient on 8/25/2021  How would you rate your overall health?: very good  Compared to last year, how is your physical health?: same  In general, how satisfied are you with your life?: very satisfied  Compared to last year, how is your eyesight?: same  Compared to last year, how is your hearing?: same  Compared to last year, how is your emotional/mental health?: same  How often is anger a problem for you?: never, rarely  How often do you feel unusually tired/fatigued?: sometimes  In the past 7 days, how much pain have you experienced?: none  In the past 6 months, have you lost or gained 10 pounds without trying?: No  One or more falls in the last year: No  Do you have trouble with the stairs inside or outside your home?: No  Does your home have working smoke alarms?: Yes  Does your home have a carbon monoxide monitor?: No  Which safety hazards (if any) have you experienced in your home?  Please select all that apply : none  How would you describe your current diet?  Please select all that apply : Regular  In addition to prescription medications, are you taking any over-the-counter supplements?: Yes  If yes, what supplements are you taking?: MVI, Vit D3,Selenium, Zinc,CoQ10,B12,  Can you manage your medications?: Yes  Are you currently taking any opioid medications?: No  Can you walk and transfer into and out of your bed and chair?: Yes  Can you dress and groom yourself?: Yes  Can you bathe or shower yourself?: Yes  Can you feed yourself?: Yes  Can you do your laundry/ housekeeping?: Yes  Can you manage your money, pay your bills, and track your expenses?: Yes  Can you make your own meals?: Yes  Can you do your own shopping?: Yes  Within the last 12 months, have you had any hospitalizations or Emergency Department visits?: No  Do you have a living will?: Yes  Do you have a Durable POA (Power of ) for healthcare decisions?: Yes  Do you have an Advanced Directive for end of life decisions?: Yes  How often have you used an illegal drug (including marijuana) or a prescription medication for non-medical reasons in the past year?: never  What is the typical number of drinks you consume in a day?: 0  What is the typical number of drinks you consume in a week?: 0  How often did you have a drink containing alcohol in the past year?: never  How many drinks did you have on a typical day  when you were drinking in the past year?: never  How often did you have 6 or more drinks on one occasion in the past year?: never

## 2021-08-28 ENCOUNTER — HOSPITAL ENCOUNTER (OUTPATIENT)
Dept: CT IMAGING | Facility: HOSPITAL | Age: 66
Discharge: HOME | End: 2021-08-28
Payer: MEDICARE

## 2021-08-28 DIAGNOSIS — Z87.891 PERSONAL HISTORY OF NICOTINE DEPENDENCE: ICD-10-CM

## 2021-08-28 DIAGNOSIS — Z72.0 TOBACCO ABUSE: ICD-10-CM

## 2021-08-28 DIAGNOSIS — R79.82 CRP ELEVATED: ICD-10-CM

## 2021-08-28 PROCEDURE — 71271 CT THORAX LUNG CANCER SCR C-: CPT

## 2021-08-30 ENCOUNTER — TELEPHONE (OUTPATIENT)
Dept: FAMILY MEDICINE CLINIC | Facility: CLINIC | Age: 66
End: 2021-08-30

## 2021-09-03 ENCOUNTER — APPOINTMENT (OUTPATIENT)
Dept: LAB | Facility: CLINIC | Age: 66
End: 2021-09-03
Payer: MEDICARE

## 2021-09-03 DIAGNOSIS — R79.82 CRP ELEVATED: ICD-10-CM

## 2021-09-03 LAB
BACTERIA UR QL AUTO: NORMAL /HPF
BILIRUB UR QL STRIP: NEGATIVE
CLARITY UR: ABNORMAL
COLOR UR: YELLOW
CRP SERPL QL: <3 MG/L
ERYTHROCYTE [SEDIMENTATION RATE] IN BLOOD: 8 MM/HOUR (ref 0–19)
GLUCOSE UR STRIP-MCNC: NEGATIVE MG/DL
HGB UR QL STRIP.AUTO: NEGATIVE
KETONES UR STRIP-MCNC: NEGATIVE MG/DL
LEUKOCYTE ESTERASE UR QL STRIP: ABNORMAL
NITRITE UR QL STRIP: NEGATIVE
NON-SQ EPI CELLS URNS QL MICRO: NORMAL /HPF
PH UR STRIP.AUTO: 7.5 [PH]
PROT UR STRIP-MCNC: NEGATIVE MG/DL
RBC #/AREA URNS AUTO: NORMAL /HPF
SP GR UR STRIP.AUTO: 1.02 (ref 1–1.03)
UROBILINOGEN UR QL STRIP.AUTO: 1 E.U./DL
WBC #/AREA URNS AUTO: NORMAL /HPF

## 2021-09-03 PROCEDURE — 84165 PROTEIN E-PHORESIS SERUM: CPT | Performed by: PATHOLOGY

## 2021-09-03 PROCEDURE — 85652 RBC SED RATE AUTOMATED: CPT

## 2021-09-03 PROCEDURE — 84165 PROTEIN E-PHORESIS SERUM: CPT

## 2021-09-03 PROCEDURE — 84166 PROTEIN E-PHORESIS/URINE/CSF: CPT | Performed by: PATHOLOGY

## 2021-09-03 PROCEDURE — 84166 PROTEIN E-PHORESIS/URINE/CSF: CPT | Performed by: INTERNAL MEDICINE

## 2021-09-03 PROCEDURE — 86140 C-REACTIVE PROTEIN: CPT

## 2021-09-03 PROCEDURE — 81001 URINALYSIS AUTO W/SCOPE: CPT | Performed by: INTERNAL MEDICINE

## 2021-09-03 PROCEDURE — 86038 ANTINUCLEAR ANTIBODIES: CPT

## 2021-09-03 PROCEDURE — 86430 RHEUMATOID FACTOR TEST QUAL: CPT

## 2021-09-03 PROCEDURE — 36415 COLL VENOUS BLD VENIPUNCTURE: CPT

## 2021-09-03 PROCEDURE — 86200 CCP ANTIBODY: CPT

## 2021-09-05 LAB — RHEUMATOID FACT SER QL LA: NEGATIVE

## 2021-09-06 LAB — RYE IGE QN: NEGATIVE

## 2021-09-07 LAB — CCP AB SER IA-ACNC: 1.2

## 2021-09-08 LAB
ALBUMIN SERPL ELPH-MCNC: 4.23 G/DL (ref 3.5–5)
ALBUMIN SERPL ELPH-MCNC: 57.9 % (ref 52–65)
ALBUMIN UR ELPH-MCNC: 100 %
ALPHA1 GLOB MFR UR ELPH: 0 %
ALPHA1 GLOB SERPL ELPH-MCNC: 0.36 G/DL (ref 0.1–0.4)
ALPHA1 GLOB SERPL ELPH-MCNC: 4.9 % (ref 2.5–5)
ALPHA2 GLOB MFR UR ELPH: 0 %
ALPHA2 GLOB SERPL ELPH-MCNC: 0.81 G/DL (ref 0.4–1.2)
ALPHA2 GLOB SERPL ELPH-MCNC: 11.1 % (ref 7–13)
B-GLOBULIN MFR UR ELPH: 0 %
BETA GLOB ABNORMAL SERPL ELPH-MCNC: 0.42 G/DL (ref 0.4–0.8)
BETA1 GLOB SERPL ELPH-MCNC: 5.8 % (ref 5–13)
BETA2 GLOB SERPL ELPH-MCNC: 5.9 % (ref 2–8)
BETA2+GAMMA GLOB SERPL ELPH-MCNC: 0.43 G/DL (ref 0.2–0.5)
GAMMA GLOB ABNORMAL SERPL ELPH-MCNC: 1.05 G/DL (ref 0.5–1.6)
GAMMA GLOB MFR UR ELPH: 0 %
GAMMA GLOB SERPL ELPH-MCNC: 14.4 % (ref 12–22)
IGG/ALB SER: 1.38 {RATIO} (ref 1.1–1.8)
PROT PATTERN SERPL ELPH-IMP: NORMAL
PROT PATTERN UR ELPH-IMP: NORMAL
PROT SERPL-MCNC: 7.3 G/DL (ref 6.4–8.2)
PROT UR-MCNC: 17 MG/DL

## 2021-09-14 ENCOUNTER — TELEPHONE (OUTPATIENT)
Dept: FAMILY MEDICINE CLINIC | Facility: CLINIC | Age: 66
End: 2021-09-14

## 2021-09-14 DIAGNOSIS — R91.8 ABNORMAL CT LUNG SCREENING: Primary | ICD-10-CM

## 2021-09-14 NOTE — PROGRESS NOTES
CT lung screening and smoker shows subpleural densities possibly postinflammatory and mild emphysematous changes and stable adrenal nodule  Short-term follow-up CT without contrast ordered for comparison in 3 months  Discussed in detail with patient

## 2021-10-04 DIAGNOSIS — I48.0 PAROXYSMAL ATRIAL FIBRILLATION (HCC): ICD-10-CM

## 2021-10-05 RX ORDER — FLECAINIDE ACETATE 100 MG/1
100 TABLET ORAL EVERY 12 HOURS
Qty: 180 TABLET | Refills: 3 | Status: SHIPPED | OUTPATIENT
Start: 2021-10-05 | End: 2022-07-15 | Stop reason: SDUPTHER

## 2021-10-07 ENCOUNTER — APPOINTMENT (OUTPATIENT)
Dept: LAB | Facility: CLINIC | Age: 66
End: 2021-10-07
Payer: MEDICARE

## 2021-10-07 ENCOUNTER — DOCUMENTATION (OUTPATIENT)
Dept: NON INVASIVE DIAGNOSTICS | Facility: HOSPITAL | Age: 66
End: 2021-10-07

## 2021-10-07 DIAGNOSIS — E27.8 ABNORMALITY OF CORTISOL-BINDING GLOBULIN (HCC): ICD-10-CM

## 2021-10-07 DIAGNOSIS — I48.0 PAROXYSMAL ATRIAL FIBRILLATION (HCC): ICD-10-CM

## 2021-10-07 PROCEDURE — 84244 ASSAY OF RENIN: CPT

## 2021-10-07 PROCEDURE — 82088 ASSAY OF ALDOSTERONE: CPT

## 2021-10-12 ENCOUNTER — HOSPITAL ENCOUNTER (OUTPATIENT)
Dept: ULTRASOUND IMAGING | Facility: HOSPITAL | Age: 66
Discharge: HOME | End: 2021-10-12
Payer: MEDICARE

## 2021-10-12 DIAGNOSIS — E04.1 THYROID NODULE: ICD-10-CM

## 2021-10-12 PROCEDURE — 76536 US EXAM OF HEAD AND NECK: CPT

## 2021-10-13 DIAGNOSIS — E78.2 MIXED HYPERLIPIDEMIA: ICD-10-CM

## 2021-10-13 DIAGNOSIS — I10 ESSENTIAL HYPERTENSION, BENIGN: ICD-10-CM

## 2021-10-13 DIAGNOSIS — I48.0 PAROXYSMAL ATRIAL FIBRILLATION (HCC): ICD-10-CM

## 2021-10-13 RX ORDER — ATORVASTATIN CALCIUM 40 MG/1
40 TABLET, FILM COATED ORAL DAILY
Qty: 90 TABLET | Refills: 4 | Status: SHIPPED | OUTPATIENT
Start: 2021-10-13

## 2021-10-13 RX ORDER — LISINOPRIL 20 MG/1
20 TABLET ORAL 2 TIMES DAILY
Qty: 180 TABLET | Refills: 4 | Status: SHIPPED | OUTPATIENT
Start: 2021-10-13

## 2021-10-15 LAB
ALDOST SERPL-MCNC: 11 NG/DL (ref 0–30)
ALDOST/RENIN PLAS-RTO: 25.9 {RATIO} (ref 0–30)
RENIN PLAS-CCNC: 0.42 NG/ML/HR (ref 0.17–5.38)

## 2021-11-23 ENCOUNTER — OFFICE VISIT (OUTPATIENT)
Dept: CARDIOLOGY CLINIC | Facility: CLINIC | Age: 66
End: 2021-11-23
Payer: MEDICARE

## 2021-11-23 VITALS
HEIGHT: 73 IN | HEART RATE: 59 BPM | BODY MASS INDEX: 29.82 KG/M2 | SYSTOLIC BLOOD PRESSURE: 112 MMHG | DIASTOLIC BLOOD PRESSURE: 78 MMHG | WEIGHT: 225 LBS

## 2021-11-23 DIAGNOSIS — I48.0 PAROXYSMAL ATRIAL FIBRILLATION (HCC): Primary | ICD-10-CM

## 2021-11-23 DIAGNOSIS — E04.2 MULTIPLE THYROID NODULES: ICD-10-CM

## 2021-11-23 DIAGNOSIS — E78.2 MIXED HYPERLIPIDEMIA: ICD-10-CM

## 2021-11-23 DIAGNOSIS — I10 ESSENTIAL HYPERTENSION: ICD-10-CM

## 2021-11-23 DIAGNOSIS — Z79.01 LONG TERM CURRENT USE OF ANTICOAGULANT THERAPY: ICD-10-CM

## 2021-11-23 PROCEDURE — 99214 OFFICE O/P EST MOD 30 MIN: CPT | Performed by: INTERNAL MEDICINE

## 2021-11-23 PROCEDURE — 93000 ELECTROCARDIOGRAM COMPLETE: CPT | Performed by: INTERNAL MEDICINE

## 2021-11-29 ENCOUNTER — HOSPITAL ENCOUNTER (OUTPATIENT)
Dept: CT IMAGING | Facility: HOSPITAL | Age: 66
Discharge: HOME | End: 2021-11-29
Payer: MEDICARE

## 2021-11-29 DIAGNOSIS — R91.8 ABNORMAL CT LUNG SCREENING: ICD-10-CM

## 2021-11-29 PROCEDURE — G1004 CDSM NDSC: HCPCS

## 2021-11-29 PROCEDURE — 71250 CT THORAX DX C-: CPT

## 2021-11-30 ENCOUNTER — TELEPHONE (OUTPATIENT)
Dept: CARDIOLOGY CLINIC | Facility: CLINIC | Age: 66
End: 2021-11-30

## 2021-12-15 ENCOUNTER — TELEPHONE (OUTPATIENT)
Dept: FAMILY MEDICINE CLINIC | Facility: CLINIC | Age: 66
End: 2021-12-15

## 2021-12-16 ENCOUNTER — TELEMEDICINE (OUTPATIENT)
Dept: FAMILY MEDICINE CLINIC | Facility: CLINIC | Age: 66
End: 2021-12-16
Payer: MEDICARE

## 2021-12-16 DIAGNOSIS — E27.8 ADRENAL CORTICAL NODULE (HCC): Primary | ICD-10-CM

## 2021-12-16 DIAGNOSIS — Z12.2 ENCOUNTER FOR SCREENING FOR LUNG CANCER: ICD-10-CM

## 2021-12-16 PROCEDURE — 99441 PR PHYS/QHP TELEPHONE EVALUATION 5-10 MIN: CPT | Performed by: INTERNAL MEDICINE

## 2022-02-21 ENCOUNTER — TELEPHONE (OUTPATIENT)
Dept: FAMILY MEDICINE CLINIC | Facility: CLINIC | Age: 67
End: 2022-02-21

## 2022-02-21 DIAGNOSIS — E04.2 MULTIPLE THYROID NODULES: Primary | ICD-10-CM

## 2022-02-21 DIAGNOSIS — I48.0 PAROXYSMAL ATRIAL FIBRILLATION (HCC): ICD-10-CM

## 2022-02-21 DIAGNOSIS — Z79.899 ENCOUNTER FOR MONITORING FLECAINIDE THERAPY: ICD-10-CM

## 2022-02-21 DIAGNOSIS — Z51.81 ENCOUNTER FOR MONITORING FLECAINIDE THERAPY: ICD-10-CM

## 2022-02-21 DIAGNOSIS — N42.9 DISORDER OF PROSTATE, UNSPECIFIED: ICD-10-CM

## 2022-02-21 DIAGNOSIS — R93.1 ABNORMAL SCREENING CT OF HEART: ICD-10-CM

## 2022-02-21 DIAGNOSIS — I10 ESSENTIAL HYPERTENSION, BENIGN: ICD-10-CM

## 2022-02-21 DIAGNOSIS — Z79.01 LONG TERM CURRENT USE OF ANTICOAGULANT THERAPY: ICD-10-CM

## 2022-02-21 DIAGNOSIS — E13.65 OTHER SPECIFIED DIABETES MELLITUS WITH HYPERGLYCEMIA, WITHOUT LONG-TERM CURRENT USE OF INSULIN (HCC): ICD-10-CM

## 2022-02-21 DIAGNOSIS — E55.9 VITAMIN D DEFICIENCY, UNSPECIFIED: ICD-10-CM

## 2022-02-21 DIAGNOSIS — Z12.5 PROSTATE CANCER SCREENING: ICD-10-CM

## 2022-02-21 DIAGNOSIS — E78.2 MIXED HYPERLIPIDEMIA: ICD-10-CM

## 2022-02-21 DIAGNOSIS — I10 ESSENTIAL HYPERTENSION: ICD-10-CM

## 2022-02-21 RX ORDER — AMLODIPINE BESYLATE 5 MG/1
TABLET ORAL
Qty: 180 TABLET | Refills: 3 | Status: SHIPPED | OUTPATIENT
Start: 2022-02-21

## 2022-02-21 NOTE — TELEPHONE ENCOUNTER
Patient states every year prior to his visit he has labs ordered   Would like to know if labs can be placed

## 2022-02-28 ENCOUNTER — APPOINTMENT (OUTPATIENT)
Dept: LAB | Facility: CLINIC | Age: 67
End: 2022-02-28
Payer: MEDICARE

## 2022-02-28 DIAGNOSIS — E55.9 VITAMIN D DEFICIENCY, UNSPECIFIED: ICD-10-CM

## 2022-02-28 DIAGNOSIS — Z79.01 LONG TERM CURRENT USE OF ANTICOAGULANT THERAPY: ICD-10-CM

## 2022-02-28 DIAGNOSIS — E13.65 OTHER SPECIFIED DIABETES MELLITUS WITH HYPERGLYCEMIA, WITHOUT LONG-TERM CURRENT USE OF INSULIN (HCC): ICD-10-CM

## 2022-02-28 DIAGNOSIS — I10 ESSENTIAL HYPERTENSION: ICD-10-CM

## 2022-02-28 DIAGNOSIS — E78.2 MIXED HYPERLIPIDEMIA: ICD-10-CM

## 2022-02-28 DIAGNOSIS — N42.9 DISORDER OF PROSTATE, UNSPECIFIED: ICD-10-CM

## 2022-02-28 DIAGNOSIS — Z79.899 ENCOUNTER FOR MONITORING FLECAINIDE THERAPY: ICD-10-CM

## 2022-02-28 DIAGNOSIS — R93.1 ABNORMAL SCREENING CT OF HEART: ICD-10-CM

## 2022-02-28 DIAGNOSIS — Z51.81 ENCOUNTER FOR MONITORING FLECAINIDE THERAPY: ICD-10-CM

## 2022-02-28 DIAGNOSIS — E04.2 MULTIPLE THYROID NODULES: ICD-10-CM

## 2022-02-28 DIAGNOSIS — I48.0 PAROXYSMAL ATRIAL FIBRILLATION (HCC): ICD-10-CM

## 2022-02-28 DIAGNOSIS — Z12.5 PROSTATE CANCER SCREENING: ICD-10-CM

## 2022-02-28 LAB
ALBUMIN SERPL BCP-MCNC: 4.2 G/DL (ref 3.5–5)
ALP SERPL-CCNC: 74 U/L (ref 46–116)
ALT SERPL W P-5'-P-CCNC: 25 U/L (ref 12–78)
ANION GAP SERPL CALCULATED.3IONS-SCNC: 3 MMOL/L (ref 4–13)
AST SERPL W P-5'-P-CCNC: 10 U/L (ref 5–45)
BASOPHILS # BLD AUTO: 0.07 THOUSANDS/ΜL (ref 0–0.1)
BASOPHILS NFR BLD AUTO: 1 % (ref 0–1)
BILIRUB SERPL-MCNC: 1.05 MG/DL (ref 0.2–1)
BUN SERPL-MCNC: 17 MG/DL (ref 5–25)
CALCIUM SERPL-MCNC: 9.2 MG/DL (ref 8.3–10.1)
CHLORIDE SERPL-SCNC: 108 MMOL/L (ref 100–108)
CHOLEST SERPL-MCNC: 115 MG/DL
CO2 SERPL-SCNC: 29 MMOL/L (ref 21–32)
CREAT SERPL-MCNC: 0.79 MG/DL (ref 0.6–1.3)
CRP SERPL QL: <3 MG/L
EOSINOPHIL # BLD AUTO: 0.24 THOUSAND/ΜL (ref 0–0.61)
EOSINOPHIL NFR BLD AUTO: 3 % (ref 0–6)
ERYTHROCYTE [DISTWIDTH] IN BLOOD BY AUTOMATED COUNT: 13.3 % (ref 11.6–15.1)
GFR SERPL CREATININE-BSD FRML MDRD: 93 ML/MIN/1.73SQ M
GLUCOSE P FAST SERPL-MCNC: 100 MG/DL (ref 65–99)
HCT VFR BLD AUTO: 50.4 % (ref 36.5–49.3)
HDLC SERPL-MCNC: 30 MG/DL
HGB BLD-MCNC: 16.3 G/DL (ref 12–17)
IMM GRANULOCYTES # BLD AUTO: 0.03 THOUSAND/UL (ref 0–0.2)
IMM GRANULOCYTES NFR BLD AUTO: 0 % (ref 0–2)
LDLC SERPL CALC-MCNC: 73 MG/DL (ref 0–100)
LDLC SERPL DIRECT ASSAY-MCNC: 70 MG/DL (ref 0–100)
LYMPHOCYTES # BLD AUTO: 2.13 THOUSANDS/ΜL (ref 0.6–4.47)
LYMPHOCYTES NFR BLD AUTO: 23 % (ref 14–44)
MAGNESIUM SERPL-MCNC: 2.3 MG/DL (ref 1.6–2.6)
MCH RBC QN AUTO: 30.7 PG (ref 26.8–34.3)
MCHC RBC AUTO-ENTMCNC: 32.3 G/DL (ref 31.4–37.4)
MCV RBC AUTO: 95 FL (ref 82–98)
MONOCYTES # BLD AUTO: 0.74 THOUSAND/ΜL (ref 0.17–1.22)
MONOCYTES NFR BLD AUTO: 8 % (ref 4–12)
NEUTROPHILS # BLD AUTO: 6.16 THOUSANDS/ΜL (ref 1.85–7.62)
NEUTS SEG NFR BLD AUTO: 65 % (ref 43–75)
NONHDLC SERPL-MCNC: 85 MG/DL
NRBC BLD AUTO-RTO: 0 /100 WBCS
NT-PROBNP SERPL-MCNC: 168 PG/ML
PLATELET # BLD AUTO: 358 THOUSANDS/UL (ref 149–390)
PMV BLD AUTO: 9.7 FL (ref 8.9–12.7)
POTASSIUM SERPL-SCNC: 4.5 MMOL/L (ref 3.5–5.3)
PROT SERPL-MCNC: 8 G/DL (ref 6.4–8.2)
PSA SERPL-MCNC: 3.1 NG/ML (ref 0–4)
RBC # BLD AUTO: 5.31 MILLION/UL (ref 3.88–5.62)
SODIUM SERPL-SCNC: 140 MMOL/L (ref 136–145)
TRIGL SERPL-MCNC: 62 MG/DL
TSH SERPL DL<=0.05 MIU/L-ACNC: 1.03 UIU/ML (ref 0.36–3.74)
WBC # BLD AUTO: 9.37 THOUSAND/UL (ref 4.31–10.16)

## 2022-02-28 PROCEDURE — 82306 VITAMIN D 25 HYDROXY: CPT

## 2022-02-28 PROCEDURE — 80061 LIPID PANEL: CPT

## 2022-02-28 PROCEDURE — 84153 ASSAY OF PSA TOTAL: CPT

## 2022-02-28 PROCEDURE — 83721 ASSAY OF BLOOD LIPOPROTEIN: CPT

## 2022-02-28 PROCEDURE — 36415 COLL VENOUS BLD VENIPUNCTURE: CPT

## 2022-02-28 PROCEDURE — 83036 HEMOGLOBIN GLYCOSYLATED A1C: CPT

## 2022-02-28 PROCEDURE — 80181 DRUG ASSAY FLECAINIDE: CPT

## 2022-02-28 PROCEDURE — 80053 COMPREHEN METABOLIC PANEL: CPT

## 2022-02-28 PROCEDURE — 86140 C-REACTIVE PROTEIN: CPT

## 2022-02-28 PROCEDURE — 83880 ASSAY OF NATRIURETIC PEPTIDE: CPT

## 2022-02-28 PROCEDURE — 84443 ASSAY THYROID STIM HORMONE: CPT

## 2022-02-28 PROCEDURE — 83735 ASSAY OF MAGNESIUM: CPT

## 2022-02-28 PROCEDURE — 85025 COMPLETE CBC W/AUTO DIFF WBC: CPT

## 2022-03-01 LAB
25(OH)D3 SERPL-MCNC: 62 NG/ML (ref 30–100)
EST. AVERAGE GLUCOSE BLD GHB EST-MCNC: 117 MG/DL
HBA1C MFR BLD: 5.7 %

## 2022-03-03 LAB — FLECAINIDE SERPL-MCNC: 0.14 UG/ML (ref 0.2–1)

## 2022-04-11 ENCOUNTER — TELEPHONE (OUTPATIENT)
Dept: CARDIOLOGY CLINIC | Facility: CLINIC | Age: 67
End: 2022-04-11

## 2022-04-11 NOTE — TELEPHONE ENCOUNTER
Patient called asking for samples "until mail order comes"  Patient has 2 days worth left  I [facility] and he is not on automatic refills and none was sent out  Placed order for patient but when I called patient he "doesn't want order placed, has now decided to use retail "  Has one refill left at local pharmacy  Patient should not need samples if local pharmacy is used  I called and cancelled mail order

## 2022-04-12 ENCOUNTER — TELEPHONE (OUTPATIENT)
Dept: CARDIOLOGY CLINIC | Facility: CLINIC | Age: 67
End: 2022-04-12

## 2022-04-12 NOTE — TELEPHONE ENCOUNTER
Second phone call placed to our office on 4/12/22  Patient angry that I placed his order for mail order and he was charged on his credit card  He "doesn't want mail order, however, he called to say his mail order wasn't coming in time, initially " Patient states he received two charges on his credit card, which, he admits "he made a mistake on the first charge, by ordering himself,  and deciding he didn't want mail order "  I was just trying to help the patient get his order in a timely manner, as his initial call was complaining mail order was not coming "  Patient asked for  name, which I gave as Nilda Call  Patient states he is switching to [facility] for his medical care, now

## 2022-07-14 DIAGNOSIS — I48.0 PAROXYSMAL ATRIAL FIBRILLATION (HCC): ICD-10-CM

## 2022-07-15 RX ORDER — FLECAINIDE ACETATE 100 MG/1
TABLET ORAL
Qty: 180 TABLET | Refills: 3 | Status: SHIPPED | OUTPATIENT
Start: 2022-07-15

## 2022-07-18 ENCOUNTER — TELEPHONE (OUTPATIENT)
Dept: FAMILY MEDICINE CLINIC | Facility: CLINIC | Age: 67
End: 2022-07-18

## 2022-07-18 DIAGNOSIS — E78.2 MIXED HYPERLIPIDEMIA: ICD-10-CM

## 2022-07-18 DIAGNOSIS — Z51.81 ENCOUNTER FOR MONITORING FLECAINIDE THERAPY: ICD-10-CM

## 2022-07-18 DIAGNOSIS — R79.82 CRP ELEVATED: ICD-10-CM

## 2022-07-18 DIAGNOSIS — E55.9 VITAMIN D DEFICIENCY, UNSPECIFIED: ICD-10-CM

## 2022-07-18 DIAGNOSIS — R73.9 HYPERGLYCEMIA: ICD-10-CM

## 2022-07-18 DIAGNOSIS — E13.65 OTHER SPECIFIED DIABETES MELLITUS WITH HYPERGLYCEMIA, WITHOUT LONG-TERM CURRENT USE OF INSULIN (HCC): ICD-10-CM

## 2022-07-18 DIAGNOSIS — I10 ESSENTIAL HYPERTENSION, BENIGN: ICD-10-CM

## 2022-07-18 DIAGNOSIS — E04.2 MULTIPLE THYROID NODULES: Primary | ICD-10-CM

## 2022-07-18 DIAGNOSIS — Z79.01 LONG TERM CURRENT USE OF ANTICOAGULANT THERAPY: ICD-10-CM

## 2022-07-18 DIAGNOSIS — Z79.899 ENCOUNTER FOR MONITORING FLECAINIDE THERAPY: ICD-10-CM

## 2022-07-18 DIAGNOSIS — Z12.5 PROSTATE CANCER SCREENING: ICD-10-CM

## 2022-07-31 ENCOUNTER — OFFICE VISIT (OUTPATIENT)
Dept: URGENT CARE | Facility: MEDICAL CENTER | Age: 67
End: 2022-07-31
Payer: MEDICARE

## 2022-07-31 VITALS
DIASTOLIC BLOOD PRESSURE: 75 MMHG | SYSTOLIC BLOOD PRESSURE: 138 MMHG | OXYGEN SATURATION: 100 % | TEMPERATURE: 97.5 F | RESPIRATION RATE: 16 BRPM | HEART RATE: 68 BPM

## 2022-07-31 DIAGNOSIS — J30.2 SEASONAL ALLERGIC RHINITIS, UNSPECIFIED TRIGGER: ICD-10-CM

## 2022-07-31 DIAGNOSIS — H60.331 ACUTE SWIMMER'S EAR OF RIGHT SIDE: Primary | ICD-10-CM

## 2022-07-31 PROCEDURE — G0463 HOSPITAL OUTPT CLINIC VISIT: HCPCS | Performed by: EMERGENCY MEDICINE

## 2022-07-31 PROCEDURE — 99213 OFFICE O/P EST LOW 20 MIN: CPT | Performed by: EMERGENCY MEDICINE

## 2022-07-31 RX ORDER — NEOMYCIN SULFATE, POLYMYXIN B SULFATE AND HYDROCORTISONE 10; 3.5; 1 MG/ML; MG/ML; [USP'U]/ML
3 SUSPENSION/ DROPS AURICULAR (OTIC) 4 TIMES DAILY
Qty: 3 ML | Refills: 0 | Status: SHIPPED | OUTPATIENT
Start: 2022-07-31 | End: 2022-08-05

## 2022-07-31 RX ORDER — FLUTICASONE PROPIONATE 50 MCG
1 SPRAY, SUSPENSION (ML) NASAL DAILY
Qty: 9.9 ML | Refills: 0 | Status: SHIPPED | OUTPATIENT
Start: 2022-07-31

## 2022-07-31 NOTE — PROGRESS NOTES
[facility]        NAME: Quiana Torres is a 77 y o  male  : 1955    MRN: [id number]  DATE: 2022  TIME: 8:49 AM    Assessment and Plan   Acute swimmer's ear of right side [H60 331]  1  Acute swimmer's ear of right side  neomycin-polymyxin-hydrocortisone (CORTISPORIN) 0 35%-10,000 units/mL-1% otic suspension   2  Seasonal allergic rhinitis, unspecified trigger  fluticasone (FLONASE) 50 mcg/act nasal spray         Patient Instructions   Swimmer's Ear   WHAT YOU NEED TO KNOW:   Swimmer's ear, also called otitis externa, is an infection in the outer ear canal  This canal goes from the outside of your ear to your eardrum  Swimmer's ear most often occurs when water remains in your ear after you swim  This creates a moist area for bacteria to grow  Scratches or damage from your fingers, cotton swabs, or other objects can also cause swimmer's ear  DISCHARGE INSTRUCTIONS:   Return to the emergency department if:   · You have severe ear pain  · You are suddenly not able to hear  · You have new swelling in your face, behind your ears, or in your neck  · You suddenly cannot move part of your face, or it feels numb  Call your doctor if:   · You have a fever  · Your symptoms get worse or do not go away, even after treatment  · You have questions or concerns about your condition or care  Treatment for swimmer's ear  may include cleaning your outer ear canal first  This will help clean any ear wax, flaky skin, or other discharge  You may then need any of the followin  Medicines:      ? Ear drops  help fight infection and decrease redness and swelling  ? Acetaminophen  decreases pain and fever  It is available without a doctor's order  Ask how much to take and how often to take it  Follow directions   Read the labels of all other medicines you are using to see if they also contain acetaminophen, or ask your doctor or pharmacist  Acetaminophen can cause liver damage if not taken correctly  Do not use more than 4 grams (4,000 milligrams) total of acetaminophen in one day  ? NSAIDs , such as ibuprofen, help decrease swelling, pain, and fever  This medicine is available with or without a doctor's order  NSAIDs can cause stomach bleeding or kidney problems in certain people  If you take blood thinner medicine, always ask your healthcare provider if NSAIDs are safe for you  Always read the medicine label and follow directions  2  An ear wick  may be used if your ear canal is blocked  A wick (small tube) made of cotton or gauze is placed in your ear  The wick helps pull extra fluid out of your ear canal  Donna also help draw medicine into your ear canal     How to use ear drops:   · Warm the bottle of ear drops in your hands  for a few minutes  · Lie down on your side with your infected ear facing up  This will help the medicine travel completely through your ear canal     · Gently pull the ear up and back  Carefully drip the correct number of ear drops into your ear  Have another person help you if possible  · For children younger than 3 years,  gently pull and hold the ear down and back  · For children older than 3 years,  gently pull and hold the ear up and back  · Stay in the same position  for 3 to 5 minutes to let the medicine soak in  Prevent swimmer's ear:   · Dry your ears completely after you swim or bathe  Gently wipe your outer ear with a soft towel or cloth  Use ear plugs when you swim  · Do not put cotton swabs or other objects in your ears  These can scratch or damage your ear  They can also push ear wax deeper in and irritate your ear  · Put cotton balls gently in your outer ear  when you apply hair spray, hair dye, or perfume  Follow up with your doctor as directed:  Write down your questions so you remember to ask them during your visits    © Copyright [employer] 2022 Information is for End User's use only and may not be sold, redistributed or otherwise used for commercial purposes  All illustrations and images included in CareNotes® are the copyrighted property of A D A M [employer] Inc  or Emerita Richardson  The above information is an  only  It is not intended as medical advice for individual conditions or treatments  Talk to your doctor, nurse or pharmacist before following any medical regimen to see if it is safe and effective for you  Allergic Rhinitis   WHAT YOU NEED TO KNOW:   Allergic rhinitis, or hay fever, is swelling of the inside of your nose  The swelling is a reaction to allergens in the air  An allergen can be anything that causes an allergic reaction  Allergies to weeds, grass, trees, or mold often cause seasonal allergic rhinitis  Indoor dust mites, cockroaches, pet dander, or mold can also cause allergic rhinitis  DISCHARGE INSTRUCTIONS:   Call 911 for the following:   · You have chest pain or shortness of breath  Return to the emergency department if:   · You have severe pain  · You cough up blood  Contact your healthcare provider if:   · You have a fever  · You have ear or sinus pain, or a headache  · Your symptoms get worse, even after treatment  · You have yellow, green, brown, or bloody mucus coming from your nose  · Your nose is bleeding or you have pain inside your nose  · You have trouble sleeping because of your symptoms  · You have questions or concerns about your condition or care  Medicines:   · Medicines  help decrease your symptoms and clear your stuffy nose  · Take your medicine as directed  Contact your healthcare provider if you think your medicine is not helping or if you have side effects  Tell him of her if you are allergic to any medicine  Keep a list of the medicines, vitamins, and herbs you take  Include the amounts, and when and why you take them  Bring the list or the pill bottles to follow-up visits   Carry your medicine list with you in case of an emergency  How to manage allergic rhinitis:  The best way to manage allergic rhinitis is to avoid allergens that can trigger your symptoms  Any of the following may help decrease your symptoms:  · Rinse your nose and sinuses  with a salt water solution or use a salt water nasal spray  This will help thin the mucus in your nose and rinse away pollen and dirt  It will also help reduce swelling so you can breathe normally  Ask your healthcare provider how often to rinse your nose  · Reduce exposure to dust mites  Wash sheets and towels in hot water every week  Cover your pillows and mattresses with allergen-free covers  Limit the number of stuffed animals and soft toys your child has  Wash your child's toys in hot water regularly  Vacuum weekly and use a vacuum  with an air filter  If possible, get rid of carpets and curtains  These collect dust and dust mites  · Reduce exposure to pollen  Keep windows and doors closed in your house and car  Stay inside when air pollution or the pollen count is high  Run your air conditioner on recycle, and change air filters often  Shower and wash your hair before bed every night to rinse away pollen  · Reduce exposure to pet dander  If possible, do not keep cats, dogs, birds, or other pets  If you do keep pets in your home, keep them out of bedrooms and carpeted rooms  Bathe them often  · Reduce exposure to mold  Do not spend time in basements  Choose artificial plants instead of live plants  Keep your home's humidity at less than 45%  Do not have ponds or standing water in your home or yard  · Do not smoke  Avoid others who smoke  Ask your healthcare provider for information if you currently smoke and need help to quit  Follow up with your healthcare provider as directed: You may need to see an allergist often to control your symptoms  Write down your questions so you remember to ask them during your visits    © Copyright [employer] 2022 Information is for [employer] use only and may not be sold, redistributed or otherwise used for commercial purposes  All illustrations and images included in CareNotes® are the copyrighted property of [employer] M [employer] Inc  or Emerita Richardson  The above information is an  only  It is not intended as medical advice for individual conditions or treatments  Talk to your doctor, nurse or pharmacist before following any medical regimen to see if it is safe and effective for you  Follow up with PCP in 3-5 days  Proceed to  ER if symptoms worsen  Chief Complaint     Chief Complaint   Patient presents with    Earache     Right ear pain x last night  Pain with swallowing  History of Present Illness       59-year-old male presents today reporting right ear pain which started last night  He also reports nasal congestion and post nasal drip  NO fever  Review of Systems   Review of Systems   Constitutional: Negative for chills, fatigue and fever  HENT: Positive for congestion, ear pain, postnasal drip and sinus pressure  Negative for sore throat and trouble swallowing  Respiratory: Negative for chest tightness and shortness of breath  Gastrointestinal: Negative for abdominal pain  Genitourinary: Negative for dysuria  Skin: Negative for rash  Allergic/Immunologic: Negative for immunocompromised state  Neurological: Negative for dizziness, light-headedness and headaches           Current Medications       Current Outpatient Medications:     amLODIPine (NORVASC) 5 mg tablet, TAKE 1 TABLET TWICE A DAY, Disp: 180 tablet, Rfl: 3    apixaban (Eliquis) 5 mg, Take 1 tablet (5 mg total) by mouth 2 (two) times a day, Disp: 180 tablet, Rfl: 4    atorvastatin (LIPITOR) 40 mg tablet, Take 1 tablet (40 mg total) by mouth daily, Disp: 90 tablet, Rfl: 4    Cholecalciferol (VITAMIN D3) 1000 units CAPS, 5,000 Units  , Disp: , Rfl:     Coenzyme Q10 (CO Q-10) 200 MG CAPS, Patient states that he takes this on a daily basis   , Disp: , Rfl:     Cyanocobalamin ER 1000 MCG TBCR, 1 daily, Disp: , Rfl:     flecainide (TAMBOCOR) 100 mg tablet, TAKE 1 TABLET EVERY 12 HOURS, Disp: 180 tablet, Rfl: 3    fluticasone (FLONASE) 50 mcg/act nasal spray, 1 spray into each nostril daily, Disp: 9 9 mL, Rfl: 0    lisinopril (ZESTRIL) 20 mg tablet, Take 1 tablet (20 mg total) by mouth 2 (two) times a day, Disp: 180 tablet, Rfl: 4    LORazepam (ATIVAN) 1 mg tablet, Take 1 tablet (1 mg total) by mouth 2 (two) times a day (Patient taking differently: Take 1 mg by mouth 2 (two) times a day PRN), Disp: 180 tablet, Rfl: 0    metoprolol tartrate (LOPRESSOR) 25 mg tablet, TAKE 1 TABLET TWICE A DAY, Disp: 180 tablet, Rfl: 3    Multiple Vitamins-Minerals (MULTIVITAMIN ADULT PO), take 1 by Oral route once, Disp: , Rfl:     neomycin-polymyxin-hydrocortisone (CORTISPORIN) 0 35%-10,000 units/mL-1% otic suspension, Administer 3 drops to the right ear 4 (four) times a day for 5 days, Disp: 3 mL, Rfl: 0    selenium 200 mcg, take one tablet daily, Disp: , Rfl:     ZINC PICOLINATE PO, Take 22 mg by mouth daily  , Disp: , Rfl:     acyclovir (ZOVIRAX) 800 mg tablet, Take 1 tablet (800 mg total) by mouth 2 (two) times a day, Disp: 180 tablet, Rfl: 1    Current Allergies     Allergies as of 07/31/2022 - Reviewed 07/31/2022   Allergen Reaction Noted    No active allergies  04/27/2018            The following portions of the patient's history were reviewed and updated as appropriate: allergies, current medications, past family history, past medical history, past social history, past surgical history and problem list      Past Medical History:   Diagnosis Date    Atrial fibrillation ([facility] [address] ) 06/26/2015    Colon polyp     HLD (hyperlipidemia)     HTN (hypertension)     Hypertension     Irregular heart beat     Multiple thyroid nodules     Positive FIT (fecal immunochemical test)     Pre-diabetes     Vitamin D deficiency        Past Surgical History:   Procedure Laterality Date    CARDIOVERSION  06/28/2015    COLONOSCOPY      US GUIDED THYROID BIOPSY  10/16/2020       Family History   Problem Relation Age of Onset    Heart attack Father 61        MI    Heart attack Paternal Uncle         MI         Medications have been verified  Objective   /75   Pulse 68   Temp 97 5 °F (36 4 °C)   Resp 16   SpO2 100%        Physical Exam     Physical Exam  Vitals and nursing note reviewed  Constitutional:       Appearance: Normal appearance  HENT:      Head: Normocephalic and atraumatic  Left Ear: Tympanic membrane, ear canal and external ear normal       Ears:      Comments: Mild edema right EAC  No redness  TM intact  No erythema or bulging noted  Nose: Rhinorrhea present  Mouth/Throat:      Comments: Moderate, clear post nasal drip  NO erythema  TMJ non tender bilaterally  Eyes:      Extraocular Movements: Extraocular movements intact  Pupils: Pupils are equal, round, and reactive to light  Musculoskeletal:      Cervical back: No rigidity  Lymphadenopathy:      Cervical: No cervical adenopathy  Skin:     General: Skin is warm and dry  Capillary Refill: Capillary refill takes less than 2 seconds  Neurological:      General: No focal deficit present  Mental Status: He is alert and oriented to person, place, and time     Psychiatric:         Mood and Affect: Mood normal          Behavior: Behavior normal

## 2022-07-31 NOTE — PATIENT INSTRUCTIONS
Swimmer's Ear   WHAT YOU NEED TO KNOW:   Swimmer's ear, also called otitis externa, is an infection in the outer ear canal  This canal goes from the outside of your ear to your eardrum  Swimmer's ear most often occurs when water remains in your ear after you swim  This creates a moist area for bacteria to grow  Scratches or damage from your fingers, cotton swabs, or other objects can also cause swimmer's ear  DISCHARGE INSTRUCTIONS:   Return to the emergency department if:   You have severe ear pain  You are suddenly not able to hear  You have new swelling in your face, behind your ears, or in your neck  You suddenly cannot move part of your face, or it feels numb  Call your doctor if:   You have a fever  Your symptoms get worse or do not go away, even after treatment  You have questions or concerns about your condition or care  Treatment for swimmer's ear  may include cleaning your outer ear canal first  This will help clean any ear wax, flaky skin, or other discharge  You may then need any of the following:  Medicines:      Ear drops  help fight infection and decrease redness and swelling  Acetaminophen  decreases pain and fever  It is available without a doctor's order  Ask how much to take and how often to take it  Follow directions  Read the labels of all other medicines you are using to see if they also contain acetaminophen, or ask your doctor or pharmacist  Acetaminophen can cause liver damage if not taken correctly  Do not use more than 4 grams (4,000 milligrams) total of acetaminophen in one day  NSAIDs , such as ibuprofen, help decrease swelling, pain, and fever  This medicine is available with or without a doctor's order  NSAIDs can cause stomach bleeding or kidney problems in certain people  If you take blood thinner medicine, always ask your healthcare provider if NSAIDs are safe for you  Always read the medicine label and follow directions      An ear wick  may be used if your ear canal is blocked  A wick (small tube) made of cotton or gauze is placed in your ear  The wick helps pull extra fluid out of your ear canal  Donna also help draw medicine into your ear canal     How to use ear drops:   Warm the bottle of ear drops in your hands  for a few minutes  Lie down on your side with your infected ear facing up  This will help the medicine travel completely through your ear canal     Gently pull the ear up and back  Carefully drip the correct number of ear drops into your ear  Have another person help you if possible  For children younger than 3 years,  gently pull and hold the ear down and back  For children older than 3 years,  gently pull and hold the ear up and back  Stay in the same position  for 3 to 5 minutes to let the medicine soak in  Prevent swimmer's ear:   Dry your ears completely after you swim or bathe  Gently wipe your outer ear with a soft towel or cloth  Use ear plugs when you swim  Do not put cotton swabs or other objects in your ears  These can scratch or damage your ear  They can also push ear wax deeper in and irritate your ear  Put cotton balls gently in your outer ear  when you apply hair spray, hair dye, or perfume  Follow up with your doctor as directed:  Write down your questions so you remember to ask them during your visits  © Copyright [employer] 2022 Information is for End User's use only and may not be sold, redistributed or otherwise used for commercial purposes  All illustrations and images included in CareNotes® are the copyrighted property of A D [employer] M , [employer]  or [address] [place], [place] [address]prakash   The above information is an  only  It is not intended as medical advice for individual conditions or treatments  Talk to your doctor, nurse or pharmacist before following any medical regimen to see if it is safe and effective for you    Allergic Rhinitis   WHAT YOU NEED TO KNOW:   Allergic rhinitis, or hay fever, is swelling of the inside of your nose  The swelling is a reaction to allergens in the air  An allergen can be anything that causes an allergic reaction  Allergies to weeds, grass, trees, or mold often cause seasonal allergic rhinitis  Indoor dust mites, cockroaches, pet dander, or mold can also cause allergic rhinitis  DISCHARGE INSTRUCTIONS:   Call 911 for the following: You have chest pain or shortness of breath  Return to the emergency department if:   You have severe pain  You cough up blood  Contact your healthcare provider if:   You have a fever  You have ear or sinus pain, or a headache  Your symptoms get worse, even after treatment  You have yellow, green, brown, or bloody mucus coming from your nose  Your nose is bleeding or you have pain inside your nose  You have trouble sleeping because of your symptoms  You have questions or concerns about your condition or care  Medicines:   Medicines  help decrease your symptoms and clear your stuffy nose  Take your medicine as directed  Contact your healthcare provider if you think your medicine is not helping or if you have side effects  Tell him of her if you are allergic to any medicine  Keep a list of the medicines, vitamins, and herbs you take  Include the amounts, and when and why you take them  Bring the list or the pill bottles to follow-up visits  Carry your medicine list with you in case of an emergency  How to manage allergic rhinitis:  The best way to manage allergic rhinitis is to avoid allergens that can trigger your symptoms  Any of the following may help decrease your symptoms:  Rinse your nose and sinuses  with a salt water solution or use a salt water nasal spray  This will help thin the mucus in your nose and rinse away pollen and dirt  It will also help reduce swelling so you can breathe normally  Ask your healthcare provider how often to rinse your nose  Reduce exposure to dust mites  Wash sheets and towels in hot water every week  Cover your pillows and mattresses with allergen-free covers  Limit the number of stuffed animals and soft toys your child has  Wash your child's toys in hot water regularly  Vacuum weekly and use a vacuum  with an air filter  If possible, get rid of carpets and curtains  These collect dust and dust mites  Reduce exposure to pollen  Keep windows and doors closed in your house and car  Stay inside when air pollution or the pollen count is high  Run your air conditioner on recycle, and change air filters often  Shower and wash your hair before bed every night to rinse away pollen  Reduce exposure to pet dander  If possible, do not keep cats, dogs, birds, or other pets  If you do keep pets in your home, keep them out of bedrooms and carpeted rooms  Bathe them often  Reduce exposure to mold  Do not spend time in basements  Choose artificial plants instead of live plants  Keep your home's humidity at less than 45%  Do not have ponds or standing water in your home or yard  Do not smoke  Avoid others who smoke  Ask your healthcare provider for information if you currently smoke and need help to quit  Follow up with your healthcare provider as directed: You may need to see an allergist often to control your symptoms  Write down your questions so you remember to ask them during your visits  © Copyright [employer] 2022 Information is for End User's use only and may not be sold, redistributed or otherwise used for commercial purposes  All illustrations and images included in CareNotes® are the copyrighted property of A D [employer] M , Inc  or Emerita Richardson  The above information is an  only  It is not intended as medical advice for individual conditions or treatments  Talk to your doctor, nurse or pharmacist before following any medical regimen to see if it is safe and effective for you

## 2022-08-02 ENCOUNTER — OFFICE VISIT (OUTPATIENT)
Dept: URGENT CARE | Facility: MEDICAL CENTER | Age: 67
End: 2022-08-02
Payer: MEDICARE

## 2022-08-02 VITALS
RESPIRATION RATE: 16 BRPM | DIASTOLIC BLOOD PRESSURE: 88 MMHG | TEMPERATURE: 96.2 F | HEART RATE: 76 BPM | OXYGEN SATURATION: 99 % | SYSTOLIC BLOOD PRESSURE: 138 MMHG

## 2022-08-02 DIAGNOSIS — J06.9 VIRAL UPPER RESPIRATORY ILLNESS: Primary | ICD-10-CM

## 2022-08-02 DIAGNOSIS — R05.1 ACUTE COUGH: ICD-10-CM

## 2022-08-02 LAB
SARS-COV-2 AG UPPER RESP QL IA: NEGATIVE
VALID CONTROL: NORMAL

## 2022-08-02 PROCEDURE — 99213 OFFICE O/P EST LOW 20 MIN: CPT | Performed by: STUDENT IN AN ORGANIZED HEALTH CARE EDUCATION/TRAINING PROGRAM

## 2022-08-02 PROCEDURE — G0463 HOSPITAL OUTPT CLINIC VISIT: HCPCS | Performed by: STUDENT IN AN ORGANIZED HEALTH CARE EDUCATION/TRAINING PROGRAM

## 2022-08-02 PROCEDURE — 87811 SARS-COV-2 COVID19 W/OPTIC: CPT | Performed by: STUDENT IN AN ORGANIZED HEALTH CARE EDUCATION/TRAINING PROGRAM

## 2022-08-02 NOTE — PROGRESS NOTES
[facility]        NAME: Mario Faria is a 77 y o  male  : 1955    MRN: [id number]  DATE: 2022  TIME: 9:08 AM    Assessment and Plan   Viral upper respiratory illness [J06 9]  1  Viral upper respiratory illness       Patient very upset that I would not give him antibiotics  I explained to patient that giving antibiotics after 3 days of symptoms is not appropriate  He was also asking for a steroid injection to help with his right sided lymphadenopathy  I explained that this would lower his immunity and leave him susceptible to a bacterial infection on top of the viral infection he already has  I explained that this virus may make him feel sub-optimal for 7-10 days  If his symptoms progressively get worse by day 7-8, or there are other clear signs of infection (crackles on lung exam, localized sinus pain, bulging TM, erythematous plaques on the pharynx), then it would be appropriate to give antibiotics  I offered patient an oral antihistamine which he denied has he has them at home  I encouraged him to use the Flonase and ear drops, both of which were prescribed to him on   He refused any kind of mucolytic because of his paroxysmal a-fib  I apologized for his frustration, but again, reiterated that antibiotics would not be appropriate at this time  Patient Instructions       Follow up with PCP in 3-5 days  Proceed to  ER if symptoms worsen  Continue ear drops and Flonase     Chief Complaint     Chief Complaint   Patient presents with    Earache     Patient relates he was seen here for right ear pain on  and prescribed ear drops  Here today due to increased pain and swelling  C/O cough, congestion, and sinus symptoms  Unsure of fever  History of Present Illness       HPI     Patient seen here on  for right ear pain and given Flonase and ear drops  He states that he continues to have ear pain and that he feels more congested   This is now day 3 of his symptoms  He denies checking his temperature  He notes that he has a swollen lymph node on the right side of his neck  He states that he feels it every time he swallows  It does not obstruct his breathing  Review of Systems   Review of Systems   As noted in HPI     Current Medications       Current Outpatient Medications:     amLODIPine (NORVASC) 5 mg tablet, TAKE 1 TABLET TWICE A DAY, Disp: 180 tablet, Rfl: 3    apixaban (Eliquis) 5 mg, Take 1 tablet (5 mg total) by mouth 2 (two) times a day, Disp: 180 tablet, Rfl: 4    atorvastatin (LIPITOR) 40 mg tablet, Take 1 tablet (40 mg total) by mouth daily, Disp: 90 tablet, Rfl: 4    Cholecalciferol (VITAMIN D3) 1000 units CAPS, 5,000 Units  , Disp: , Rfl:     Coenzyme Q10 (CO Q-10) 200 MG CAPS, Patient states that he takes this on a daily basis   , Disp: , Rfl:     Cyanocobalamin ER 1000 MCG TBCR, 1 daily, Disp: , Rfl:     flecainide (TAMBOCOR) 100 mg tablet, TAKE 1 TABLET EVERY 12 HOURS, Disp: 180 tablet, Rfl: 3    fluticasone (FLONASE) 50 mcg/act nasal spray, 1 spray into each nostril daily, Disp: 9 9 mL, Rfl: 0    lisinopril (ZESTRIL) 20 mg tablet, Take 1 tablet (20 mg total) by mouth 2 (two) times a day, Disp: 180 tablet, Rfl: 4    LORazepam (ATIVAN) 1 mg tablet, Take 1 tablet (1 mg total) by mouth 2 (two) times a day (Patient taking differently: Take 1 mg by mouth 2 (two) times a day PRN), Disp: 180 tablet, Rfl: 0    metoprolol tartrate (LOPRESSOR) 25 mg tablet, TAKE 1 TABLET TWICE A DAY, Disp: 180 tablet, Rfl: 3    Multiple Vitamins-Minerals (MULTIVITAMIN ADULT PO), take 1 by Oral route once, Disp: , Rfl:     neomycin-polymyxin-hydrocortisone (CORTISPORIN) 0 35%-10,000 units/mL-1% otic suspension, Administer 3 drops to the right ear 4 (four) times a day for 5 days, Disp: 3 mL, Rfl: 0    selenium 200 mcg, take one tablet daily, Disp: , Rfl:     ZINC PICOLINATE PO, Take 22 mg by mouth daily  , Disp: , Rfl:     acyclovir (ZOVIRAX) 800 mg tablet, Take 1 tablet (800 mg total) by mouth 2 (two) times a day, Disp: 180 tablet, Rfl: 1    Current Allergies     Allergies as of 08/02/2022 - Reviewed 08/02/2022   Allergen Reaction Noted    No active allergies  04/27/2018            The following portions of the patient's history were reviewed and updated as appropriate: allergies, current medications, past family history, past medical history, past social history, past surgical history and problem list      Past Medical History:   Diagnosis Date    Atrial fibrillation ([address] ) 06/26/2015    Colon polyp     HLD (hyperlipidemia)     HTN (hypertension)     Hypertension     Irregular heart beat     Multiple thyroid nodules     Positive FIT (fecal immunochemical test)     Pre-diabetes     Vitamin D deficiency        Past Surgical History:   Procedure Laterality Date    CARDIOVERSION  06/28/2015    COLONOSCOPY      US GUIDED THYROID BIOPSY  10/16/2020       Family History   Problem Relation Age of Onset    Heart attack Father 61        MI    Heart attack Paternal Uncle         MI         Medications have been verified  Objective   /88   Pulse 76   Temp (!) 96 2 °F (35 7 °C) (Tympanic)   Resp 16   SpO2 99%   No LMP for male patient  Physical Exam     Physical Exam  Constitutional:       General: He is not in acute distress  Appearance: He is not ill-appearing  HENT:      Right Ear: Tympanic membrane and external ear normal       Left Ear: Tympanic membrane and external ear normal       Mouth/Throat:      Pharynx: Uvula midline  No pharyngeal swelling or posterior oropharyngeal erythema  Cardiovascular:      Rate and Rhythm: Normal rate and regular rhythm  Pulmonary:      Effort: Pulmonary effort is normal  No respiratory distress  Breath sounds: No wheezing  Lymphadenopathy:      Head:      Left side of head: Tonsillar adenopathy present   No submental, submandibular, preauricular, posterior auricular or occipital adenopathy  Neurological:      Mental Status: He is alert     Psychiatric:         Attention and Perception: Attention normal

## 2022-08-29 ENCOUNTER — RA CDI HCC (OUTPATIENT)
Dept: OTHER | Facility: HOSPITAL | Age: 67
End: 2022-08-29

## 2022-08-29 ENCOUNTER — APPOINTMENT (OUTPATIENT)
Dept: LAB | Facility: CLINIC | Age: 67
End: 2022-08-29
Payer: MEDICARE

## 2022-08-29 DIAGNOSIS — E13.65 OTHER SPECIFIED DIABETES MELLITUS WITH HYPERGLYCEMIA, WITHOUT LONG-TERM CURRENT USE OF INSULIN (HCC): ICD-10-CM

## 2022-08-29 DIAGNOSIS — Z79.01 LONG TERM CURRENT USE OF ANTICOAGULANT THERAPY: ICD-10-CM

## 2022-08-29 DIAGNOSIS — Z51.81 ENCOUNTER FOR MONITORING FLECAINIDE THERAPY: ICD-10-CM

## 2022-08-29 DIAGNOSIS — E55.9 VITAMIN D DEFICIENCY, UNSPECIFIED: ICD-10-CM

## 2022-08-29 DIAGNOSIS — I10 ESSENTIAL HYPERTENSION, BENIGN: ICD-10-CM

## 2022-08-29 DIAGNOSIS — Z79.899 ENCOUNTER FOR MONITORING FLECAINIDE THERAPY: ICD-10-CM

## 2022-08-29 DIAGNOSIS — Z12.5 PROSTATE CANCER SCREENING: ICD-10-CM

## 2022-08-29 DIAGNOSIS — R79.82 CRP ELEVATED: ICD-10-CM

## 2022-08-29 DIAGNOSIS — E78.2 MIXED HYPERLIPIDEMIA: ICD-10-CM

## 2022-08-29 DIAGNOSIS — E04.2 MULTIPLE THYROID NODULES: ICD-10-CM

## 2022-08-29 LAB
25(OH)D3 SERPL-MCNC: 89.5 NG/ML (ref 30–100)
ALBUMIN SERPL BCP-MCNC: 3.9 G/DL (ref 3.5–5)
ALP SERPL-CCNC: 61 U/L (ref 46–116)
ALT SERPL W P-5'-P-CCNC: 20 U/L (ref 12–78)
AMORPH URATE CRY URNS QL MICRO: ABNORMAL
ANION GAP SERPL CALCULATED.3IONS-SCNC: 3 MMOL/L (ref 4–13)
AST SERPL W P-5'-P-CCNC: 7 U/L (ref 5–45)
BACTERIA UR QL AUTO: ABNORMAL /HPF
BASOPHILS # BLD AUTO: 0.08 THOUSANDS/ΜL (ref 0–0.1)
BASOPHILS NFR BLD AUTO: 1 % (ref 0–1)
BILIRUB SERPL-MCNC: 0.82 MG/DL (ref 0.2–1)
BILIRUB UR QL STRIP: NEGATIVE
BUN SERPL-MCNC: 17 MG/DL (ref 5–25)
CALCIUM SERPL-MCNC: 9.3 MG/DL (ref 8.3–10.1)
CHLORIDE SERPL-SCNC: 108 MMOL/L (ref 96–108)
CLARITY UR: CLEAR
CO2 SERPL-SCNC: 27 MMOL/L (ref 21–32)
COLOR UR: YELLOW
CREAT SERPL-MCNC: 0.75 MG/DL (ref 0.6–1.3)
CRP SERPL QL: <3 MG/L
EOSINOPHIL # BLD AUTO: 0.3 THOUSAND/ΜL (ref 0–0.61)
EOSINOPHIL NFR BLD AUTO: 3 % (ref 0–6)
ERYTHROCYTE [DISTWIDTH] IN BLOOD BY AUTOMATED COUNT: 13.2 % (ref 11.6–15.1)
EST. AVERAGE GLUCOSE BLD GHB EST-MCNC: 123 MG/DL
GFR SERPL CREATININE-BSD FRML MDRD: 95 ML/MIN/1.73SQ M
GLUCOSE P FAST SERPL-MCNC: 93 MG/DL (ref 65–99)
GLUCOSE UR STRIP-MCNC: NEGATIVE MG/DL
HBA1C MFR BLD: 5.9 %
HCT VFR BLD AUTO: 49 % (ref 36.5–49.3)
HGB BLD-MCNC: 15.6 G/DL (ref 12–17)
HGB UR QL STRIP.AUTO: NEGATIVE
IMM GRANULOCYTES # BLD AUTO: 0.04 THOUSAND/UL (ref 0–0.2)
IMM GRANULOCYTES NFR BLD AUTO: 0 % (ref 0–2)
KETONES UR STRIP-MCNC: NEGATIVE MG/DL
LDLC SERPL DIRECT ASSAY-MCNC: 68 MG/DL (ref 0–100)
LEUKOCYTE ESTERASE UR QL STRIP: NEGATIVE
LYMPHOCYTES # BLD AUTO: 2.62 THOUSANDS/ΜL (ref 0.6–4.47)
LYMPHOCYTES NFR BLD AUTO: 26 % (ref 14–44)
MAGNESIUM SERPL-MCNC: 2.4 MG/DL (ref 1.6–2.6)
MCH RBC QN AUTO: 29.8 PG (ref 26.8–34.3)
MCHC RBC AUTO-ENTMCNC: 31.8 G/DL (ref 31.4–37.4)
MCV RBC AUTO: 94 FL (ref 82–98)
MONOCYTES # BLD AUTO: 0.82 THOUSAND/ΜL (ref 0.17–1.22)
MONOCYTES NFR BLD AUTO: 8 % (ref 4–12)
MUCOUS THREADS UR QL AUTO: ABNORMAL
NEUTROPHILS # BLD AUTO: 6.42 THOUSANDS/ΜL (ref 1.85–7.62)
NEUTS SEG NFR BLD AUTO: 62 % (ref 43–75)
NITRITE UR QL STRIP: NEGATIVE
NON-SQ EPI CELLS URNS QL MICRO: ABNORMAL /HPF
NRBC BLD AUTO-RTO: 0 /100 WBCS
NT-PROBNP SERPL-MCNC: 247 PG/ML
PH UR STRIP.AUTO: 7 [PH]
PLATELET # BLD AUTO: 327 THOUSANDS/UL (ref 149–390)
PMV BLD AUTO: 9.8 FL (ref 8.9–12.7)
POTASSIUM SERPL-SCNC: 4.3 MMOL/L (ref 3.5–5.3)
PROT SERPL-MCNC: 7.4 G/DL (ref 6.4–8.4)
PROT UR STRIP-MCNC: ABNORMAL MG/DL
PSA SERPL-MCNC: 3.1 NG/ML (ref 0–4)
RBC # BLD AUTO: 5.23 MILLION/UL (ref 3.88–5.62)
RBC #/AREA URNS AUTO: ABNORMAL /HPF
SODIUM SERPL-SCNC: 138 MMOL/L (ref 135–147)
SP GR UR STRIP.AUTO: 1.02 (ref 1–1.03)
T4 FREE SERPL-MCNC: 1.24 NG/DL (ref 0.76–1.46)
TRIGL SERPL-MCNC: 59 MG/DL
TSH SERPL DL<=0.05 MIU/L-ACNC: 0.74 UIU/ML (ref 0.45–4.5)
UROBILINOGEN UR STRIP-ACNC: <2 MG/DL
WBC # BLD AUTO: 10.28 THOUSAND/UL (ref 4.31–10.16)
WBC #/AREA URNS AUTO: ABNORMAL /HPF

## 2022-08-29 PROCEDURE — 81001 URINALYSIS AUTO W/SCOPE: CPT

## 2022-08-29 PROCEDURE — 80181 DRUG ASSAY FLECAINIDE: CPT

## 2022-08-29 PROCEDURE — 83721 ASSAY OF BLOOD LIPOPROTEIN: CPT

## 2022-08-29 PROCEDURE — 83735 ASSAY OF MAGNESIUM: CPT

## 2022-08-29 PROCEDURE — 84443 ASSAY THYROID STIM HORMONE: CPT

## 2022-08-29 PROCEDURE — G0103 PSA SCREENING: HCPCS

## 2022-08-29 PROCEDURE — 84439 ASSAY OF FREE THYROXINE: CPT

## 2022-08-29 PROCEDURE — 36415 COLL VENOUS BLD VENIPUNCTURE: CPT

## 2022-08-29 PROCEDURE — 84478 ASSAY OF TRIGLYCERIDES: CPT

## 2022-08-29 PROCEDURE — 86140 C-REACTIVE PROTEIN: CPT

## 2022-08-29 PROCEDURE — 82306 VITAMIN D 25 HYDROXY: CPT

## 2022-08-29 PROCEDURE — 83880 ASSAY OF NATRIURETIC PEPTIDE: CPT

## 2022-08-29 PROCEDURE — 83036 HEMOGLOBIN GLYCOSYLATED A1C: CPT

## 2022-08-29 PROCEDURE — 85025 COMPLETE CBC W/AUTO DIFF WBC: CPT

## 2022-08-29 PROCEDURE — 80053 COMPREHEN METABOLIC PANEL: CPT

## 2022-08-29 NOTE — PROGRESS NOTES
[facility]  coding opportunities          Chart Reviewed number of suggestions sent to Provider: 1     Patients Insurance     Medicare Insurance: Medicare        [id number]

## 2022-08-31 ENCOUNTER — PATIENT MESSAGE (OUTPATIENT)
Dept: FAMILY MEDICINE CLINIC | Facility: CLINIC | Age: 67
End: 2022-08-31

## 2022-08-31 ENCOUNTER — OFFICE VISIT (OUTPATIENT)
Dept: FAMILY MEDICINE CLINIC | Facility: CLINIC | Age: 67
End: 2022-08-31
Payer: MEDICARE

## 2022-08-31 VITALS
TEMPERATURE: 97.4 F | DIASTOLIC BLOOD PRESSURE: 82 MMHG | SYSTOLIC BLOOD PRESSURE: 140 MMHG | OXYGEN SATURATION: 98 % | HEART RATE: 74 BPM | HEIGHT: 72 IN | BODY MASS INDEX: 29.39 KG/M2 | WEIGHT: 217 LBS

## 2022-08-31 DIAGNOSIS — E04.2 MULTIPLE THYROID NODULES: ICD-10-CM

## 2022-08-31 DIAGNOSIS — J32.9 RECURRENT SINUSITIS: ICD-10-CM

## 2022-08-31 DIAGNOSIS — Z87.891 PERSONAL HISTORY OF NICOTINE DEPENDENCE: ICD-10-CM

## 2022-08-31 DIAGNOSIS — R79.89 ELEVATED BRAIN NATRIURETIC PEPTIDE (BNP) LEVEL: ICD-10-CM

## 2022-08-31 DIAGNOSIS — F41.1 ANXIETY STATE: ICD-10-CM

## 2022-08-31 DIAGNOSIS — D72.829 LEUKOCYTOSIS, UNSPECIFIED TYPE: ICD-10-CM

## 2022-08-31 DIAGNOSIS — Z02.89 MEDICATION MANAGEMENT CONTRACT AGREEMENT: ICD-10-CM

## 2022-08-31 DIAGNOSIS — Z79.899 MEDICATION MANAGEMENT: ICD-10-CM

## 2022-08-31 DIAGNOSIS — Z79.01 LONG TERM CURRENT USE OF ANTICOAGULANT THERAPY: ICD-10-CM

## 2022-08-31 DIAGNOSIS — I48.0 PAROXYSMAL ATRIAL FIBRILLATION (HCC): ICD-10-CM

## 2022-08-31 DIAGNOSIS — I10 ESSENTIAL HYPERTENSION: ICD-10-CM

## 2022-08-31 DIAGNOSIS — Z72.0 TOBACCO ABUSE: ICD-10-CM

## 2022-08-31 DIAGNOSIS — E27.8 ADRENAL NODULE (HCC): ICD-10-CM

## 2022-08-31 DIAGNOSIS — F41.9 ANXIETY: Primary | ICD-10-CM

## 2022-08-31 DIAGNOSIS — Z00.00 HEALTH CARE MAINTENANCE: ICD-10-CM

## 2022-08-31 DIAGNOSIS — E78.2 MIXED HYPERLIPIDEMIA: ICD-10-CM

## 2022-08-31 DIAGNOSIS — E66.3 OVERWEIGHT (BMI 25.0-29.9): ICD-10-CM

## 2022-08-31 DIAGNOSIS — R73.9 HYPERGLYCEMIA: ICD-10-CM

## 2022-08-31 DIAGNOSIS — Z12.5 SCREENING FOR PROSTATE CANCER: ICD-10-CM

## 2022-08-31 DIAGNOSIS — Z12.11 SCREENING FOR COLON CANCER: ICD-10-CM

## 2022-08-31 DIAGNOSIS — Z11.59 NEED FOR HEPATITIS C SCREENING TEST: ICD-10-CM

## 2022-08-31 PROBLEM — E13.65 OTHER SPECIFIED DIABETES MELLITUS WITH HYPERGLYCEMIA, WITHOUT LONG-TERM CURRENT USE OF INSULIN (HCC): Status: RESOLVED | Noted: 2021-08-26 | Resolved: 2022-08-31

## 2022-08-31 PROCEDURE — G0439 PPPS, SUBSEQ VISIT: HCPCS | Performed by: FAMILY MEDICINE

## 2022-08-31 PROCEDURE — 99215 OFFICE O/P EST HI 40 MIN: CPT | Performed by: FAMILY MEDICINE

## 2022-08-31 RX ORDER — AMOXICILLIN AND CLAVULANATE POTASSIUM 875; 125 MG/1; MG/1
TABLET, FILM COATED ORAL
Qty: 20 TABLET | Refills: 0 | Status: SHIPPED | OUTPATIENT
Start: 2022-08-31 | End: 2022-09-10

## 2022-08-31 RX ORDER — LORAZEPAM 1 MG/1
1 TABLET ORAL 2 TIMES DAILY
Qty: 180 TABLET | Refills: 1 | Status: SHIPPED | OUTPATIENT
Start: 2022-08-31 | End: 2022-09-13

## 2022-08-31 NOTE — ASSESSMENT & PLAN NOTE
Stable using lorazepam b i d  p r n  [place] PDMP was reviewed  Refill was given    Contract was signed

## 2022-08-31 NOTE — PROGRESS NOTES
Assessment and Plan:     1  Anxiety  2  Medication management   Contract was signed   UDS is ordered  PDMP was reviewed  Lorazepam was reordered  3  Multiple thyroid nodules/adrenal nodule, stable follows with Endocrinology  4  Paroxysmal AFib/increased BNP, stable follows with Cardiology   5  Healthcare maintenance Medicare a [address] completed, hepatitis screening is ordered per CDC protocol   6  Anticoagulated, on Eliquis for AFib by Cardiology  7  Hyperlipidemia, stable on atorvastatin 40 mg daily  8  BMI 29 43 diet exercise weight loss recommended  9  Screening colon cancer, up-to-date with colonoscopy  10  Screening prostate cancer, PSA ordered  11  Tobacco abuse, complete cessation recommended CT screening is ordered for 11/25/2022   12  Leukocytosis, will repeat CBC with differential 2 weeks   13  Recurrent sinusitis, Augmentin prescription was given so patient may use in case recurrence   14  Hyperglycemia, no indication diabetes patient follow low sugar low-carbohydrate diet  14   Patient return in 6 months for office visit blood work sooner if needed      Problem List Items Addressed This Visit        Endocrine    Multiple thyroid nodules     Blood work stable follows with Endocrinology         Relevant Orders    CBC    Comprehensive metabolic panel    Hemoglobin A1C    Lipid Panel with Direct LDL reflex    TSH, 3rd generation with Free T4 reflex    UA (URINE) with reflex to Scope       Respiratory    Recurrent sinusitis     Script for Augmentin was given so patient may use for recurrence         Relevant Medications    amoxicillin-clavulanate (AUGMENTIN) 875-125 mg per tablet    Other Relevant Orders    CBC    Comprehensive metabolic panel    Hemoglobin A1C    Lipid Panel with Direct LDL reflex    TSH, 3rd generation with Free T4 reflex    UA (URINE) with reflex to Scope       Cardiovascular and Mediastinum    Paroxysmal atrial fibrillation (HCC)     Stable follows with Cardiology         Relevant Orders CBC    Comprehensive metabolic panel    Hemoglobin A1C    Lipid Panel with Direct LDL reflex    TSH, 3rd generation with Free T4 reflex    UA (URINE) with reflex to Scope    Essential hypertension     Stable continue present therapy         Relevant Orders    CBC    Comprehensive metabolic panel    Hemoglobin A1C    Lipid Panel with Direct LDL reflex    TSH, 3rd generation with Free T4 reflex    UA (URINE) with reflex to Scope       Other    Mixed hyperlipidemia     Stable continue atorvastatin 40 mg daily         Relevant Orders    CBC    Comprehensive metabolic panel    Hemoglobin A1C    Lipid Panel with Direct LDL reflex    TSH, 3rd generation with Free T4 reflex    UA (URINE) with reflex to Scope    Adrenal nodule (HCC)     Stable follows with Endocrinology         Relevant Orders    CBC    Comprehensive metabolic panel    Hemoglobin A1C    Lipid Panel with Direct LDL reflex    TSH, 3rd generation with Free T4 reflex    UA (URINE) with reflex to Scope    Anxiety state     Stable using lorazepam b i d  p r n  [facility] PDMP was reviewed  Refill was given    Contract was signed         Relevant Orders    CBC    Comprehensive metabolic panel    Hemoglobin A1C    Lipid Panel with Direct LDL reflex    TSH, 3rd generation with Free T4 reflex    UA (URINE) with reflex to Scope    Long term current use of anticoagulant therapy     On Eliquis per Cardiology for AFib         Relevant Orders    CBC    Comprehensive metabolic panel    Hemoglobin A1C    Lipid Panel with Direct LDL reflex    TSH, 3rd generation with Free T4 reflex    UA (URINE) with reflex to Scope    Tobacco abuse     Complete cessation recommended CT ordered for 11/25/2022         Relevant Orders    CT lung screening program    CBC    Comprehensive metabolic panel    Hemoglobin A1C    Lipid Panel with Direct LDL reflex    TSH, 3rd generation with Free T4 reflex    UA (URINE) with reflex to Scope    Screening for colon cancer     Up-to-date with colonoscopy Relevant Orders    CBC    Comprehensive metabolic panel    Hemoglobin A1C    Lipid Panel with Direct LDL reflex    TSH, 3rd generation with Free T4 reflex    UA (URINE) with reflex to Scope    Screening for prostate cancer     PSA normal         Relevant Orders    CBC    Comprehensive metabolic panel    Hemoglobin A1C    Lipid Panel with Direct LDL reflex    TSH, 3rd generation with Free T4 reflex    UA (URINE) with reflex to Scope    Health care maintenance     Medicare a [address] completed, hepatitis-C screening ordered         Relevant Orders    CBC    Comprehensive metabolic panel    Hemoglobin A1C    Lipid Panel with Direct LDL reflex    TSH, 3rd generation with Free T4 reflex    UA (URINE) with reflex to Scope    Elevated brain natriuretic peptide (BNP) level     Mild/chronic follows with Cardiology         Relevant Orders    CBC    Comprehensive metabolic panel    Hemoglobin A1C    Lipid Panel with Direct LDL reflex    TSH, 3rd generation with Free T4 reflex    UA (URINE) with reflex to Scope    Overweight (BMI 25 0-29  9)     BMI 29 43 diet exercise weight loss recommended         Relevant Orders    CBC    Comprehensive metabolic panel    Hemoglobin A1C    Lipid Panel with Direct LDL reflex    TSH, 3rd generation with Free T4 reflex    UA (URINE) with reflex to Scope    Medication management contract agreement     Contract for lorazepam signed, UDS ordered         Relevant Orders    Toxicology screen, urine    CBC and differential    CBC    Comprehensive metabolic panel    Hemoglobin A1C    Lipid Panel with Direct LDL reflex    TSH, 3rd generation with Free T4 reflex    UA (URINE) with reflex to Scope    Hyperglycemia     Acute blood work in history of patient no diabetes just hyperglycemia         Relevant Orders    CBC    Comprehensive metabolic panel    Hemoglobin A1C    Lipid Panel with Direct LDL reflex    TSH, 3rd generation with Free T4 reflex    UA (URINE) with reflex to Scope      Other Visit Diagnoses Anxiety    -  Primary    Relevant Medications    LORazepam (ATIVAN) 1 mg tablet    Other Relevant Orders    Toxicology screen, urine    CBC and differential    CBC    Comprehensive metabolic panel    Hemoglobin A1C    Lipid Panel with Direct LDL reflex    TSH, 3rd generation with Free T4 reflex    UA (URINE) with reflex to Scope    Medication management        Relevant Orders    Toxicology screen, urine    CBC and differential    CBC    Comprehensive metabolic panel    Hemoglobin A1C    Lipid Panel with Direct LDL reflex    TSH, 3rd generation with Free T4 reflex    UA (URINE) with reflex to Scope    Leukocytosis, unspecified type        Relevant Orders    Toxicology screen, urine    CBC and differential    CBC    Comprehensive metabolic panel    Hemoglobin A1C    Lipid Panel with Direct LDL reflex    TSH, 3rd generation with Free T4 reflex    UA (URINE) with reflex to Scope    Need for hepatitis C screening test        Relevant Orders    CBC    Comprehensive metabolic panel    Hemoglobin A1C    Lipid Panel with Direct LDL reflex    TSH, 3rd generation with Free T4 reflex    UA (URINE) with reflex to Scope    Hepatitis C Antibody ([facility])    Personal history of nicotine dependence         Relevant Orders    CT lung screening program    CBC    Comprehensive metabolic panel    Hemoglobin A1C    Lipid Panel with Direct LDL reflex    TSH, 3rd generation with Free T4 reflex    UA (URINE) with reflex to Scope        BMI Counseling: Body mass index is 29 43 kg/m²  The BMI is above normal  Nutrition recommendations include moderation in carbohydrate intake and reducing intake of cholesterol  Exercise recommendations include exercising 3-5 times per week  Rationale for BMI follow-up plan is due to patient being overweight or obese  Depression Screening and Follow-up Plan: Patient was screened for depression during today's encounter  They screened negative with a PHQ-2 score of 0        Preventive health issues were discussed with patient, and age appropriate screening tests were ordered as noted in patient's After Visit Summary  Personalized health advice and appropriate referrals for health education or preventive services given if needed, as noted in patient's After Visit Summary  History of Present Illness:     Patient presents for a Medicare Wellness Visit    Patient returns this office after many years of seeing other providers  At the present time patient doing well without any medical concerns  Patient has a diagnosis of diabetes however discussed with patient no hemoglobin A1c was over 6 5  No fasting sugars over 126  Patient is unsure how he got this diagnosis I believe he is hyperglycemic not diabetic this was change in the computer  Patient Care Team:  Anne Arenas DO as PCP - General (Family Medicine)  Mary Ann Alonso DO as PCP - [address] [place] [address],[address] [facility] ([facility])     Review of Systems:     Review of Systems   Constitutional: Negative  HENT: Negative  Eyes: Negative  Respiratory: Negative  Cardiovascular: Negative  Gastrointestinal: Negative  Endocrine:        HPI   Genitourinary: Negative  Musculoskeletal: Negative  Skin: Negative  Allergic/Immunologic: Negative  Neurological: Negative  Hematological: Negative  Psychiatric/Behavioral: Negative  Problem List:     Patient Active Problem List   Diagnosis    Paroxysmal atrial fibrillation (HCC)    Mixed hyperlipidemia    Adrenal nodule (HCC)    Anxiety state    Multiple thyroid nodules    Long term current use of anticoagulant therapy    Essential hypertension    Tobacco abuse    Screening for colon cancer    Screening for prostate cancer    Health care maintenance    Elevated brain natriuretic peptide (BNP) level    Overweight (BMI 25 0-29  9)    Medication management contract agreement    Recurrent sinusitis    Hyperglycemia      Past Medical and Surgical History:     Past Medical History: Diagnosis Date    Atrial fibrillation ([facility] [address] ) 2015    Colon polyp     HLD (hyperlipidemia)     HTN (hypertension)     Hypertension     Irregular heart beat     Multiple thyroid nodules     Positive FIT (fecal immunochemical test)     Pre-diabetes     Vitamin D deficiency      Past Surgical History:   Procedure Laterality Date    CARDIOVERSION  2015    COLONOSCOPY      US GUIDED THYROID BIOPSY  10/16/2020      Family History:     Family History   Problem Relation Age of Onset    Heart attack Father 61        MI    Heart attack Paternal Uncle         MI      Social History:     Social History     Socioeconomic History    Marital status: /Civil Union     Spouse name: None    Number of children: None    Years of education: None    Highest education level: None   Occupational History    Occupation: retired   Tobacco Use    Smoking status: Current Every Day Smoker     Packs/day: 0 50     Years: 50 00     Pack years: 25 00     Types: Cigarettes     Last attempt to quit: 2014     Years since quittin 6    Smokeless tobacco: Never Used   Substance and Sexual Activity    Alcohol use: Not Currently    Drug use: Never    Sexual activity: Yes     Partners: Female     Birth control/protection: None   Other Topics Concern    None   Social History Narrative    None     Social Determinants of Health     Financial Resource Strain: Low Risk     Difficulty of Paying Living Expenses: Not hard at all   Food Insecurity: Not on file   Transportation Needs: No Transportation Needs    Lack of Transportation (Medical): No    Lack of Transportation (Non-Medical):  No   Physical Activity: Not on file   Stress: Not on file   Social Connections: Not on file   Intimate Partner Violence: Not on file   Housing Stability: Not on file      Medications and Allergies:     Current Outpatient Medications   Medication Sig Dispense Refill    acyclovir (ZOVIRAX) 800 mg tablet Take 1 tablet (800 mg total) by mouth 2 (two) times a day 180 tablet 1    amLODIPine (NORVASC) 5 mg tablet TAKE 1 TABLET TWICE A  tablet 3    amoxicillin-clavulanate (AUGMENTIN) 875-125 mg per tablet Take 1 tablet twice daily with food for 10 days 20 tablet 0    apixaban (Eliquis) 5 mg Take 1 tablet (5 mg total) by mouth 2 (two) times a day 180 tablet 4    atorvastatin (LIPITOR) 40 mg tablet Take 1 tablet (40 mg total) by mouth daily 90 tablet 4    Cholecalciferol (VITAMIN D3) 1000 units CAPS 5,000 Units        Coenzyme Q10 (CO Q-10) 200 MG CAPS Patient states that he takes this on a daily basis   Cyanocobalamin ER 1000 MCG TBCR 1 daily      flecainide (TAMBOCOR) 100 mg tablet TAKE 1 TABLET EVERY 12 HOURS 180 tablet 3    fluticasone (FLONASE) 50 mcg/act nasal spray 1 spray into each nostril daily 9 9 mL 0    lisinopril (ZESTRIL) 20 mg tablet Take 1 tablet (20 mg total) by mouth 2 (two) times a day 180 tablet 4    LORazepam (ATIVAN) 1 mg tablet Take 1 tablet (1 mg total) by mouth 2 (two) times a day  tablet 1    metoprolol tartrate (LOPRESSOR) 25 mg tablet TAKE 1 TABLET TWICE A  tablet 3    Multiple Vitamins-Minerals (MULTIVITAMIN ADULT PO) take 1 by Oral route once      selenium 200 mcg take one tablet daily      ZINC PICOLINATE PO Take 22 mg by mouth daily        neomycin-polymyxin-hydrocortisone (CORTISPORIN) 0 35%-10,000 units/mL-1% otic suspension Administer 3 drops to the right ear 4 (four) times a day for 5 days (Patient not taking: Reported on 8/31/2022) 3 mL 0     No current facility-administered medications for this visit       Allergies   Allergen Reactions    No Active Allergies       Immunizations:     Immunization History   Administered Date(s) Administered    COVID-19 MODERNA VACC 0 5 ML IM 01/26/2021, 03/04/2021    Fluzone Split Quad 0 5 mL 11/10/2017    INFLUENZA 11/10/2017, 11/04/2018, 10/13/2020    Influenza, high dose seasonal 0 7 mL 10/13/2020    Influenza, injectable, quadrivalent, preservative free 0 5 mL 11/04/2018, 10/31/2019    Pneumococcal Conjugate 13-Valent 08/13/2020    Pneumococcal Polysaccharide PPV23 08/26/2021    Tdap 02/13/2020      Health Maintenance:         Topic Date Due    Hepatitis C Screening  Never done    Lung Cancer Screening  11/29/2022    Colorectal Cancer Screening  11/17/2025         Topic Date Due    COVID-19 Vaccine (3 - Booster for Moderna series) 08/04/2021    Influenza Vaccine (1) 09/01/2022      Medicare Screening Tests and Risk Assessments:         Health Risk Assessment:   Patient rates overall health as good  Patient feels that their physical health rating is same  Patient is satisfied with their life  Eyesight was rated as same  Hearing was rated as same  Patient feels that their emotional and mental health rating is same  Patients states they are never, rarely angry  Patient states they are never, rarely unusually tired/fatigued  Pain experienced in the last 7 days has been none  Patient states that he has experienced no weight loss or gain in last 6 months  Depression Screening:   PHQ-2 Score: 0      Fall Risk Screening: In the past year, patient has experienced: no history of falling in past year      Home Safety:  Patient does not have trouble with stairs inside or outside of their home  Patient has working smoke alarms and has working carbon monoxide detector  Home safety hazards include: none  Nutrition:   Current diet is Regular  Medications:   Patient is currently taking over-the-counter supplements  OTC medications include: see medication list  Patient is able to manage medications  Activities of Daily Living (ADLs)/Instrumental Activities of Daily Living (IADLs):   Walk and transfer into and out of bed and chair?: Yes  Dress and groom yourself?: Yes    Bathe or shower yourself?: Yes    Feed yourself?  Yes  Do your laundry/housekeeping?: Yes  Manage your money, pay your bills and track your expenses?: Yes  Make your own meals?: Yes    Do your own shopping?: Yes    Previous Hospitalizations:   Any hospitalizations or ED visits within the last 12 months?: No      Advance Care Planning:   Living will: Yes    Durable POA for healthcare: Yes    Advanced directive: Yes    Advanced directive counseling given: Yes    Five wishes given: No    Patient declined ACP directive: Yes    End of Life Decisions reviewed with patient: Yes    Provider agrees with end of life decisions: Yes      Cognitive Screening:   Provider or family/friend/caregiver concerned regarding cognition?: No    PREVENTIVE SCREENINGS      Cardiovascular Screening:    General: Screening Not Indicated and History Lipid Disorder      Diabetes Screening:     General: Screening Not Indicated and History Diabetes      Colorectal Cancer Screening:     General: Screening Current      Prostate Cancer Screening:    General: Screening Current      Osteoporosis Screening:    General: Screening Not Indicated      Abdominal Aortic Aneurysm (AAA) Screening:    Risk factors include: age between 73-67 yo and tobacco use        General: Screening Not Indicated      Lung Cancer Screening:     General: Screening Current      Hepatitis C Screening:    General: Risks and Benefits Discussed    Hep C Screening Accepted: Yes      Screening, Brief Intervention, and Referral to Treatment (SBIRT)    Screening  Typical number of drinks in a day: 0  Typical number of drinks in a week: 0  Interpretation: Low risk drinking behavior      Single Item Drug Screening:  How often have you used an illegal drug (including marijuana) or a prescription medication for non-medical reasons in the past year? never    Single Item Drug Screen Score: 0  Interpretation: Negative screen for possible drug use disorder    No exam data present     Physical Exam:     /82 (BP Location: Right arm, Patient Position: Sitting, Cuff Size: Large)   Pulse 74   Temp (!) 97 4 °F (36 3 °C) (Temporal)   Ht 6' (1 829 m)   Wt 98 4 kg (217 lb)   SpO2 98%   BMI 29 43 kg/m²     Physical Exam  Vitals and nursing note reviewed  Constitutional:       Appearance: Normal appearance  HENT:      Head: Normocephalic and atraumatic  Right Ear: Tympanic membrane normal       Left Ear: Tympanic membrane normal       Nose: Nose normal       Mouth/Throat:      Mouth: Mucous membranes are moist       Pharynx: Oropharynx is clear  No oropharyngeal exudate or posterior oropharyngeal erythema  Eyes:      General: No scleral icterus  Neck:      Vascular: No carotid bruit  Cardiovascular:      Rate and Rhythm: Normal rate and regular rhythm  Heart sounds: Normal heart sounds  Pulmonary:      Effort: Pulmonary effort is normal       Breath sounds: Normal breath sounds  Abdominal:      General: Bowel sounds are normal       Palpations: Abdomen is soft  Tenderness: There is no abdominal tenderness  Musculoskeletal:      Cervical back: Neck supple  Right lower leg: No edema  Left lower leg: No edema  Skin:     General: Skin is warm and dry  Neurological:      General: No focal deficit present  Mental Status: He is alert     Psychiatric:         Mood and Affect: Mood normal           Herminio Luna DO

## 2022-08-31 NOTE — PATIENT INSTRUCTIONS
Complete blood count and urine toxicology in 2 weeks, can be nonfasting   I recommend complete tobacco cessation   Repeat CT scan after 11/24/2022 for lung cancer screening   Low sugar low-fat diet recommended   Follow all specialist per their instructions   Return in 6 months for office visit blood work sooner if needed

## 2022-09-01 ENCOUNTER — TELEPHONE (OUTPATIENT)
Dept: FAMILY MEDICINE CLINIC | Facility: CLINIC | Age: 67
End: 2022-09-01

## 2022-09-01 NOTE — TELEPHONE ENCOUNTER
----- Message from Aurea CONDON sent at 8/31/2022  5:23 PM EDT -----  Regarding: FW: Augmentin    ----- Message -----  From: Merlyn Mckeon  Sent: 8/31/2022   5:06 PM EDT  To: [facility]  Subject: Augmentin                                        Hi Evaline Huge do you want me to start the Augmentin now or use when needed  Please advise    Thanks   Alyssia Cruz

## 2022-09-12 ENCOUNTER — TELEPHONE (OUTPATIENT)
Dept: FAMILY MEDICINE CLINIC | Facility: CLINIC | Age: 67
End: 2022-09-12

## 2022-09-12 ENCOUNTER — APPOINTMENT (OUTPATIENT)
Dept: LAB | Facility: CLINIC | Age: 67
End: 2022-09-12
Payer: MEDICARE

## 2022-09-12 DIAGNOSIS — I48.0 PAROXYSMAL ATRIAL FIBRILLATION (HCC): ICD-10-CM

## 2022-09-12 DIAGNOSIS — F41.9 ANXIETY: ICD-10-CM

## 2022-09-12 DIAGNOSIS — Z79.899 MEDICATION MANAGEMENT: ICD-10-CM

## 2022-09-12 DIAGNOSIS — Z02.89 MEDICATION MANAGEMENT CONTRACT AGREEMENT: ICD-10-CM

## 2022-09-12 DIAGNOSIS — D72.829 LEUKOCYTOSIS, UNSPECIFIED TYPE: ICD-10-CM

## 2022-09-12 LAB
BASOPHILS # BLD AUTO: 0.1 THOUSANDS/ΜL (ref 0–0.1)
BASOPHILS NFR BLD AUTO: 1 % (ref 0–1)
EOSINOPHIL # BLD AUTO: 0.16 THOUSAND/ΜL (ref 0–0.61)
EOSINOPHIL NFR BLD AUTO: 1 % (ref 0–6)
ERYTHROCYTE [DISTWIDTH] IN BLOOD BY AUTOMATED COUNT: 13.2 % (ref 11.6–15.1)
HCT VFR BLD AUTO: 45.2 % (ref 36.5–49.3)
HGB BLD-MCNC: 14.8 G/DL (ref 12–17)
IMM GRANULOCYTES # BLD AUTO: 0.05 THOUSAND/UL (ref 0–0.2)
IMM GRANULOCYTES NFR BLD AUTO: 0 % (ref 0–2)
LYMPHOCYTES # BLD AUTO: 1.77 THOUSANDS/ΜL (ref 0.6–4.47)
LYMPHOCYTES NFR BLD AUTO: 15 % (ref 14–44)
MCH RBC QN AUTO: 30.6 PG (ref 26.8–34.3)
MCHC RBC AUTO-ENTMCNC: 32.7 G/DL (ref 31.4–37.4)
MCV RBC AUTO: 94 FL (ref 82–98)
MONOCYTES # BLD AUTO: 0.68 THOUSAND/ΜL (ref 0.17–1.22)
MONOCYTES NFR BLD AUTO: 6 % (ref 4–12)
NEUTROPHILS # BLD AUTO: 8.9 THOUSANDS/ΜL (ref 1.85–7.62)
NEUTS SEG NFR BLD AUTO: 77 % (ref 43–75)
NRBC BLD AUTO-RTO: 0 /100 WBCS
PLATELET # BLD AUTO: 319 THOUSANDS/UL (ref 149–390)
PMV BLD AUTO: 9.5 FL (ref 8.9–12.7)
RBC # BLD AUTO: 4.83 MILLION/UL (ref 3.88–5.62)
WBC # BLD AUTO: 11.66 THOUSAND/UL (ref 4.31–10.16)

## 2022-09-12 PROCEDURE — 36415 COLL VENOUS BLD VENIPUNCTURE: CPT

## 2022-09-12 PROCEDURE — 85025 COMPLETE CBC W/AUTO DIFF WBC: CPT

## 2022-09-12 PROCEDURE — 80307 DRUG TEST PRSMV CHEM ANLYZR: CPT

## 2022-09-12 NOTE — TELEPHONE ENCOUNTER
Spoke to patient, he wanted to know if he should repeat the CBC after he finishes taking the antibiotics  If so a new lab order will be needed   Please advise

## 2022-09-12 NOTE — TELEPHONE ENCOUNTER
Patient called stated if he has to repeat and when does provider want him to repeat recent labs because they were abnormal please call patient regarding this matter

## 2022-09-13 ENCOUNTER — TELEPHONE (OUTPATIENT)
Dept: FAMILY MEDICINE CLINIC | Facility: CLINIC | Age: 67
End: 2022-09-13

## 2022-09-13 DIAGNOSIS — F41.9 ANXIETY: ICD-10-CM

## 2022-09-13 LAB
AMPHETAMINES UR QL SCN: NEGATIVE NG/ML
BARBITURATES UR QL SCN: NEGATIVE NG/ML
BENZODIAZ UR QL: NEGATIVE NG/ML
BZE UR QL: NEGATIVE NG/ML
CANNABINOIDS UR QL SCN: NEGATIVE NG/ML
METHADONE UR QL SCN: NEGATIVE NG/ML
OPIATES UR QL: NEGATIVE NG/ML
PCP UR QL: NEGATIVE NG/ML
PROPOXYPH UR QL SCN: NEGATIVE NG/ML

## 2022-09-13 RX ORDER — LORAZEPAM 1 MG/1
1 TABLET ORAL 2 TIMES DAILY
Qty: 180 TABLET | Refills: 1 | Status: SHIPPED | OUTPATIENT
Start: 2022-09-13 | End: 2023-09-11 | Stop reason: SDUPTHER

## 2022-09-13 NOTE — TELEPHONE ENCOUNTER
He may repeat CBC after he is done with his antibiotics    He will not need another order as on his is after the date of service listed he may complete

## 2022-09-13 NOTE — TELEPHONE ENCOUNTER
Patient notified of results, he advises he is just experiencing sinus issues and starting taking antibiotics

## 2022-09-13 NOTE — TELEPHONE ENCOUNTER
----- Message from Mercy Meredith MD sent at 9/12/2022  1:59 PM EDT -----  White cells up a tad-any symptomsinfection?

## 2022-09-23 ENCOUNTER — APPOINTMENT (OUTPATIENT)
Dept: LAB | Facility: CLINIC | Age: 67
End: 2022-09-23
Payer: MEDICARE

## 2022-09-23 DIAGNOSIS — D72.829 LEUKOCYTOSIS, UNSPECIFIED TYPE: ICD-10-CM

## 2022-09-23 DIAGNOSIS — E78.2 MIXED HYPERLIPIDEMIA: ICD-10-CM

## 2022-09-23 DIAGNOSIS — I10 ESSENTIAL HYPERTENSION: ICD-10-CM

## 2022-09-23 DIAGNOSIS — Z00.00 HEALTH CARE MAINTENANCE: ICD-10-CM

## 2022-09-23 DIAGNOSIS — Z79.01 LONG TERM CURRENT USE OF ANTICOAGULANT THERAPY: ICD-10-CM

## 2022-09-23 DIAGNOSIS — Z02.89 MEDICATION MANAGEMENT CONTRACT AGREEMENT: ICD-10-CM

## 2022-09-23 DIAGNOSIS — Z87.891 PERSONAL HISTORY OF NICOTINE DEPENDENCE: ICD-10-CM

## 2022-09-23 DIAGNOSIS — I48.0 PAROXYSMAL ATRIAL FIBRILLATION (HCC): ICD-10-CM

## 2022-09-23 DIAGNOSIS — R79.89 ELEVATED BRAIN NATRIURETIC PEPTIDE (BNP) LEVEL: ICD-10-CM

## 2022-09-23 DIAGNOSIS — Z12.5 SCREENING FOR PROSTATE CANCER: ICD-10-CM

## 2022-09-23 DIAGNOSIS — J32.9 RECURRENT SINUSITIS: ICD-10-CM

## 2022-09-23 DIAGNOSIS — E27.8 ADRENAL NODULE (HCC): ICD-10-CM

## 2022-09-23 DIAGNOSIS — F41.1 ANXIETY STATE: ICD-10-CM

## 2022-09-23 DIAGNOSIS — E04.2 MULTIPLE THYROID NODULES: ICD-10-CM

## 2022-09-23 DIAGNOSIS — Z12.11 SCREENING FOR COLON CANCER: ICD-10-CM

## 2022-09-23 DIAGNOSIS — E66.3 OVERWEIGHT (BMI 25.0-29.9): ICD-10-CM

## 2022-09-23 DIAGNOSIS — Z11.59 NEED FOR HEPATITIS C SCREENING TEST: ICD-10-CM

## 2022-09-23 DIAGNOSIS — F41.9 ANXIETY: ICD-10-CM

## 2022-09-23 DIAGNOSIS — I48.0 PAROXYSMAL ATRIAL FIBRILLATION (HCC): Primary | ICD-10-CM

## 2022-09-23 DIAGNOSIS — R73.9 HYPERGLYCEMIA: ICD-10-CM

## 2022-09-23 DIAGNOSIS — Z72.0 TOBACCO ABUSE: ICD-10-CM

## 2022-09-23 DIAGNOSIS — Z79.899 MEDICATION MANAGEMENT: ICD-10-CM

## 2022-09-23 LAB
ERYTHROCYTE [DISTWIDTH] IN BLOOD BY AUTOMATED COUNT: 13.3 % (ref 11.6–15.1)
HCT VFR BLD AUTO: 46.4 % (ref 36.5–49.3)
HGB BLD-MCNC: 15.1 G/DL (ref 12–17)
MCH RBC QN AUTO: 30.3 PG (ref 26.8–34.3)
MCHC RBC AUTO-ENTMCNC: 32.5 G/DL (ref 31.4–37.4)
MCV RBC AUTO: 93 FL (ref 82–98)
PLATELET # BLD AUTO: 328 THOUSANDS/UL (ref 149–390)
PMV BLD AUTO: 9.5 FL (ref 8.9–12.7)
RBC # BLD AUTO: 4.98 MILLION/UL (ref 3.88–5.62)
WBC # BLD AUTO: 9.7 THOUSAND/UL (ref 4.31–10.16)

## 2022-09-23 PROCEDURE — 85027 COMPLETE CBC AUTOMATED: CPT

## 2022-09-23 PROCEDURE — 80181 DRUG ASSAY FLECAINIDE: CPT

## 2022-09-23 PROCEDURE — 36415 COLL VENOUS BLD VENIPUNCTURE: CPT

## 2022-09-29 LAB — FLECAINIDE SERPL-MCNC: 0.15 UG/ML (ref 0.2–1)

## 2022-10-13 LAB — FLECAINIDE SERPL-MCNC: 0.19 UG/ML (ref 0.2–1)

## 2022-10-14 ENCOUNTER — HOSPITAL ENCOUNTER (OUTPATIENT)
Dept: ULTRASOUND IMAGING | Facility: HOSPITAL | Age: 67
Discharge: HOME | End: 2022-10-14
Payer: MEDICARE

## 2022-10-14 DIAGNOSIS — E04.2 MULTIPLE THYROID NODULES: ICD-10-CM

## 2022-10-14 PROCEDURE — 76536 US EXAM OF HEAD AND NECK: CPT

## 2022-10-26 ENCOUNTER — TELEPHONE (OUTPATIENT)
Dept: CARDIOLOGY CLINIC | Facility: CLINIC | Age: 67
End: 2022-10-26

## 2022-10-26 ENCOUNTER — OFFICE VISIT (OUTPATIENT)
Dept: CARDIOLOGY CLINIC | Facility: CLINIC | Age: 67
End: 2022-10-26
Payer: MEDICARE

## 2022-10-26 VITALS
WEIGHT: 216.8 LBS | SYSTOLIC BLOOD PRESSURE: 126 MMHG | HEIGHT: 72 IN | BODY MASS INDEX: 29.36 KG/M2 | HEART RATE: 57 BPM | DIASTOLIC BLOOD PRESSURE: 80 MMHG

## 2022-10-26 DIAGNOSIS — I10 ESSENTIAL HYPERTENSION: ICD-10-CM

## 2022-10-26 DIAGNOSIS — I48.0 PAROXYSMAL ATRIAL FIBRILLATION (HCC): Primary | ICD-10-CM

## 2022-10-26 DIAGNOSIS — E78.2 MIXED HYPERLIPIDEMIA: ICD-10-CM

## 2022-10-26 DIAGNOSIS — E78.2 HYPERLIPIDEMIA, MIXED: ICD-10-CM

## 2022-10-26 DIAGNOSIS — Z72.0 TOBACCO ABUSE: ICD-10-CM

## 2022-10-26 DIAGNOSIS — Z79.01 LONG TERM CURRENT USE OF ANTICOAGULANT THERAPY: ICD-10-CM

## 2022-10-26 DIAGNOSIS — Z79.899 LONG TERM CURRENT USE OF ANTIARRHYTHMIC MEDICAL THERAPY: ICD-10-CM

## 2022-10-26 PROCEDURE — 93000 ELECTROCARDIOGRAM COMPLETE: CPT | Performed by: INTERNAL MEDICINE

## 2022-10-26 PROCEDURE — 99214 OFFICE O/P EST MOD 30 MIN: CPT | Performed by: INTERNAL MEDICINE

## 2022-10-26 NOTE — PATIENT INSTRUCTIONS
CARDIOLOGY ASSESSMENT & PLAN     1  Paroxysmal atrial fibrillation (HCC)  POCT ECG    Echo complete w/ contrast if indicated    NM myocardial perfusion spect (rx stress and/or rest)   2  Long term current use of antiarrhythmic medical therapy  NM myocardial perfusion spect (rx stress and/or rest)   3  Tobacco abuse     4  Essential hypertension     5  Mixed hyperlipidemia     6  Long term current use of anticoagulant therapy       Paroxysmal atrial fibrillation [facility])  Mr Ken Mills is overall stable from cardiac perspective with no reports of symptoms that suggest AFib recently  Unfortunately continues to smoke  Blood pressure is normal   Exam is overall unremarkable  ECG is benign  QRS duration on EKGs 86 milliseconds  His last blood work from August 2022 is reviewed  His renal function and electrolytes were normal   His flecainide level was 0 19  His direct LDL was 68  He has not had screening for ischemic heart disease since 2015 when he had Persantine nuclear stress test   Given that he is on antiarrhythmic therapy with class 1 C agent he does need periodic screening for CAD  -- I am arranging for a regadenoson nuclear stress test and an echocardiogram to assess cardiac structure and function  He would like to get this scheduled at his convenience in the future  -- regarding anticoagulation i e  mention to him that if he wants to discontinue anticoagulation then it would be reasonable to do a loop recorder implant for monitoring for atrial fibrillation  This device can be implanted in a minor procedure and the device functions for approximately 3 years  If he detects any atrial fibrillation then he will have to go back on  anticoagulation in the future  -- he will continue to follow with his endocrinologist regarding workup of his thyroid nodules and follow-up of his adrenal nodule    -- I again strongly urged him to quit smoking as this is single major risk factor for future cardiovascular events

## 2022-10-26 NOTE — ASSESSMENT & PLAN NOTE
Mr Shona Heath is overall stable from cardiac perspective with no reports of symptoms that suggest AFib recently  Unfortunately continues to smoke  Blood pressure is normal   Exam is overall unremarkable  ECG is benign  QRS duration on EKGs 86 milliseconds  His last blood work from August 2022 is reviewed  His renal function and electrolytes were normal   His flecainide level was 0 19  His direct LDL was 68  He has not had screening for ischemic heart disease since 2015 when he had Persantine nuclear stress test   Given that he is on antiarrhythmic therapy with class 1 C agent he does need periodic screening for CAD  -- I am arranging for a regadenoson nuclear stress test and an echocardiogram to assess cardiac structure and function  He would like to get this scheduled at his convenience in the future  -- regarding anticoagulation i e  mention to him that if he wants to discontinue anticoagulation then it would be reasonable to do a loop recorder implant for monitoring for atrial fibrillation  This device can be implanted in a minor procedure and the device functions for approximately 3 years  If he detects any atrial fibrillation then he will have to go back on  anticoagulation in the future  -- he will continue to follow with his endocrinologist regarding workup of his thyroid nodules and follow-up of his adrenal nodule  -- I again strongly urged him to quit smoking as this is single major risk factor for future cardiovascular events

## 2022-10-26 NOTE — PROGRESS NOTES
[facility]  [address] [facility], [place] [address]  Phone#  [phone]  Fax#  [phone]  *-*-*-*-*-*-*-*-*-*-*-*-*-*-*-*-*-*-*-*-*-*-*-*-*-*-*-*-*-*-*-*-*-*-*-*-*-*-*-*-*-*-*-*-*-*-*-*-*-*-*-*-*-*  Pao Mcfarland DATE: 10/26/22 10:34 AM  PATIENT NAME: Herlinda Clarke   1955    [id number]  AGE:67 y o  SEX: male  ENCOUNTER PROVIDER:Carlita Dietz     PRIMARY CARE PHYSICIAN: Herminio Luna DO    DIAGNOSES:  1  Paroxysmal atrial fibrillation status post cardioversion in 2015   2  Mixed dyslipidemia  3  Benign essential hypertension  4  Chronic tobacco use  5  Suspected sleep apnea  6  Diverticular disease without diverticulitis  7  Adrenal adenoma  8  Multiple thyroid nodules    PERSANTINE NUCLEAR STRESS TEST in July 2015 showed normal perfusion and normal left ventricular systolic function with mildly dilated left ventricular cavity, EF was 58%  ECHOCARDIOGRAM June 2018:  - Mild left ventricular cavity enlargement, normal left  ventricle systolic function and normal diastolic function  EF  approximately 50-55%  - Mild right atrial and mild to moderate left atrial  enlargement  - Aortic valve leaflet sclerosis  No stenosis or regurgitation   - Mitral valve leaflet sclerosis  Trace mitral valve  regurgitation   - Mild tricuspid and pulmonic valve regurgitation   - No pericardial effusion   - No pulmonary hypertension  TRANSESOPHAGEAL ECHOCARDIOGRAM in June 2015 showed mild-to-moderate left atrial enlargement, borderline reduced left ventricular systolic function  CURRENT ECG     Results for orders placed or performed in visit on 10/26/22   POCT ECG    Narrative    Sinus bradycardia, HR 57 beats per minute, normal axis and intervals, sinus arrhythmia noted  No significant chamber hypertrophy enlargement  No evidence of prior infarction  Slightly delayed R-wave transition  CARDIOLOGY ASSESSMENT & PLAN     1   Paroxysmal atrial fibrillation (HCC)  POCT ECG    Echo complete w/ contrast if indicated    NM myocardial perfusion spect (rx stress and/or rest)   2  Long term current use of antiarrhythmic medical therapy  NM myocardial perfusion spect (rx stress and/or rest)   3  Tobacco abuse     4  Essential hypertension     5  Mixed hyperlipidemia     6  Long term current use of anticoagulant therapy       Paroxysmal atrial fibrillation [facility])  Mr Vikash Maynard is overall stable from cardiac perspective with no reports of symptoms that suggest AFib recently  Unfortunately continues to smoke  Blood pressure is normal   Exam is overall unremarkable  ECG is benign  QRS duration on EKGs 86 milliseconds  His last blood work from August 2022 is reviewed  His renal function and electrolytes were normal   His flecainide level was 0 19  His direct LDL was 68  He has not had screening for ischemic heart disease since 2015 when he had Persantine nuclear stress test   Given that he is on antiarrhythmic therapy with class 1 C agent he does need periodic screening for CAD  -- I am arranging for a regadenoson nuclear stress test and an echocardiogram to assess cardiac structure and function  He would like to get this scheduled at his convenience in the future  -- regarding anticoagulation i e  mention to him that if he wants to discontinue anticoagulation then it would be reasonable to do a loop recorder implant for monitoring for atrial fibrillation  This device can be implanted in a minor procedure and the device functions for approximately 3 years  If he detects any atrial fibrillation then he will have to go back on  anticoagulation in the future  -- he will continue to follow with his endocrinologist regarding workup of his thyroid nodules and follow-up of his adrenal nodule  -- I again strongly urged him to quit smoking as this is single major risk factor for future cardiovascular events        INTERVAL HISTORY & HISTORY OF PRESENT ILLNESS     Vikash Maynard is here for follow-up regarding his cardiac comorbidities which include:  Paroxysmal atrial fibrillation, hypertension, dyslipidemia and comorbidities  He was last seen by me in November 2021  Today he reports that he has been overall feeling well from cardiac perspective  He has not experienced any recent chest pain or shortness of breath or dizziness or lightheadedness or palpitations  He has not had any passing out or near passing out episodes  He reports being very active  He does report that he has been losing weight about 6-7 lb a year  He has recently been found to have thyroid nodules  He is working with endocrinologist for further evaluation  He has had no recent hospitalizations or other illnesses  Functional capacity status:  Good   (Excellent- >10 METs; Good: (7-10 METs); Moderate (4-7 METs); Poor (<= 4 METs)    Any chronic stressors:  None   (feeling of poor health, financial problems, and social isolation etc)  Tobacco or alcohol dependence:  He does not drink but he smokes about 8-10 cigarettes a day  Current cardiac medications:  Amlodipine 5 mg daily, lisinopril 20 mg daily, atorvastatin 40 mg daily, apixaban 5 mg twice daily, flecainide 100 mg twice daily, metoprolol tartrate 25 mg twice daily  Blood work from August 29, 2022:  Sodium 138, potassium 4 3, chloride 108, bicarb 27, BUN 17, creatinine 0 75, normal LFTs, NT proBNP level was 247, flecainide level was 0 19 in September  Triglyceride was 59, direct LDL was 68  REVIEW OF SYSTEMS   Positive for:  As noted above in HPI  Negative for: All remaining as reviewed below and in HPI      SYSTEM SYMPTOMS REVIEWED:  General--weight change, fever, night sweats  Respiratory--cough, wheezing, shortness of breath, sputum production  Cardiovascular--chest pain, syncope, dyspnea on exertion, edema, decline in exercise tolerance, claudication   Gastrointestinal--persistent vomiting, diarrhea, abdominal distention, blood in stool   Muscular or skeletal--joint pain or swelling   Neurologic--headaches, syncope, abnormal movement  Hematologic--history of easy bruising and bleeding   Endocrine--thyroid enlargement, heat or cold intolerance, polyuria   Psychiatric--anxiety, depression     *-*-*-*-*-*-*-*-*-*-*-*-*-*-*-*-*-*-*-*-*-*-*-*-*-*-*-*-*-*-*-*-*-*-*-*-*-*-*-*-*-*-*-*-*-*-*-*-*-*-*-*-*-*-  VITAL SIGNS     CURRENT VITAL SIGNS:   Vitals:    10/26/22 0935   BP: 126/80   Pulse: 57   Weight: 98 3 kg (216 lb 12 8 oz)   Height: 6' (1 829 m)       BMI: Body mass index is 29 4 kg/m²  WEIGHTS:   Wt Readings from Last 25 Encounters:   10/26/22 98 3 kg (216 lb 12 8 oz)   08/31/22 98 4 kg (217 lb)   11/23/21 102 kg (225 lb)   08/26/21 101 kg (222 lb)   11/19/20 98 kg (216 lb)   11/17/20 98 9 kg (218 lb)   10/15/20 98 9 kg (218 lb)   08/12/20 97 8 kg (215 lb 9 6 oz)   11/08/19 102 kg (225 lb)   07/22/19 99 8 kg (220 lb)   03/19/19 105 kg (230 lb 9 6 oz)   09/26/18 102 kg (225 lb)      *-*-*-*-*-*-*-*-*-*-*-*-*-*-*-*-*-*-*-*-*-*-*-*-*-*-*-*-*-*-*-*-*-*-*-*-*-*-*-*-*-*-*-*-*-*-*-*-*-*-*-*-*-*-  PHYSICAL EXAM     General Appearance:    Alert, cooperative, no distress, appears stated age   Head, Eyes, ENT:    No obvious abnormality, moist mucous mebranes  Neck:   Supple, no carotid bruit or JVD   Back:     Symmetric, no curvature  Lungs:     Respirations unlabored  Clear to auscultation bilaterally,    Chest wall:    No tenderness or deformity   Heart:    Regular rate and rhythm, S1 and S2 normal, no murmur, rub  or gallop  Abdomen:     Soft, non-tender, No obvious masses, or organomegaly   Extremities:   Extremities warm, no cyanosis or edema    Skin:   to mild varicosities at venostatic changes in lower extremities  Normal skin color, texture, and turgor   No rashes or lesions     *-*-*-*-*-*-*-*-*-*-*-*-*-*-*-*-*-*-*-*-*-*-*-*-*-*-*-*-*-*-*-*-*-*-*-*-*-*-*-*-*-*-*-*-*-*-*-*-*-*-*-*-*-*-  CURRENT MEDICATIONS LIST     Current Outpatient Medications:   • acyclovir (ZOVIRAX) 800 mg tablet, Take 1 tablet (800 mg total) by mouth 2 (two) times a day, Disp: 180 tablet, Rfl: 1  •  amLODIPine (NORVASC) 5 mg tablet, TAKE 1 TABLET TWICE A DAY, Disp: 180 tablet, Rfl: 3  •  apixaban (Eliquis) 5 mg, Take 1 tablet (5 mg total) by mouth 2 (two) times a day, Disp: 180 tablet, Rfl: 4  •  atorvastatin (LIPITOR) 40 mg tablet, Take 1 tablet (40 mg total) by mouth daily, Disp: 90 tablet, Rfl: 4  •  Cholecalciferol (VITAMIN D3) 1000 units CAPS, Take 5,000 Units by mouth 3 (three) times a week, Disp: , Rfl:   •  Coenzyme Q10 (CO Q-10) 200 MG CAPS, Patient states that he takes this on a daily basis   , Disp: , Rfl:   •  Cyanocobalamin ER 1000 MCG TBCR, 1 daily, Disp: , Rfl:   •  flecainide (TAMBOCOR) 100 mg tablet, TAKE 1 TABLET EVERY 12 HOURS, Disp: 180 tablet, Rfl: 3  •  fluticasone (FLONASE) 50 mcg/act nasal spray, 1 spray into each nostril daily, Disp: 9 9 mL, Rfl: 0  •  lisinopril (ZESTRIL) 20 mg tablet, Take 1 tablet (20 mg total) by mouth 2 (two) times a day, Disp: 180 tablet, Rfl: 4  •  LORazepam (ATIVAN) 1 mg tablet, Take 1 tablet (1 mg total) by mouth 2 (two) times a day As needed for anxiety, Disp: 180 tablet, Rfl: 1  •  metoprolol tartrate (LOPRESSOR) 25 mg tablet, TAKE 1 TABLET TWICE A DAY, Disp: 180 tablet, Rfl: 3  •  Multiple Vitamins-Minerals (MULTIVITAMIN ADULT PO), take 1 by Oral route once, Disp: , Rfl:   •  selenium 200 mcg, take one tablet daily, Disp: , Rfl:   •  ZINC PICOLINATE PO, Take 22 mg by mouth daily  , Disp: , Rfl:   •  neomycin-polymyxin-hydrocortisone (CORTISPORIN) 0 35%-10,000 units/mL-1% otic suspension, Administer 3 drops to the right ear 4 (four) times a day for 5 days, Disp: 3 mL, Rfl: 0       ALLERGIES     Allergies   Allergen Reactions   • No Active Allergies        *-*-*-*-*-*-*-*-*-*-*-*-*-*-*-*-*-*-*-*-*-*-*-*-*-*-*-*-*-*-*-*-*-*-*-*-*-*-*-*-*-*-*-*-*-*-*-*-*-*-*-*-*-*-  LABORATORY DATA   No results found for: NA  Potassium   Date Value Ref Range Status   08/29/2022 4 3 3 5 - 5 3 mmol/L Final   02/28/2022 4 5 3 5 - 5 3 mmol/L Final   08/18/2021 4 3 3 5 - 5 3 mmol/L Final     Chloride   Date Value Ref Range Status   08/29/2022 108 96 - 108 mmol/L Final   02/28/2022 108 100 - 108 mmol/L Final   08/18/2021 108 100 - 108 mmol/L Final     CO2   Date Value Ref Range Status   08/29/2022 27 21 - 32 mmol/L Final   02/28/2022 29 21 - 32 mmol/L Final   08/18/2021 27 21 - 32 mmol/L Final     BUN   Date Value Ref Range Status   08/29/2022 17 5 - 25 mg/dL Final   02/28/2022 17 5 - 25 mg/dL Final   08/18/2021 15 5 - 25 mg/dL Final     Creatinine   Date Value Ref Range Status   08/29/2022 0 75 0 60 - 1 30 mg/dL Final     Comment:     Standardized to IDMS reference method   02/28/2022 0 79 0 60 - 1 30 mg/dL Final     Comment:     Standardized to IDMS reference method   08/18/2021 0 62 0 60 - 1 30 mg/dL Final     Comment:     Standardized to IDMS reference method     eGFR   Date Value Ref Range Status   08/29/2022 95 ml/min/1 73sq m Final   02/28/2022 93 ml/min/1 73sq m Final   08/18/2021 104 ml/min/1 73sq m Final     Calcium   Date Value Ref Range Status   08/29/2022 9 3 8 3 - 10 1 mg/dL Final   02/28/2022 9 2 8 3 - 10 1 mg/dL Final   08/18/2021 8 9 8 3 - 10 1 mg/dL Final     AST   Date Value Ref Range Status   08/29/2022 7 5 - 45 U/L Final     Comment:     Specimen collection should occur prior to Sulfasalazine administration due to the potential for falsely depressed results  02/28/2022 10 5 - 45 U/L Final     Comment:     Specimen collection should occur prior to Sulfasalazine administration due to the potential for falsely depressed results  08/18/2021 5 5 - 45 U/L Final     Comment:     Specimen collection should occur prior to Sulfasalazine administration due to the potential for falsely depressed results        ALT   Date Value Ref Range Status   08/29/2022 20 12 - 78 U/L Final     Comment:     Specimen collection should occur prior to Sulfasalazine and/or Sulfapyridine administration due to the potential for falsely depressed results  02/28/2022 25 12 - 78 U/L Final     Comment:     Specimen collection should occur prior to Sulfasalazine and/or Sulfapyridine administration due to the potential for falsely depressed results  08/18/2021 21 12 - 78 U/L Final     Comment:     Specimen collection should occur prior to Sulfasalazine and/or Sulfapyridine administration due to the potential for falsely depressed results  Alkaline Phosphatase   Date Value Ref Range Status   08/29/2022 61 46 - 116 U/L Final   02/28/2022 74 46 - 116 U/L Final   08/18/2021 82 46 - 116 U/L Final     Magnesium   Date Value Ref Range Status   08/29/2022 2 4 1 6 - 2 6 mg/dL Final   02/28/2022 2 3 1 6 - 2 6 mg/dL Final   08/18/2021 2 4 1 6 - 2 6 mg/dL Final     WBC   Date Value Ref Range Status   09/23/2022 9 70 4 31 - 10 16 Thousand/uL Final   09/12/2022 11 66 (H) 4 31 - 10 16 Thousand/uL Final   08/29/2022 10 28 (H) 4 31 - 10 16 Thousand/uL Final     Hemoglobin   Date Value Ref Range Status   09/23/2022 15 1 12 0 - 17 0 g/dL Final   09/12/2022 14 8 12 0 - 17 0 g/dL Final   08/29/2022 15 6 12 0 - 17 0 g/dL Final     Platelets   Date Value Ref Range Status   09/23/2022 328 149 - 390 Thousands/uL Final   09/12/2022 319 149 - 390 Thousands/uL Final   08/29/2022 327 149 - 390 Thousands/uL Final     No results found for: PT, PTT, INR  No results found for: CKMB, DIGOXIN  No results found for: TSH  No results found for: CHOL   Hemoglobin A1C   Date Value Ref Range Status   08/29/2022 5 9 (H) Normal 3 8-5 6%; PreDiabetic 5 7-6 4%;  Diabetic >=6 5%; Glycemic control for adults with diabetes <7 0% % Final     Urine Culture   Date Value Ref Range Status   03/03/2021 No Growth <1000 cfu/mL  Final       *-*-*-*-*-*-*-*-*-*-*-*-*-*-*-*-*-*-*-*-*-*-*-*-*-*-*-*-*-*-*-*-*-*-*-*-*-*-*-*-*-*-*-*-*-*-*-*-*-*-*-*-*-*-  PREVIOUS CARDIOLOGY & RADIOLOGY TEST RESULTS   I have personally reviewed pertinent results of cardiovascular tests noted below      Results for orders placed in visit on 18    Echo complete with contrast if indicated    Narrative  [phone] [id number] [phone]    Cardiovascular Report  _________________________________________________________________        Transthoracic Echocardiogram Report  Aminta Castelan    MRN:                 [id number]  Account :           [de-identified]  Accession :         [id number]  Exam Date:           2018 10:29  Patient Location:    O  Ordering Phys:       NANCY Robles    Arch Alondra)  Attending Phys:      NANCY Robles    (Laurence Melvin)  Technologist:        Les Simmons    Age:  58             :   1955           Gender:   M  Wt:   236 lb         Ht:    73 in  Indication:  Atrial fibrillation,   Hypertension  BP:         /       HR:    Rhythm:              sinus  Technical Quality:   good      MEASUREMENTS  2D ECHO  Body Surface Area                 2 4 m²  LV Diastolic Diameter PLAX        6 2 cm  LV Systolic Diameter PLAX         4 2 cm  IVS Diastolic Thickness           1 2 cm  LVPW Diastolic Thickness          1 1 cm  LV Relative Wall Thickness        0 4  RV Internal Dim ED PLAX           4 4 cm  Aortic Root Diameter              3 2 cm  LA Systolic Diameter LX           4 8 cm  LA Area                           27 0 cm²  LV Ejection Fraction MOD 4C       60 4 %  RA Area 4C View                   25 0 cm²    DOPPLER  MV Area PHT                       3 5 cm²  Mitral E Point Velocity           90 0 cm/s  Mitral A Point Velocity           60 6 cm/s  Mitral E to A Ratio               1 5  MV E' Velocity                    7 3 cm/s  Mitral E to MV E' Ratio           12 4  TR Peak Velocity                  272 8 cm/s  TR Peak Gradient                  29 8 mmHg  TAPSE                             2 4 cm      FINDINGS  Left Ventricle  Mildly enlarged left ventricle cavity, mild concentric left  ventricular hypertrophy, low-normal left ventricular systolic  function and wall motion  Ejection fraction is estimated at  around 50-55%  Normal diastolic function  Right Ventricle  Normal right ventricle size and systolic function  Right Atrium  Mild right atrial cavity enlargement  Left Atrium  Mild approaching moderate left atrial cavity enlargement  Intact  interatrial septum  Mitral Valve  Mild anterior mitral valve leaflet sclerosis  Trace mitral valve  regurgitation  Aortic Valve  Trileaflet aortic valve with mild sclerosis  No aortic valve  stenosis or regurgitation  Tricuspid Valve  Mild tricuspid valve regurgitation  Pulmonic Valve  Trace to mild pulmonic valve regurgitation  Pericardium  No pericardial effusion  Aorta  Aortic root and proximal ascending aorta are normal in size on  2-D imaging  Additional Findings  Inferior vena cava is normal in size and demonstrates over 50%  collapse with respirations  CONCLUSIONS  - Mild left ventricular cavity enlargement, normal left  ventricle systolic function and normal diastolic function  EF  approximately 50-55%  - Mild right atrial and mild to moderate left atrial  enlargement  - Aortic valve leaflet sclerosis  No stenosis or regurgitation   - Mitral valve leaflet sclerosis  Trace mitral valve  regurgitation   - Mild tricuspid and pulmonic valve regurgitation   - No pericardial effusion   - No pulmonary hypertension  Compared to previous echocardiogram from June 2015 there is  overall no significant change  Ather J Luis  (Electronically Signed)  Final Date:      19 June 2018 18:16  CV Report                    CECILLE OWENS       [id number]  Final                                                     Page 3    No results found for this or any previous visit  No results found for this or any previous visit  No results found for this or any previous visit      US thyroid  Narrative: THYROID ULTRASOUND    INDICATION:    E04 2: Nontoxic multinodular goiter  COMPARISON:  10/12/2021    TECHNIQUE:   Ultrasound of the thyroid was performed with a high frequency linear transducer in transverse and sagittal planes including volumetric imaging sweeps as well as traditional still imaging technique  FINDINGS:  Thyroid parenchyma is diffusely heterogeneous in echotexture  Right lobe: 7 8 x 3 0 x 3 4 cm  Volume 39 5 mL  Left lobe:  6 4 x 2 9 x 2 6 cm  Volume 22 9 mL  Isthmus: 0 6  cm  Nodule #1  Image 14  RIGHT lower pole nodule measuring 3 1 x 2 9 x 2 7 cm  Previously 3 7 x 3 1 x 3 2 cm   COMPOSITION:  0 points, spongiform  ECHOGENICITY:  1 point, hyperechoic or isoechoic  SHAPE:  0 points, wider-than-tall  MARGIN: 0 points, ill-defined  ECHOGENIC FOCI:  0 points, none or large comet-tail artifacts  TI-RADS Classification: TR 1 (0 points), Benign  No FNA  Nodule #2  Image 5  RIGHT upper pole nodule measuring 1 x 1 x 0 6 cm  Previously not measured but grossly similar  COMPOSITION:  0 points, spongiform  ECHOGENICITY:  1 point, hyperechoic or isoechoic  SHAPE:  0 points, wider-than-tall  MARGIN: 0 points, smooth  ECHOGENIC FOCI:  0 points, none or large comet-tail artifacts  TI-RADS Classification: TR 1 (0 points), Benign  No FNA  Nodule #3  Image 74 and 77 on cine images  RIGHT midgland nodule measuring 0 9 x 0 9 x 1 2 cm  Previously 0 9 x 0 9 x 1 cm   COMPOSITION:  1 point, mixed cystic and solid  ECHOGENICITY:  1 point, hyperechoic or isoechoic  SHAPE:  0 points, wider-than-tall  MARGIN: 0 points, smooth  ECHOGENIC FOCI:  0 points, none or large comet-tail artifacts  TI-RADS Classification: TR 2 (2 points), Not suspious  No FNA  Nodule #4  Image 34  LEFT upper pole nodule measuring 1 2 x 0 7 x 1 cm  Previously not measured but grossly similar  COMPOSITION:  0 points, spongiform  ECHOGENICITY:  1 point, hyperechoic or isoechoic      SHAPE:  0 points, wider-than-tall  MARGIN: 0 points, ill-defined  ECHOGENIC FOCI:  0 points, none or large comet-tail artifacts  TI-RADS Classification: TR 1 (0 points), Benign  No FNA  Nodule #5  Image 40  LEFT lower pole nodule measuring 1 8 x 1 3 x 1 2 cm  Given significant differences in measuring technique, no significant change from prior when measured similarly (image 52/64 series 1)  COMPOSITION:  1 point, mixed cystic and solid  ECHOGENICITY:  2 points, hypoechoic  SHAPE:  3 points, taller-than-wide  MARGIN: 0 points, ill-defined  ECHOGENIC FOCI:  1 point, macrocalcifications  TI-RADS Classification: TR 5 (7 or > points), Highly suspicious  FNA if > 1 cm  Follow if > 0 5 cm  Subcentimeter left mid gland cystic nodule seen on previous study is not separately measured on this examination but is grossly similar to prior when comparing cine images  Impression: Enlarged, heterogeneous thyroid gland  Multiple thyroid nodules  Significant technical difference compared to prior study  Most nodules are unchanged, however, there is a left lower pole nodule which is sonographically suspicious and should be biopsied if not already performed  Reference: ACR Thyroid Imaging, Reporting and Data System (TI-RADS): White Paper of the [employer]  J AM Alana Radiol 8404;09:151-506  (additional recommendations based on American Thyroid Association 2015 guidelines )    The study was marked in EPIC for significant notification      Workstation performed: [id number]        *-*-*-*-*-*-*-*-*-*-*-*-*-*-*-*-*-*-*-*-*-*-*-*-*-*-*-*-*-*-*-*-*-*-*-*-*-*-*-*-*-*-*-*-*-*-*-*-*-*-*-*-*-*-  SIGNATURES:   [unfilled]   Marlena Shelby MD; [facility]    *-*-*-*-*-*-*-*-*-*-*-*-*-*-*-*-*-*-*-*-*-*-*-*-*-*-*-*-*-*-*-*-*-*-*-*-*-*-*-*-*-*-*-*-*-*-*-*-*-*-*-*-*-*-  PAST MEDICAL HISTORY:  Past Medical History:   Diagnosis Date   • Atrial fibrillation ([facility] ) 06/26/2015   • Colon polyp    • HLD (hyperlipidemia)    • HTN (hypertension) • Hypertension    • Irregular heart beat    • Multiple thyroid nodules    • Positive FIT (fecal immunochemical test)    • Pre-diabetes    • Vitamin D deficiency     PAST SURGICAL HISTORY:  Past Surgical History:   Procedure Laterality Date   • CARDIOVERSION  2015   • COLONOSCOPY     • US GUIDED THYROID BIOPSY  10/16/2020         FAMILY HISTORY:  Family History   Problem Relation Age of Onset   • Heart attack Father 61        MI   • Heart attack Paternal Uncle         MI    SOCIAL HISTORY:  Social History     Tobacco Use   Smoking Status Current Every Day Smoker   • Packs/day: 0 50   • Years: 50 00   • Pack years: 25 00   • Types: Cigarettes   • Last attempt to quit: 2014   • Years since quittin 8   Smokeless Tobacco Never Used      Social History     Substance and Sexual Activity   Alcohol Use Not Currently     Social History     Substance and Sexual Activity   Drug Use Never    [unfilled]     *-*-*-*-*-*-*-*-*-*-*-*-*-*-*-*-*-*-*-*-*-*-*-*-*-*-*-*-*-*-*-*-*-*-*-*-*-*-*-*-*-*-*-*-*-*-*-*-*-*-*-*-*-*  ALLERGIES:  Allergies   Allergen Reactions   • No Active Allergies     CURRENT SCHEDULED MEDICATIONS:    Current Outpatient Medications:   •  acyclovir (ZOVIRAX) 800 mg tablet, Take 1 tablet (800 mg total) by mouth 2 (two) times a day, Disp: 180 tablet, Rfl: 1  •  amLODIPine (NORVASC) 5 mg tablet, TAKE 1 TABLET TWICE A DAY, Disp: 180 tablet, Rfl: 3  •  apixaban (Eliquis) 5 mg, Take 1 tablet (5 mg total) by mouth 2 (two) times a day, Disp: 180 tablet, Rfl: 4  •  atorvastatin (LIPITOR) 40 mg tablet, Take 1 tablet (40 mg total) by mouth daily, Disp: 90 tablet, Rfl: 4  •  Cholecalciferol (VITAMIN D3) 1000 units CAPS, Take 5,000 Units by mouth 3 (three) times a week, Disp: , Rfl:   •  Coenzyme Q10 (CO Q-10) 200 MG CAPS, Patient states that he takes this on a daily basis   , Disp: , Rfl:   •  Cyanocobalamin ER 1000 MCG TBCR, 1 daily, Disp: , Rfl:   •  flecainide (TAMBOCOR) 100 mg tablet, TAKE 1 TABLET EVERY 12 HOURS, Disp: 180 tablet, Rfl: 3  •  fluticasone (FLONASE) 50 mcg/act nasal spray, 1 spray into each nostril daily, Disp: 9 9 mL, Rfl: 0  •  lisinopril (ZESTRIL) 20 mg tablet, Take 1 tablet (20 mg total) by mouth 2 (two) times a day, Disp: 180 tablet, Rfl: 4  •  LORazepam (ATIVAN) 1 mg tablet, Take 1 tablet (1 mg total) by mouth 2 (two) times a day As needed for anxiety, Disp: 180 tablet, Rfl: 1  •  metoprolol tartrate (LOPRESSOR) 25 mg tablet, TAKE 1 TABLET TWICE A DAY, Disp: 180 tablet, Rfl: 3  •  Multiple Vitamins-Minerals (MULTIVITAMIN ADULT PO), take 1 by Oral route once, Disp: , Rfl:   •  selenium 200 mcg, take one tablet daily, Disp: , Rfl:   •  ZINC PICOLINATE PO, Take 22 mg by mouth daily  , Disp: , Rfl:   •  neomycin-polymyxin-hydrocortisone (CORTISPORIN) 0 35%-10,000 units/mL-1% otic suspension, Administer 3 drops to the right ear 4 (four) times a day for 5 days, Disp: 3 mL, Rfl: 0     *-*-*-*-*-*-*-*-*-*-*-*-*-*-*-*-*-*-*-*-*-*-*-*-*-*-*-*-*-*-*-*-*-*-*-*-*-*-*-*-*-*-*-*-*-*-*-*-*-*-*-*-*-*

## 2022-10-26 NOTE — TELEPHONE ENCOUNTER
Mr Anjum Hutton was here in the office and saw Dr Satish Lebron for a visit  Mr Anjum Hutton stated that he needs samples of eliquis due to the donut hole for insurance  3 boxes of Eliquis 5MG were given to patient  Lot# [id number]   EXP: 09/2024  DR Leland Smith  This was also logged into the sample book

## 2022-10-30 PROBLEM — Z00.00 HEALTH CARE MAINTENANCE: Status: RESOLVED | Noted: 2022-08-31 | Resolved: 2022-10-30

## 2022-10-30 PROBLEM — Z12.11 SCREENING FOR COLON CANCER: Status: RESOLVED | Noted: 2022-08-31 | Resolved: 2022-10-30

## 2022-10-30 PROBLEM — J32.9 RECURRENT SINUSITIS: Status: RESOLVED | Noted: 2022-08-31 | Resolved: 2022-10-30

## 2022-10-30 PROBLEM — Z12.5 SCREENING FOR PROSTATE CANCER: Status: RESOLVED | Noted: 2022-08-31 | Resolved: 2022-10-30

## 2022-11-30 ENCOUNTER — HOSPITAL ENCOUNTER (OUTPATIENT)
Dept: CT IMAGING | Facility: HOSPITAL | Age: 67
Discharge: HOME | End: 2022-11-30

## 2022-11-30 DIAGNOSIS — Z87.891 PERSONAL HISTORY OF NICOTINE DEPENDENCE: ICD-10-CM

## 2022-11-30 DIAGNOSIS — Z72.0 TOBACCO ABUSE: ICD-10-CM

## 2022-12-05 DIAGNOSIS — J43.2 CENTRILOBULAR EMPHYSEMA (HCC): Primary | ICD-10-CM

## 2022-12-16 ENCOUNTER — TELEPHONE (OUTPATIENT)
Dept: CARDIOLOGY CLINIC | Facility: CLINIC | Age: 67
End: 2022-12-16

## 2022-12-16 NOTE — TELEPHONE ENCOUNTER
Patient called, states he has nuclear stress test scheduled 12/27/22, the instructions mention that he may need to hold some medications  Current cardiac medications:  Amlodipine 5 mg daily, lisinopril 20 mg daily, atorvastatin 40 mg daily, apixaban 5 mg twice daily, flecainide 100 mg twice daily, metoprolol tartrate 25 mg twice daily  Please advised if patient needs to hold any medications and for how long? Patient also concerned that the stress test will make him go into atrial fib?

## 2022-12-19 NOTE — TELEPHONE ENCOUNTER
Patient called, advised I was going to discuss with Dr Yaakov Humphrey today  Patient states he will be cancelling his stress test and make appointment to discuss further  States he doesn't feel the stress test is good for him  Transferred to  to make appointment

## 2022-12-30 ENCOUNTER — HOSPITAL ENCOUNTER (OUTPATIENT)
Dept: NON INVASIVE DIAGNOSTICS | Facility: HOSPITAL | Age: 67
Discharge: HOME | End: 2022-12-30
Attending: INTERNAL MEDICINE

## 2022-12-30 VITALS
HEART RATE: 65 BPM | WEIGHT: 216 LBS | BODY MASS INDEX: 29.26 KG/M2 | SYSTOLIC BLOOD PRESSURE: 126 MMHG | HEIGHT: 72 IN | DIASTOLIC BLOOD PRESSURE: 80 MMHG

## 2022-12-30 DIAGNOSIS — I48.0 PAROXYSMAL ATRIAL FIBRILLATION (HCC): ICD-10-CM

## 2022-12-30 LAB
AORTIC ROOT: 3.1 CM
APICAL FOUR CHAMBER EJECTION FRACTION: 63 %
ASCENDING AORTA: 3.9 CM
E WAVE DECELERATION TIME: 204 MS
FRACTIONAL SHORTENING: 35 % (ref 28–44)
INTERVENTRICULAR SEPTUM IN DIASTOLE (PARASTERNAL SHORT AXIS VIEW): 0.8 CM
INTERVENTRICULAR SEPTUM: 0.8 CM (ref 0.6–1.1)
LAAS-AP2: 26.6 CM2
LAAS-AP4: 25.5 CM2
LEFT ATRIUM SIZE: 4.3 CM
LEFT INTERNAL DIMENSION IN SYSTOLE: 3.9 CM (ref 2.1–4)
LEFT VENTRICULAR INTERNAL DIMENSION IN DIASTOLE: 6 CM (ref 3.5–6)
LEFT VENTRICULAR POSTERIOR WALL IN END DIASTOLE: 1 CM
LEFT VENTRICULAR STROKE VOLUME: 116 ML
LVSV (TEICH): 116 ML
MV E'TISSUE VEL-SEP: 11 CM/S
MV PEAK A VEL: 0.57 M/S
MV PEAK E VEL: 77 CM/S
MV STENOSIS PRESSURE HALF TIME: 59 MS
MV VALVE AREA P 1/2 METHOD: 3.73 CM2
RA PRESSURE ESTIMATED: 3 MMHG
RIGHT ATRIUM AREA SYSTOLE A4C: 23.5 CM2
RIGHT VENTRICLE ID DIMENSION: 4.4 CM
RV PSP: 24 MMHG
SL CV LEFT ATRIUM LENGTH A2C: 6.1 CM
SL CV LV EF: 55
SL CV PED ECHO LEFT VENTRICLE DIASTOLIC VOLUME (MOD BIPLANE) 2D: 183 ML
SL CV PED ECHO LEFT VENTRICLE SYSTOLIC VOLUME (MOD BIPLANE) 2D: 68 ML
TR MAX PG: 21 MMHG
TR PEAK VELOCITY: 2.3 M/S
TRICUSPID VALVE PEAK REGURGITATION VELOCITY: 2.28 M/S

## 2023-01-05 ENCOUNTER — OFFICE VISIT (OUTPATIENT)
Dept: FAMILY MEDICINE CLINIC | Facility: CLINIC | Age: 68
End: 2023-01-05

## 2023-01-05 VITALS
TEMPERATURE: 97.8 F | DIASTOLIC BLOOD PRESSURE: 74 MMHG | BODY MASS INDEX: 30.48 KG/M2 | WEIGHT: 225 LBS | SYSTOLIC BLOOD PRESSURE: 122 MMHG | HEIGHT: 72 IN | HEART RATE: 74 BPM | OXYGEN SATURATION: 97 %

## 2023-01-05 DIAGNOSIS — J20.0 ACUTE BRONCHITIS DUE TO MYCOPLASMA PNEUMONIAE: ICD-10-CM

## 2023-01-05 DIAGNOSIS — R09.81 CONGESTED NOSE: Primary | ICD-10-CM

## 2023-01-05 DIAGNOSIS — I48.0 PAROXYSMAL ATRIAL FIBRILLATION (HCC): ICD-10-CM

## 2023-01-05 DIAGNOSIS — Z72.0 TOBACCO ABUSE: ICD-10-CM

## 2023-01-05 DIAGNOSIS — E66.3 OVERWEIGHT (BMI 25.0-29.9): ICD-10-CM

## 2023-01-05 DIAGNOSIS — I10 ESSENTIAL HYPERTENSION: ICD-10-CM

## 2023-01-05 DIAGNOSIS — J43.2 CENTRILOBULAR EMPHYSEMA (HCC): ICD-10-CM

## 2023-01-05 DIAGNOSIS — J30.9 ALLERGIC RHINITIS, UNSPECIFIED SEASONALITY, UNSPECIFIED TRIGGER: ICD-10-CM

## 2023-01-05 DIAGNOSIS — E27.8 ADRENAL NODULE (HCC): ICD-10-CM

## 2023-01-05 DIAGNOSIS — R05.9 COUGH, UNSPECIFIED TYPE: ICD-10-CM

## 2023-01-05 DIAGNOSIS — E04.2 MULTIPLE THYROID NODULES: ICD-10-CM

## 2023-01-05 LAB
SARS-COV-2 AG UPPER RESP QL IA: NEGATIVE
VALID CONTROL: NORMAL

## 2023-01-05 RX ORDER — DOXYCYCLINE HYCLATE 100 MG/1
100 CAPSULE ORAL EVERY 12 HOURS SCHEDULED
Qty: 20 CAPSULE | Refills: 0 | Status: SHIPPED | OUTPATIENT
Start: 2023-01-05 | End: 2023-01-15

## 2023-01-05 RX ORDER — BENZONATATE 200 MG/1
200 CAPSULE ORAL 3 TIMES DAILY PRN
Qty: 30 CAPSULE | Refills: 1 | Status: SHIPPED | OUTPATIENT
Start: 2023-01-05 | End: 2023-01-15

## 2023-01-05 RX ORDER — PREDNISONE 20 MG/1
TABLET ORAL
Qty: 20 TABLET | Refills: 0 | Status: SHIPPED | OUTPATIENT
Start: 2023-01-05 | End: 2023-01-15
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
## 2023-01-05 NOTE — PATIENT INSTRUCTIONS
Finish antibiotic  Use Tessalon as needed for cough every 8 hours   Finish prednisone 20 mg tapering dose as follows, 3 tablets daily with food for 3 days, 2 tablets daily with food for 3 days, 1 tablet daily with food for 4 days then discontinue   I recommend complete tobacco cessation   Diet exercise weight loss recommended   Return in 1 week if still symptoms

## 2023-01-18 ENCOUNTER — OFFICE VISIT (OUTPATIENT)
Dept: CARDIOLOGY CLINIC | Facility: CLINIC | Age: 68
End: 2023-01-18

## 2023-01-18 VITALS
BODY MASS INDEX: 29.76 KG/M2 | DIASTOLIC BLOOD PRESSURE: 78 MMHG | WEIGHT: 219.4 LBS | HEART RATE: 63 BPM | SYSTOLIC BLOOD PRESSURE: 120 MMHG

## 2023-01-18 DIAGNOSIS — F17.210 TOBACCO DEPENDENCE DUE TO CIGARETTES: ICD-10-CM

## 2023-01-18 DIAGNOSIS — I10 ESSENTIAL HYPERTENSION: ICD-10-CM

## 2023-01-18 DIAGNOSIS — I48.0 PAROXYSMAL ATRIAL FIBRILLATION (HCC): Primary | ICD-10-CM

## 2023-01-18 DIAGNOSIS — E78.2 MIXED HYPERLIPIDEMIA: ICD-10-CM

## 2023-01-18 DIAGNOSIS — Z79.01 LONG TERM CURRENT USE OF ANTICOAGULANT THERAPY: ICD-10-CM

## 2023-01-18 RX ORDER — FLECAINIDE ACETATE 100 MG/1
100 TABLET ORAL EVERY 12 HOURS
Qty: 180 TABLET | Refills: 3 | Status: SHIPPED | OUTPATIENT
Start: 2023-01-18

## 2023-01-18 NOTE — PROGRESS NOTES
[facility]  [address] [facility], [place]ae[place]  Phone#  [phone]  Fax#  [phone]  *-*-*-*-*-*-*-*-*-*-*-*-*-*-*-*-*-*-*-*-*-*-*-*-*-*-*-*-*-*-*-*-*-*-*-*-*-*-*-*-*-*-*-*-*-*-*-*-*-*-*-*-*-*  ENCOUNTER DATE: 01/18/23 11:34 PM  PATIENT NAME: Amy Horner   1955    [id number]  AGE:67 y o  SEX: male  ENCOUNTER PROVIDER:Carlita Dietz     PRIMARY CARE PHYSICIAN: Herminio Luna DO    DIAGNOSES:  1  Paroxysmal atrial fibrillation status post cardioversion in 2015   2  Mixed dyslipidemia  3  Benign essential hypertension  4  Chronic tobacco use  5  Suspected sleep apnea  6  Diverticular disease without diverticulitis  7  Adrenal adenoma  8  Multiple thyroid nodules    ECHOCARDIOGRAM 12/30/2022:  Mildly dilated left ventricle cavity, normal wall thickness, normal left ventricle systolic function, normal left ventricle diastolic function, EF 76%, not mildly dilated right ventricle, normal right ventricle systolic function, mild left atrial enlargement, normal right atrial size, normal aortic valve without stenosis or regurgitation, normal mitral valve without stenosis or regurgitation, trace tricuspid valve regurgitation, mild pulmonic valve regurgitation, no pericardial effusion  Mild ectasia of ascending aorta measuring up to 3 9 cm  PERSANTINE NUCLEAR STRESS TEST in July 2015 showed normal perfusion and normal left ventricular systolic function with mildly dilated left ventricular cavity, EF was 58%  ECHOCARDIOGRAM June 2018:  - Mild left ventricular cavity enlargement, normal left  ventricle systolic function and normal diastolic function  EF  approximately 50-55%  - Mild right atrial and mild to moderate left atrial  enlargement  - Aortic valve leaflet sclerosis  No stenosis or regurgitation   - Mitral valve leaflet sclerosis  Trace mitral valve  regurgitation   - Mild tricuspid and pulmonic valve regurgitation   - No pericardial effusion   - No pulmonary hypertension  TRANSESOPHAGEAL ECHOCARDIOGRAM in June 2015 showed mild-to-moderate left atrial enlargement, borderline reduced left ventricular systolic function  CURRENT ECG     Results for orders placed or performed in visit on 01/18/23   POCT ECG    Narrative    Sinus rhythm, HR 63 bpm, normal axis and intervals, QRS duration is 92 ms, QTC is 403 ms, no significant chamber hypertrophy enlargement  Sinus arrhythmia is noted  No evidence of prior infarction or current  ischemia  CARDIOLOGY ASSESSMENT & PLAN     1  Paroxysmal atrial fibrillation (HCC)  POCT ECG    CT coronary calcium score    flecainide (TAMBOCOR) 100 mg tablet      2  Mixed hyperlipidemia        3  Essential hypertension        4  Long term current use of anticoagulant therapy        5  Tobacco dependence due to cigarettes          Paroxysmal atrial fibrillation [facility])  Mr Allyson Lutz continues to maintain sinus rhythm  He does not give any symptoms that suggest recurrence of atrial fibrillation in the recent months  He is on antiarrhythmic therapy with flecainide  He has no known coronary artery disease however he has several risk factors for CAD  Physical examination does not reveal any signs of pulmonary or peripheral vascular congestion  His lipids are relatively well controlled  He is on moderate intensity statin therapy  His current 10-year ASCVD risk is around 20% putting him in high risk category  He has not yet undergone screening nuclear stress test for ischemic heart disease as he has some reservations about being exposed to radiation and about the possible side effects of regadenoson  Today I tried to reassure him that this test is relatively safe and I do not see any contraindication for him to undergo the test   He is going to consider this  -- At this time I am advising him to continue current medications    -- Considering his risk factors and have him not proceeding with nuclear stress test I am requesting for a coronary calcium score test to determine his risk category  -- I have again strongly urged him to quit smoking as this is a single major risk factor for future cardiac and cerebrovascular disease  -- Dietary and medical compliance are reinforced  -- He is advised  to report any concerning symptoms such as chest pain, shortness of breath, decline in exercise tolerance or presyncope/syncope  INTERVAL HISTORY & HISTORY OF PRESENT ILLNESS     Amarjit Hernandez is here for follow-up regarding his cardiac comorbidities which include:  Paroxysmal atrial fibrillation, hypertension, dyslipidemia and comorbidities  He was last seen by me in November 2021  Today he reports that he has been overall feeling well from cardiac perspective  He has not experienced any recent chest pain or shortness of breath or dizziness or lightheadedness or palpitations  He has not had any passing out or near passing out episodes  He reports being very active  He does report that he has been losing weight about 6-7 lb a year  He has recently been found to have thyroid nodules  He is working with endocrinologist for further evaluation  He has had no recent hospitalizations or other illnesses  Functional capacity status:  Good   (Excellent- >10 METs; Good: (7-10 METs); Moderate (4-7 METs); Poor (<= 4 METs)    Any chronic stressors:  None   (feeling of poor health, financial problems, and social isolation etc)  Tobacco or alcohol dependence:  He does not drink but he smokes about 8-10 cigarettes a day  Current cardiac medications:  Amlodipine 5 mg daily, lisinopril 20 mg daily, atorvastatin 40 mg daily, apixaban 5 mg twice daily, flecainide 100 mg twice daily, metoprolol tartrate 25 mg twice daily  Blood work from August 29, 2022:  Sodium 138, potassium 4 3, chloride 108, bicarb 27, BUN 17, creatinine 0 75, normal LFTs, NT proBNP level was 247, flecainide level was 0 19 in September    Triglyceride was 59, direct LDL was 68  REVIEW OF SYSTEMS   Positive for:  As noted above in HPI  Negative for: All remaining as reviewed below and in HPI  SYSTEM SYMPTOMS REVIEWED:  General--weight change, fever, night sweats  Respiratory--cough, wheezing, shortness of breath, sputum production  Cardiovascular--chest pain, syncope, dyspnea on exertion, edema, decline in exercise tolerance, claudication   Gastrointestinal--persistent vomiting, diarrhea, abdominal distention, blood in stool   Muscular or skeletal--joint pain or swelling   Neurologic--headaches, syncope, abnormal movement  Hematologic--history of easy bruising and bleeding   Endocrine--thyroid enlargement, heat or cold intolerance, polyuria   Psychiatric--anxiety, depression     *-*-*-*-*-*-*-*-*-*-*-*-*-*-*-*-*-*-*-*-*-*-*-*-*-*-*-*-*-*-*-*-*-*-*-*-*-*-*-*-*-*-*-*-*-*-*-*-*-*-*-*-*-*-  VITAL SIGNS     CURRENT VITAL SIGNS:   Vitals:    01/18/23 0810   BP: 120/78   BP Location: Left arm   Patient Position: Sitting   Cuff Size: Large   Pulse: 63   Weight: 99 5 kg (219 lb 6 4 oz)       BMI: Body mass index is 29 76 kg/m²  WEIGHTS:   Wt Readings from Last 25 Encounters:   01/18/23 99 5 kg (219 lb 6 4 oz)   01/05/23 102 kg (225 lb)   12/30/22 98 kg (216 lb)   10/26/22 98 3 kg (216 lb 12 8 oz)   08/31/22 98 4 kg (217 lb)   11/23/21 102 kg (225 lb)   08/26/21 101 kg (222 lb)   11/19/20 98 kg (216 lb)   11/17/20 98 9 kg (218 lb)   10/15/20 98 9 kg (218 lb)   08/12/20 97 8 kg (215 lb 9 6 oz)   11/08/19 102 kg (225 lb)   07/22/19 99 8 kg (220 lb)   03/19/19 105 kg (230 lb 9 6 oz)   09/26/18 102 kg (225 lb)      *-*-*-*-*-*-*-*-*-*-*-*-*-*-*-*-*-*-*-*-*-*-*-*-*-*-*-*-*-*-*-*-*-*-*-*-*-*-*-*-*-*-*-*-*-*-*-*-*-*-*-*-*-*-  PHYSICAL EXAM     General Appearance:    Alert, cooperative, no distress, appears stated age   Head, Eyes, ENT:    No obvious abnormality, moist mucous mebranes  Neck:   Supple, no carotid bruit or JVD   Back:     Symmetric, no curvature     Lungs: Respirations unlabored  Clear to auscultation bilaterally,    Chest wall:    No tenderness or deformity   Heart:    Regular rate and rhythm, S1 and S2 normal, no murmur, rub  or gallop  Abdomen:     Soft, non-tender, No obvious masses, or organomegaly   Extremities:   Extremities warm, no cyanosis or edema    Skin:   to mild varicosities at venostatic changes in lower extremities  Normal skin color, texture, and turgor  No rashes or lesions     *-*-*-*-*-*-*-*-*-*-*-*-*-*-*-*-*-*-*-*-*-*-*-*-*-*-*-*-*-*-*-*-*-*-*-*-*-*-*-*-*-*-*-*-*-*-*-*-*-*-*-*-*-*-  CURRENT MEDICATIONS LIST     Current Outpatient Medications:   •  acyclovir (ZOVIRAX) 800 mg tablet, Take 1 tablet (800 mg total) by mouth 2 (two) times a day (Patient taking differently: Take 800 mg by mouth if needed), Disp: 180 tablet, Rfl: 1  •  amLODIPine (NORVASC) 5 mg tablet, TAKE 1 TABLET TWICE A DAY, Disp: 180 tablet, Rfl: 3  •  apixaban (Eliquis) 5 mg, Take 1 tablet (5 mg total) by mouth 2 (two) times a day, Disp: 180 tablet, Rfl: 4  •  atorvastatin (LIPITOR) 40 mg tablet, TAKE 1 TABLET DAILY, Disp: 90 tablet, Rfl: 3  •  Cholecalciferol (VITAMIN D3) 1000 units CAPS, Take 5,000 Units by mouth 3 (three) times a week, Disp: , Rfl:   •  Coenzyme Q10 (CO Q-10) 200 MG CAPS, Patient states that he takes this on a daily basis   , Disp: , Rfl:   •  Cyanocobalamin ER 1000 MCG TBCR, 1 daily, Disp: , Rfl:   •  flecainide (TAMBOCOR) 100 mg tablet, Take 1 tablet (100 mg total) by mouth every 12 (twelve) hours, Disp: 180 tablet, Rfl: 3  •  fluticasone (FLONASE) 50 mcg/act nasal spray, 1 spray into each nostril daily, Disp: 9 9 mL, Rfl: 0  •  lisinopril (ZESTRIL) 20 mg tablet, TAKE 1 TABLET TWICE A DAY, Disp: 180 tablet, Rfl: 3  •  LORazepam (ATIVAN) 1 mg tablet, Take 1 tablet (1 mg total) by mouth 2 (two) times a day As needed for anxiety, Disp: 180 tablet, Rfl: 1  •  metoprolol tartrate (LOPRESSOR) 25 mg tablet, TAKE 1 TABLET TWICE A DAY, Disp: 180 tablet, Rfl: 3  • Multiple Vitamins-Minerals (MULTIVITAMIN ADULT PO), take 1 by Oral route once, Disp: , Rfl:   •  selenium 200 mcg, take one tablet daily, Disp: , Rfl:   •  neomycin-polymyxin-hydrocortisone (CORTISPORIN) 0 35%-10,000 units/mL-1% otic suspension, Administer 3 drops to the right ear 4 (four) times a day for 5 days, Disp: 3 mL, Rfl: 0  •  ZINC PICOLINATE PO, Take 22 mg by mouth daily   (Patient not taking: Reported on 1/18/2023), Disp: , Rfl:        ALLERGIES     Allergies   Allergen Reactions   • No Active Allergies        *-*-*-*-*-*-*-*-*-*-*-*-*-*-*-*-*-*-*-*-*-*-*-*-*-*-*-*-*-*-*-*-*-*-*-*-*-*-*-*-*-*-*-*-*-*-*-*-*-*-*-*-*-*-  LABORATORY DATA   No results found for: NA  Potassium   Date Value Ref Range Status   08/29/2022 4 3 3 5 - 5 3 mmol/L Final   02/28/2022 4 5 3 5 - 5 3 mmol/L Final   08/18/2021 4 3 3 5 - 5 3 mmol/L Final     Chloride   Date Value Ref Range Status   08/29/2022 108 96 - 108 mmol/L Final   02/28/2022 108 100 - 108 mmol/L Final   08/18/2021 108 100 - 108 mmol/L Final     CO2   Date Value Ref Range Status   08/29/2022 27 21 - 32 mmol/L Final   02/28/2022 29 21 - 32 mmol/L Final   08/18/2021 27 21 - 32 mmol/L Final     BUN   Date Value Ref Range Status   08/29/2022 17 5 - 25 mg/dL Final   02/28/2022 17 5 - 25 mg/dL Final   08/18/2021 15 5 - 25 mg/dL Final     Creatinine   Date Value Ref Range Status   08/29/2022 0 75 0 60 - 1 30 mg/dL Final     Comment:     Standardized to IDMS reference method   02/28/2022 0 79 0 60 - 1 30 mg/dL Final     Comment:     Standardized to IDMS reference method   08/18/2021 0 62 0 60 - 1 30 mg/dL Final     Comment:     Standardized to IDMS reference method     eGFR   Date Value Ref Range Status   08/29/2022 95 ml/min/1 73sq m Final   02/28/2022 93 ml/min/1 73sq m Final   08/18/2021 104 ml/min/1 73sq m Final     Calcium   Date Value Ref Range Status   08/29/2022 9 3 8 3 - 10 1 mg/dL Final   02/28/2022 9 2 8 3 - 10 1 mg/dL Final   08/18/2021 8 9 8 3 - 10 1 mg/dL Final AST   Date Value Ref Range Status   08/29/2022 7 5 - 45 U/L Final     Comment:     Specimen collection should occur prior to Sulfasalazine administration due to the potential for falsely depressed results  02/28/2022 10 5 - 45 U/L Final     Comment:     Specimen collection should occur prior to Sulfasalazine administration due to the potential for falsely depressed results  08/18/2021 5 5 - 45 U/L Final     Comment:     Specimen collection should occur prior to Sulfasalazine administration due to the potential for falsely depressed results  ALT   Date Value Ref Range Status   08/29/2022 20 12 - 78 U/L Final     Comment:     Specimen collection should occur prior to Sulfasalazine and/or Sulfapyridine administration due to the potential for falsely depressed results  02/28/2022 25 12 - 78 U/L Final     Comment:     Specimen collection should occur prior to Sulfasalazine and/or Sulfapyridine administration due to the potential for falsely depressed results  08/18/2021 21 12 - 78 U/L Final     Comment:     Specimen collection should occur prior to Sulfasalazine and/or Sulfapyridine administration due to the potential for falsely depressed results        Alkaline Phosphatase   Date Value Ref Range Status   08/29/2022 61 46 - 116 U/L Final   02/28/2022 74 46 - 116 U/L Final   08/18/2021 82 46 - 116 U/L Final     Magnesium   Date Value Ref Range Status   08/29/2022 2 4 1 6 - 2 6 mg/dL Final   02/28/2022 2 3 1 6 - 2 6 mg/dL Final   08/18/2021 2 4 1 6 - 2 6 mg/dL Final     WBC   Date Value Ref Range Status   09/23/2022 9 70 4 31 - 10 16 Thousand/uL Final   09/12/2022 11 66 (H) 4 31 - 10 16 Thousand/uL Final   08/29/2022 10 28 (H) 4 31 - 10 16 Thousand/uL Final     Hemoglobin   Date Value Ref Range Status   09/23/2022 15 1 12 0 - 17 0 g/dL Final   09/12/2022 14 8 12 0 - 17 0 g/dL Final   08/29/2022 15 6 12 0 - 17 0 g/dL Final     Platelets   Date Value Ref Range Status   09/23/2022 328 149 - 390 Thousands/uL Final   2022 319 149 - 390 Thousands/uL Final   2022 327 149 - 390 Thousands/uL Final     No results found for: PT, PTT, INR  No results found for: CKMB, DIGOXIN  No results found for: TSH  No results found for: CHOL   Hemoglobin A1C   Date Value Ref Range Status   2022 5 9 (H) Normal 3 8-5 6%; PreDiabetic 5 7-6 4%; Diabetic >=6 5%; Glycemic control for adults with diabetes <7 0% % Final     Urine Culture   Date Value Ref Range Status   2021 No Growth <1000 cfu/mL  Final       *-*-*-*-*-*-*-*-*-*-*-*-*-*-*-*-*-*-*-*-*-*-*-*-*-*-*-*-*-*-*-*-*-*-*-*-*-*-*-*-*-*-*-*-*-*-*-*-*-*-*-*-*-*-  PREVIOUS CARDIOLOGY & RADIOLOGY TEST RESULTS   I have personally reviewed pertinent results of cardiovascular tests noted below      Results for orders placed in visit on 18    Echo complete with contrast if indicated    Narrative  [phone] [facility]  [address] , [phone]    Cardiovascular Report  _________________________________________________________________        Transthoracic Echocardiogram Report  Yfn Skinner    MRN:                 [id number]  Account :           [de-identified]  Accession :         [id number]  Exam Date:           2018 10:29  Patient Location:    O  Ordering Phys:       NANCY Negro  Attending Phys:      NANCY Negro (Karey Shown)  Technologist:        Kathe Old    Age:  58             :   1955           Gender:   M  Wt:   236 lb         Ht:    73 in  Indication:  Atrial fibrillation,   Hypertension  BP:         /       HR:    Rhythm:              sinus  Technical Quality:   good      MEASUREMENTS  2D ECHO  Body Surface Area                 2 4 m²  LV Diastolic Diameter PLAX        6 2 cm  LV Systolic Diameter PLAX         4 2 cm  IVS Diastolic Thickness           1 2 cm  LVPW Diastolic Thickness          1 1 cm  LV Relative Wall Thickness        0 4  RV Internal Dim ED PLAX 4 4 cm  Aortic Root Diameter              3 2 cm  LA Systolic Diameter LX           4 8 cm  LA Area                           27 0 cm²  LV Ejection Fraction MOD 4C       60 4 %  RA Area 4C View                   25 0 cm²    DOPPLER  MV Area PHT                       3 5 cm²  Mitral E Point Velocity           90 0 cm/s  Mitral A Point Velocity           60 6 cm/s  Mitral E to A Ratio               1 5  MV E' Velocity                    7 3 cm/s  Mitral E to MV E' Ratio           12 4  TR Peak Velocity                  272 8 cm/s  TR Peak Gradient                  29 8 mmHg  TAPSE                             2 4 cm      FINDINGS  Left Ventricle  Mildly enlarged left ventricle cavity, mild concentric left  ventricular hypertrophy, low-normal left ventricular systolic  function and wall motion  Ejection fraction is estimated at  around 50-55%  Normal diastolic function  Right Ventricle  Normal right ventricle size and systolic function  Right Atrium  Mild right atrial cavity enlargement  Left Atrium  Mild approaching moderate left atrial cavity enlargement  Intact  interatrial septum  Mitral Valve  Mild anterior mitral valve leaflet sclerosis  Trace mitral valve  regurgitation  Aortic Valve  Trileaflet aortic valve with mild sclerosis  No aortic valve  stenosis or regurgitation  Tricuspid Valve  Mild tricuspid valve regurgitation  Pulmonic Valve  Trace to mild pulmonic valve regurgitation  Pericardium  No pericardial effusion  Aorta  Aortic root and proximal ascending aorta are normal in size on  2-D imaging  Additional Findings  Inferior vena cava is normal in size and demonstrates over 50%  collapse with respirations  CONCLUSIONS  - Mild left ventricular cavity enlargement, normal left  ventricle systolic function and normal diastolic function  EF  approximately 50-55%  - Mild right atrial and mild to moderate left atrial  enlargement  - Aortic valve leaflet sclerosis   No stenosis or regurgitation   - Mitral valve leaflet sclerosis  Trace mitral valve  regurgitation   - Mild tricuspid and pulmonic valve regurgitation   - No pericardial effusion   - No pulmonary hypertension  Compared to previous echocardiogram from June 2015 there is  overall no significant change  Carlita Dietz  (Electronically Signed)  Final Date:      19 June 2018 18:16  CV Report                    CECILLE OWENS       [id number]  Final                                                     Page 3    No results found for this or any previous visit  No results found for this or any previous visit  No results found for this or any previous visit  Echo complete w/ contrast if indicated  •  Left Ventricle: Left ventricular cavity size is mildly dilated  Wall   thickness is normal  The left ventricular ejection fraction is 55% by   visual estimation  Systolic function is normal  Wall motion is normal    Diastolic function is normal   •  Right Ventricle: Right ventricular cavity size is mildly dilated  •  Left Atrium: The atrium is mildly dilated  •  Pulmonic Valve: There is mild regurgitation  •  Aorta: The aortic root is normal in size  The ascending aorta is mildly   dilated   The ascending aorta is 3 9 cm         *-*-*-*-*-*-*-*-*-*-*-*-*-*-*-*-*-*-*-*-*-*-*-*-*-*-*-*-*-*-*-*-*-*-*-*-*-*-*-*-*-*-*-*-*-*-*-*-*-*-*-*-*-*-  SIGNATURES:   @SIG@   Carlita Dietz MD; MHA    *-*-*-*-*-*-*-*-*-*-*-*-*-*-*-*-*-*-*-*-*-*-*-*-*-*-*-*-*-*-*-*-*-*-*-*-*-*-*-*-*-*-*-*-*-*-*-*-*-*-*-*-*-*-  PAST MEDICAL HISTORY:  Past Medical History:   Diagnosis Date   • Atrial fibrillation ([address] ) 06/26/2015   • Colon polyp    • HLD (hyperlipidemia)    • HTN (hypertension)    • Hypertension    • Irregular heart beat    • Multiple thyroid nodules    • Positive FIT (fecal immunochemical test)    • Pre-diabetes    • Vitamin D deficiency     PAST SURGICAL HISTORY:  Past Surgical History:   Procedure Laterality Date   • CARDIOVERSION 2015   • COLONOSCOPY     • US GUIDED THYROID BIOPSY  10/16/2020         FAMILY HISTORY:  Family History   Problem Relation Age of Onset   • Heart attack Father 61        MI   • Heart attack Paternal Uncle         MI    SOCIAL HISTORY:  Social History     Tobacco Use   Smoking Status Every Day   • Packs/day: 0 50   • Years: 50 00   • Pack years: 25 00   • Types: Cigarettes   • Last attempt to quit: 2014   • Years since quittin 0   Smokeless Tobacco Never      Social History     Substance and Sexual Activity   Alcohol Use Not Currently     Social History     Substance and Sexual Activity   Drug Use Never    [unfilled]     *-*-*-*-*-*-*-*-*-*-*-*-*-*-*-*-*-*-*-*-*-*-*-*-*-*-*-*-*-*-*-*-*-*-*-*-*-*-*-*-*-*-*-*-*-*-*-*-*-*-*-*-*-*  ALLERGIES:  Allergies   Allergen Reactions   • No Active Allergies     CURRENT SCHEDULED MEDICATIONS:    Current Outpatient Medications:   •  acyclovir (ZOVIRAX) 800 mg tablet, Take 1 tablet (800 mg total) by mouth 2 (two) times a day (Patient taking differently: Take 800 mg by mouth if needed), Disp: 180 tablet, Rfl: 1  •  amLODIPine (NORVASC) 5 mg tablet, TAKE 1 TABLET TWICE A DAY, Disp: 180 tablet, Rfl: 3  •  apixaban (Eliquis) 5 mg, Take 1 tablet (5 mg total) by mouth 2 (two) times a day, Disp: 180 tablet, Rfl: 4  •  atorvastatin (LIPITOR) 40 mg tablet, TAKE 1 TABLET DAILY, Disp: 90 tablet, Rfl: 3  •  Cholecalciferol (VITAMIN D3) 1000 units CAPS, Take 5,000 Units by mouth 3 (three) times a week, Disp: , Rfl:   •  Coenzyme Q10 (CO Q-10) 200 MG CAPS, Patient states that he takes this on a daily basis   , Disp: , Rfl:   •  Cyanocobalamin ER 1000 MCG TBCR, 1 daily, Disp: , Rfl:   •  flecainide (TAMBOCOR) 100 mg tablet, Take 1 tablet (100 mg total) by mouth every 12 (twelve) hours, Disp: 180 tablet, Rfl: 3  •  fluticasone (FLONASE) 50 mcg/act nasal spray, 1 spray into each nostril daily, Disp: 9 9 mL, Rfl: 0  •  lisinopril (ZESTRIL) 20 mg tablet, TAKE 1 TABLET TWICE A DAY, Disp: 180 tablet, Rfl: 3  •  LORazepam (ATIVAN) 1 mg tablet, Take 1 tablet (1 mg total) by mouth 2 (two) times a day As needed for anxiety, Disp: 180 tablet, Rfl: 1  •  metoprolol tartrate (LOPRESSOR) 25 mg tablet, TAKE 1 TABLET TWICE A DAY, Disp: 180 tablet, Rfl: 3  •  Multiple Vitamins-Minerals (MULTIVITAMIN ADULT PO), take 1 by Oral route once, Disp: , Rfl:   •  selenium 200 mcg, take one tablet daily, Disp: , Rfl:   •  neomycin-polymyxin-hydrocortisone (CORTISPORIN) 0 35%-10,000 units/mL-1% otic suspension, Administer 3 drops to the right ear 4 (four) times a day for 5 days, Disp: 3 mL, Rfl: 0  •  ZINC PICOLINATE PO, Take 22 mg by mouth daily   (Patient not taking: Reported on 1/18/2023), Disp: , Rfl:      *-*-*-*-*-*-*-*-*-*-*-*-*-*-*-*-*-*-*-*-*-*-*-*-*-*-*-*-*-*-*-*-*-*-*-*-*-*-*-*-*-*-*-*-*-*-*-*-*-*-*-*-*-*

## 2023-01-19 NOTE — ASSESSMENT & PLAN NOTE
Mr Bettie Tong continues to maintain sinus rhythm  He does not give any symptoms that suggest recurrence of atrial fibrillation in the recent months  He is on antiarrhythmic therapy with flecainide  He has no known coronary artery disease however he has several risk factors for CAD  Physical examination does not reveal any signs of pulmonary or peripheral vascular congestion  His lipids are relatively well controlled  He is on moderate intensity statin therapy  His current 10-year ASCVD risk is around 20% putting him in high risk category  He has not yet undergone screening nuclear stress test for ischemic heart disease as he has some reservations about being exposed to radiation and about the possible side effects of regadenoson  Today I tried to reassure him that this test is relatively safe and I do not see any contraindication for him to undergo the test   He is going to consider this  -- At this time I am advising him to continue current medications  -- Considering his risk factors and have him not proceeding with nuclear stress test I am requesting for a coronary calcium score test to determine his risk category  -- I have again strongly urged him to quit smoking as this is a single major risk factor for future cardiac and cerebrovascular disease  -- Dietary and medical compliance are reinforced  -- He is advised  to report any concerning symptoms such as chest pain, shortness of breath, decline in exercise tolerance or presyncope/syncope

## 2023-02-06 ENCOUNTER — HOSPITAL ENCOUNTER (OUTPATIENT)
Dept: CT IMAGING | Facility: HOSPITAL | Age: 68
Discharge: HOME | End: 2023-02-06
Attending: INTERNAL MEDICINE

## 2023-02-06 DIAGNOSIS — I48.0 PAROXYSMAL ATRIAL FIBRILLATION (HCC): ICD-10-CM

## 2023-02-08 ENCOUNTER — TELEPHONE (OUTPATIENT)
Dept: CARDIOLOGY CLINIC | Facility: CLINIC | Age: 68
End: 2023-02-08

## 2023-02-08 DIAGNOSIS — I48.0 PAROXYSMAL ATRIAL FIBRILLATION (HCC): ICD-10-CM

## 2023-02-08 DIAGNOSIS — I10 ESSENTIAL HYPERTENSION, BENIGN: ICD-10-CM

## 2023-02-08 RX ORDER — AMLODIPINE BESYLATE 5 MG/1
TABLET ORAL
Qty: 180 TABLET | Refills: 3 | Status: SHIPPED | OUTPATIENT
Start: 2023-02-08

## 2023-02-08 NOTE — TELEPHONE ENCOUNTER
Pt called asking for samples of Eliquis 5 mg   samples provided 3 weeks  Lot [id number]   Exp jan 2025  Pt to  states will be ordering from pharmacy

## 2023-02-09 ENCOUNTER — TELEPHONE (OUTPATIENT)
Dept: CARDIOLOGY CLINIC | Facility: CLINIC | Age: 68
End: 2023-02-09

## 2023-02-10 ENCOUNTER — TELEPHONE (OUTPATIENT)
Dept: CARDIOLOGY CLINIC | Facility: CLINIC | Age: 68
End: 2023-02-10

## 2023-02-10 NOTE — TELEPHONE ENCOUNTER
Called patient and spoke to him about the results of the coronary calcium scoring which suggest elevate calcium levels especially in the right coronary artery  Is also noted to have thoracic aortic aneurysm measuring up to 4 3 cm  I advised him that these findings increase the need for doing the nuclear stress test to rule out ischemic heart disease  In addition these findings also reemphasized that he needs to quit smoking as smoking is his biggest risk factor for coronary artery disease as well as complication  from his aortic aneurysm  After discussion he agrees that he will get the nuclear stress test as it was ordered by me earlier  He has the prescription for the nuclear stress test and he will reach out to patient scheduling      Leatha Fernandes MD

## 2023-02-16 ENCOUNTER — TELEPHONE (OUTPATIENT)
Dept: FAMILY MEDICINE CLINIC | Facility: CLINIC | Age: 68
End: 2023-02-16

## 2023-02-16 NOTE — TELEPHONE ENCOUNTER
Patient called requesting an antibiotic for his sinus infection sent to [facility] please advise

## 2023-02-16 NOTE — TELEPHONE ENCOUNTER
Patient called back he is going to wait until tomorrow to see if he is any better  If not he will schedule an appointment then

## 2023-02-16 NOTE — TELEPHONE ENCOUNTER
Pt was seen back on 1/5/23 for symptoms, please review and advise  Does pt need another f/u appointment in regards to this for antibiotics?

## 2023-02-28 ENCOUNTER — TELEPHONE (OUTPATIENT)
Dept: FAMILY MEDICINE CLINIC | Facility: CLINIC | Age: 68
End: 2023-02-28

## 2023-02-28 DIAGNOSIS — E04.2 MULTIPLE THYROID NODULES: ICD-10-CM

## 2023-02-28 DIAGNOSIS — E27.8 ADRENAL NODULE (HCC): Primary | ICD-10-CM

## 2023-02-28 DIAGNOSIS — Z13.21 ENCOUNTER FOR VITAMIN DEFICIENCY SCREENING: ICD-10-CM

## 2023-02-28 DIAGNOSIS — E55.9 VITAMIN D INSUFFICIENCY: ICD-10-CM

## 2023-02-28 NOTE — TELEPHONE ENCOUNTER
Patient called he would like Dr Mike Arthur to order a PSA and Vitamin D  His insurance will cover this twice a year  He would like to go on Thursday

## 2023-03-01 NOTE — TELEPHONE ENCOUNTER
Patient notified with Dr Elyssa Ortiz recommendations  Patient did clarify that his insurance will cover his PSA more than once a year wether is screening or diagnostics  Patient is going tomorrow to have blood work done, patient is requesting for his PSA order to be placed as well

## 2023-03-01 NOTE — TELEPHONE ENCOUNTER
Today is only covered for screening once a year he had this in 8/22 he would not be due for until 8/23  I am placing order for vitamin D level    Patient should complete the blood work that was ordered in August for the patient and make an appointment in the next few weeks for routine follow-up

## 2023-03-01 NOTE — TELEPHONE ENCOUNTER
no Medicare only covers it once a year in addition [address] only recommended doing once early    I will not order PSA twice a year for screening

## 2023-03-02 ENCOUNTER — APPOINTMENT (OUTPATIENT)
Dept: LAB | Facility: CLINIC | Age: 68
End: 2023-03-02

## 2023-03-02 DIAGNOSIS — Z12.5 SCREENING FOR PROSTATE CANCER: ICD-10-CM

## 2023-03-02 DIAGNOSIS — Z72.0 TOBACCO ABUSE: ICD-10-CM

## 2023-03-02 DIAGNOSIS — E27.8 ADRENAL NODULE (HCC): ICD-10-CM

## 2023-03-02 DIAGNOSIS — R79.89 ELEVATED BRAIN NATRIURETIC PEPTIDE (BNP) LEVEL: ICD-10-CM

## 2023-03-02 DIAGNOSIS — Z00.00 HEALTH CARE MAINTENANCE: ICD-10-CM

## 2023-03-02 DIAGNOSIS — I48.0 PAROXYSMAL ATRIAL FIBRILLATION (HCC): ICD-10-CM

## 2023-03-02 DIAGNOSIS — Z02.89 MEDICATION MANAGEMENT CONTRACT AGREEMENT: ICD-10-CM

## 2023-03-02 DIAGNOSIS — R73.9 HYPERGLYCEMIA: ICD-10-CM

## 2023-03-02 DIAGNOSIS — J32.9 RECURRENT SINUSITIS: ICD-10-CM

## 2023-03-02 DIAGNOSIS — Z79.01 LONG TERM CURRENT USE OF ANTICOAGULANT THERAPY: ICD-10-CM

## 2023-03-02 DIAGNOSIS — F41.1 ANXIETY STATE: ICD-10-CM

## 2023-03-02 DIAGNOSIS — F41.9 ANXIETY: ICD-10-CM

## 2023-03-02 DIAGNOSIS — I10 ESSENTIAL HYPERTENSION: ICD-10-CM

## 2023-03-02 DIAGNOSIS — E78.2 MIXED HYPERLIPIDEMIA: ICD-10-CM

## 2023-03-02 DIAGNOSIS — Z13.21 ENCOUNTER FOR VITAMIN DEFICIENCY SCREENING: ICD-10-CM

## 2023-03-02 DIAGNOSIS — Z87.891 PERSONAL HISTORY OF NICOTINE DEPENDENCE: ICD-10-CM

## 2023-03-02 DIAGNOSIS — E55.9 VITAMIN D INSUFFICIENCY: ICD-10-CM

## 2023-03-02 DIAGNOSIS — E04.2 MULTIPLE THYROID NODULES: ICD-10-CM

## 2023-03-02 DIAGNOSIS — Z11.59 NEED FOR HEPATITIS C SCREENING TEST: ICD-10-CM

## 2023-03-02 DIAGNOSIS — D72.829 LEUKOCYTOSIS, UNSPECIFIED TYPE: ICD-10-CM

## 2023-03-02 DIAGNOSIS — Z12.11 SCREENING FOR COLON CANCER: ICD-10-CM

## 2023-03-02 DIAGNOSIS — E66.3 OVERWEIGHT (BMI 25.0-29.9): ICD-10-CM

## 2023-03-02 DIAGNOSIS — Z79.899 MEDICATION MANAGEMENT: ICD-10-CM

## 2023-03-02 LAB
25(OH)D3 SERPL-MCNC: 56.6 NG/ML (ref 30–100)
ALBUMIN SERPL BCP-MCNC: 3.8 G/DL (ref 3.5–5)
ALP SERPL-CCNC: 69 U/L (ref 46–116)
ALT SERPL W P-5'-P-CCNC: 21 U/L (ref 12–78)
ANION GAP SERPL CALCULATED.3IONS-SCNC: 2 MMOL/L (ref 4–13)
AST SERPL W P-5'-P-CCNC: 11 U/L (ref 5–45)
BACTERIA UR QL AUTO: ABNORMAL /HPF
BILIRUB SERPL-MCNC: 0.77 MG/DL (ref 0.2–1)
BILIRUB UR QL STRIP: NEGATIVE
BUN SERPL-MCNC: 21 MG/DL (ref 5–25)
CALCIUM SERPL-MCNC: 9.3 MG/DL (ref 8.3–10.1)
CHLORIDE SERPL-SCNC: 107 MMOL/L (ref 96–108)
CHOLEST SERPL-MCNC: 107 MG/DL
CLARITY UR: CLEAR
CO2 SERPL-SCNC: 27 MMOL/L (ref 21–32)
COLOR UR: YELLOW
CREAT SERPL-MCNC: 0.85 MG/DL (ref 0.6–1.3)
EST. AVERAGE GLUCOSE BLD GHB EST-MCNC: 123 MG/DL
GFR SERPL CREATININE-BSD FRML MDRD: 90 ML/MIN/1.73SQ M
GLUCOSE P FAST SERPL-MCNC: 92 MG/DL (ref 65–99)
GLUCOSE UR STRIP-MCNC: NEGATIVE MG/DL
HBA1C MFR BLD: 5.9 %
HCV AB SER QL: NORMAL
HDLC SERPL-MCNC: 27 MG/DL
HGB UR QL STRIP.AUTO: NEGATIVE
KETONES UR STRIP-MCNC: NEGATIVE MG/DL
LDLC SERPL CALC-MCNC: 65 MG/DL (ref 0–100)
LEUKOCYTE ESTERASE UR QL STRIP: NEGATIVE
MUCOUS THREADS UR QL AUTO: ABNORMAL
NITRITE UR QL STRIP: NEGATIVE
NON-SQ EPI CELLS URNS QL MICRO: ABNORMAL /HPF
PH UR STRIP.AUTO: 7 [PH]
POTASSIUM SERPL-SCNC: 4.2 MMOL/L (ref 3.5–5.3)
PROT SERPL-MCNC: 7.5 G/DL (ref 6.4–8.4)
PROT UR STRIP-MCNC: ABNORMAL MG/DL
RBC #/AREA URNS AUTO: ABNORMAL /HPF
SODIUM SERPL-SCNC: 136 MMOL/L (ref 135–147)
SP GR UR STRIP.AUTO: 1.02 (ref 1–1.03)
TRIGL SERPL-MCNC: 74 MG/DL
TSH SERPL DL<=0.05 MIU/L-ACNC: 0.68 UIU/ML (ref 0.45–4.5)
UROBILINOGEN UR STRIP-ACNC: <2 MG/DL
WBC #/AREA URNS AUTO: ABNORMAL /HPF

## 2023-03-03 ENCOUNTER — TELEPHONE (OUTPATIENT)
Dept: FAMILY MEDICINE CLINIC | Facility: CLINIC | Age: 68
End: 2023-03-03

## 2023-03-03 DIAGNOSIS — I48.0 PAROXYSMAL ATRIAL FIBRILLATION (HCC): Primary | ICD-10-CM

## 2023-03-03 LAB
BASOPHILS # BLD AUTO: 0.07 THOUSANDS/ÂΜL (ref 0–0.1)
BASOPHILS NFR BLD AUTO: 1 % (ref 0–1)
EOSINOPHIL # BLD AUTO: 0.21 THOUSAND/ÂΜL (ref 0–0.61)
EOSINOPHIL NFR BLD AUTO: 2 % (ref 0–6)
ERYTHROCYTE [DISTWIDTH] IN BLOOD BY AUTOMATED COUNT: 13.4 % (ref 11.6–15.1)
HCT VFR BLD AUTO: 48.4 % (ref 36.5–49.3)
HGB BLD-MCNC: 15.8 G/DL (ref 12–17)
IMM GRANULOCYTES # BLD AUTO: 0.07 THOUSAND/UL (ref 0–0.2)
IMM GRANULOCYTES NFR BLD AUTO: 1 % (ref 0–2)
LYMPHOCYTES # BLD AUTO: 2.38 THOUSANDS/ÂΜL (ref 0.6–4.47)
LYMPHOCYTES NFR BLD AUTO: 21 % (ref 14–44)
MCH RBC QN AUTO: 30.9 PG (ref 26.8–34.3)
MCHC RBC AUTO-ENTMCNC: 32.6 G/DL (ref 31.4–37.4)
MCV RBC AUTO: 95 FL (ref 82–98)
MONOCYTES # BLD AUTO: 0.82 THOUSAND/ÂΜL (ref 0.17–1.22)
MONOCYTES NFR BLD AUTO: 7 % (ref 4–12)
NEUTROPHILS # BLD AUTO: 7.72 THOUSANDS/ÂΜL (ref 1.85–7.62)
NEUTS SEG NFR BLD AUTO: 68 % (ref 43–75)
NRBC BLD AUTO-RTO: 0 /100 WBCS
PLATELET # BLD AUTO: 402 THOUSANDS/UL (ref 149–390)
PMV BLD AUTO: 9.7 FL (ref 8.9–12.7)
RBC # BLD AUTO: 5.12 MILLION/UL (ref 3.88–5.62)
WBC # BLD AUTO: 11.27 THOUSAND/UL (ref 4.31–10.16)

## 2023-03-03 NOTE — TELEPHONE ENCOUNTER
Spoke to patient he was advise of Dr Brett Saab recommendations to schedule follow up visit to discuss results   [facility] was also called to add CBC differential

## 2023-03-06 ENCOUNTER — OFFICE VISIT (OUTPATIENT)
Dept: FAMILY MEDICINE CLINIC | Facility: CLINIC | Age: 68
End: 2023-03-06

## 2023-03-06 VITALS
SYSTOLIC BLOOD PRESSURE: 126 MMHG | DIASTOLIC BLOOD PRESSURE: 80 MMHG | HEART RATE: 74 BPM | BODY MASS INDEX: 30.2 KG/M2 | OXYGEN SATURATION: 99 % | HEIGHT: 72 IN | TEMPERATURE: 97.4 F | RESPIRATION RATE: 18 BRPM | WEIGHT: 223 LBS

## 2023-03-06 DIAGNOSIS — E66.3 OVERWEIGHT (BMI 25.0-29.9): ICD-10-CM

## 2023-03-06 DIAGNOSIS — Z72.0 TOBACCO ABUSE: ICD-10-CM

## 2023-03-06 DIAGNOSIS — E78.2 MIXED HYPERLIPIDEMIA: ICD-10-CM

## 2023-03-06 DIAGNOSIS — J32.9 RECURRENT SINUSITIS: ICD-10-CM

## 2023-03-06 DIAGNOSIS — I48.0 PAROXYSMAL ATRIAL FIBRILLATION (HCC): ICD-10-CM

## 2023-03-06 DIAGNOSIS — Z79.01 LONG TERM CURRENT USE OF ANTICOAGULANT THERAPY: ICD-10-CM

## 2023-03-06 DIAGNOSIS — E27.8 ADRENAL NODULE (HCC): ICD-10-CM

## 2023-03-06 DIAGNOSIS — J43.2 CENTRILOBULAR EMPHYSEMA (HCC): ICD-10-CM

## 2023-03-06 DIAGNOSIS — Z12.5 SCREENING FOR PROSTATE CANCER: ICD-10-CM

## 2023-03-06 DIAGNOSIS — D72.829 LEUKOCYTOSIS, UNSPECIFIED TYPE: ICD-10-CM

## 2023-03-06 DIAGNOSIS — E04.2 MULTIPLE THYROID NODULES: Primary | ICD-10-CM

## 2023-03-06 DIAGNOSIS — I10 ESSENTIAL HYPERTENSION: ICD-10-CM

## 2023-03-06 DIAGNOSIS — R73.9 HYPERGLYCEMIA: ICD-10-CM

## 2023-03-06 DIAGNOSIS — J30.9 ALLERGIC RHINITIS, UNSPECIFIED SEASONALITY, UNSPECIFIED TRIGGER: ICD-10-CM

## 2023-03-06 PROBLEM — F17.210 TOBACCO DEPENDENCE DUE TO CIGARETTES: Status: RESOLVED | Noted: 2023-01-18 | Resolved: 2023-03-06

## 2023-03-06 RX ORDER — AMOXICILLIN AND CLAVULANATE POTASSIUM 875; 125 MG/1; MG/1
1 TABLET, FILM COATED ORAL EVERY 12 HOURS SCHEDULED
Qty: 20 TABLET | Refills: 0 | Status: SHIPPED | OUTPATIENT
Start: 2023-03-06 | End: 2023-03-16

## 2023-03-06 RX ORDER — AMOXICILLIN AND CLAVULANATE POTASSIUM 875; 125 MG/1; MG/1
1 TABLET, FILM COATED ORAL EVERY 12 HOURS SCHEDULED
Qty: 20 TABLET | Refills: 0 | Status: SHIPPED | OUTPATIENT
Start: 2023-03-06 | End: 2023-03-06 | Stop reason: SDUPTHER

## 2023-03-06 NOTE — ASSESSMENT & PLAN NOTE
Denies acute infection of sinus, will treat sinusitis, repeat in 4 weeks but if still elevated hematology consultation will be ordered

## 2023-03-06 NOTE — PATIENT INSTRUCTIONS
Initial Augmentin for sinusitis  Complete CT of sinuses  Follow with ENT, Dr Rabia Gaitan, for recurrent sinusitis  Repeat CBC in 4 weeks    If white count is still elevated after sinusitis treatment will refer to hematology  Follow with all specialist per their instructions  Diet exercise weight loss recommended  Return in 6 months for office visit, blood work, and [employer]

## 2023-03-13 ENCOUNTER — TELEMEDICINE (OUTPATIENT)
Dept: CARDIOLOGY CLINIC | Facility: CLINIC | Age: 68
End: 2023-03-13

## 2023-03-13 VITALS
HEIGHT: 72 IN | SYSTOLIC BLOOD PRESSURE: 134 MMHG | DIASTOLIC BLOOD PRESSURE: 74 MMHG | BODY MASS INDEX: 30.2 KG/M2 | WEIGHT: 223 LBS | HEART RATE: 70 BPM

## 2023-03-13 DIAGNOSIS — I48.0 PAROXYSMAL ATRIAL FIBRILLATION (HCC): ICD-10-CM

## 2023-03-13 NOTE — PROGRESS NOTES
Virtual Regular Visit    Verification of patient location:    Patient is located in the following state in which I hold an active license [place]      Assessment/Plan:    Problem List Items Addressed This Visit        Cardiovascular and Mediastinum    Paroxysmal atrial fibrillation ([address] )   80 yo male- Paroxysmal afib [id number], stable for years on flecainide 100mg bid and ELiquis  Has quit ETOH  -Presumed CAD, 80th % calcium score, RCA distribution Nuclear stress pending but patient reluctatn to do it  Former tobacco  Normal EF  Plan  1) d/c Flecainide as it is contraindicated in CAD  2) Recommend he does get the Nuclear stress  3) If afib recurs increase metoprolol and can opt for alternative antiarrhythmic, unless stress is negative, then we can resume flecainide or pursue ablation  4) cont eliquis       Reason for visit is   Chief Complaint   Patient presents with   • Consult     Has had no afib for 6 yrs since being on flecainide    • Virtual Regular Visit        Encounter provider Francesca Azar MD    Provider located at [address] [id number]      Recent Visits  Date Type Provider Dept   03/06/23 Office Visit Evan Varela, [facility] [facility] Primary Care   Showing recent visits within past 7 days and meeting all other requirements  Today's Visits  Date Type Provider Dept   03/13/23 Telemedicine Francesca Azar MD [address] [place] [address] Avenue today's visits and meeting all other requirements  Future Appointments  No visits were found meeting these conditions  Showing future appointments within next 150 days and meeting all other requirements       The patient was identified by name and date of birth  Mirella Ramirez was informed that this is a telemedicine visit and that the visit is being conducted through the [facility]  He agrees to proceed     My office door was closed  No one else was in the room  He acknowledged consent and understanding of privacy and security of the video platform  The patient has agreed to participate and understands they can discontinue the visit at any time  Patient is aware this is a billable service  Subjective  Amy Horner is a 79 y o  male with parosymal afib, on flecainide and stable for years, prior DCCV initially abou 2015, then had another admission for afib, and started on flecainide and no afib since  He had positive calcium score in RCA distribution and ordered stress test but was reluctant to do that  Quit ETOH so wants to see if can stop flecainide  Brit QUINTERO     Past Medical History:   Diagnosis Date   • Atrial fibrillation ([address] ) 06/26/2015   • Colon polyp    • HLD (hyperlipidemia)    • HTN (hypertension)    • Hypertension    • Irregular heart beat    • Multiple thyroid nodules    • Positive FIT (fecal immunochemical test)    • Pre-diabetes    • Vitamin D deficiency        Past Surgical History:   Procedure Laterality Date   • CARDIOVERSION  06/28/2015   • COLONOSCOPY     • US GUIDED THYROID BIOPSY  10/16/2020       Current Outpatient Medications   Medication Sig Dispense Refill   • acyclovir (ZOVIRAX) 800 mg tablet Take 1 tablet (800 mg total) by mouth 2 (two) times a day (Patient taking differently: Take 800 mg by mouth if needed) 180 tablet 1   • amLODIPine (NORVASC) 5 mg tablet TAKE 1 TABLET TWICE A  tablet 3   • amoxicillin-clavulanate (Augmentin) 875-125 mg per tablet Take 1 tablet by mouth every 12 (twelve) hours for 10 days 20 tablet 0   • apixaban (Eliquis) 5 mg Take 1 tablet (5 mg total) by mouth 2 (two) times a day 180 tablet 4   • atorvastatin (LIPITOR) 40 mg tablet TAKE 1 TABLET DAILY 90 tablet 3   • Cholecalciferol (VITAMIN D3) 1000 units CAPS Take 5,000 Units by mouth 3 (three) times a week     • Coenzyme Q10 (CO Q-10) 200 MG CAPS Patient states that he takes this on a daily basis        • Cyanocobalamin ER 1000 MCG TBCR 1 daily     • fluticasone (FLONASE) 50 mcg/act nasal spray 1 spray into each nostril daily 9 9 mL 0   • lisinopril (ZESTRIL) 20 mg tablet TAKE 1 TABLET TWICE A  tablet 3   • LORazepam (ATIVAN) 1 mg tablet Take 1 tablet (1 mg total) by mouth 2 (two) times a day As needed for anxiety 180 tablet 1   • metoprolol tartrate (LOPRESSOR) 25 mg tablet TAKE 1 TABLET TWICE A  tablet 3   • Multiple Vitamins-Minerals (MULTIVITAMIN ADULT PO) take 1 by Oral route once     • selenium 200 mcg take one tablet daily     • neomycin-polymyxin-hydrocortisone (CORTISPORIN) 0 35%-10,000 units/mL-1% otic suspension Administer 3 drops to the right ear 4 (four) times a day for 5 days 3 mL 0   • ZINC PICOLINATE PO Take 22 mg by mouth daily (Patient not taking: Reported on 3/13/2023)       No current facility-administered medications for this visit          Allergies   Allergen Reactions   • No Active Allergies        Video Exam    Vitals:    03/13/23 1532   BP: 134/74   BP Location: Left arm   Patient Position: Sitting   Cuff Size: Standard   Pulse: 70   Weight: 101 kg (223 lb)   Height: 6' (1 829 m)           I spent 25 minutes directly with the patient during this visit

## 2023-03-18 ENCOUNTER — HOSPITAL ENCOUNTER (OUTPATIENT)
Dept: CT IMAGING | Facility: HOSPITAL | Age: 68
Discharge: HOME | End: 2023-03-18

## 2023-03-18 DIAGNOSIS — J32.9 RECURRENT SINUSITIS: ICD-10-CM

## 2023-03-20 ENCOUNTER — HOSPITAL ENCOUNTER (OUTPATIENT)
Dept: NUCLEAR MEDICINE | Facility: HOSPITAL | Age: 68
Discharge: HOME | End: 2023-03-20
Attending: INTERNAL MEDICINE

## 2023-03-20 ENCOUNTER — HOSPITAL ENCOUNTER (OUTPATIENT)
Dept: NON INVASIVE DIAGNOSTICS | Facility: HOSPITAL | Age: 68
Discharge: HOME | End: 2023-03-20
Attending: INTERNAL MEDICINE

## 2023-03-20 VITALS — WEIGHT: 223 LBS | BODY MASS INDEX: 30.2 KG/M2 | HEIGHT: 72 IN

## 2023-03-20 DIAGNOSIS — I48.0 PAROXYSMAL ATRIAL FIBRILLATION (HCC): ICD-10-CM

## 2023-03-20 DIAGNOSIS — Z79.899 LONG TERM CURRENT USE OF ANTIARRHYTHMIC MEDICAL THERAPY: ICD-10-CM

## 2023-03-20 LAB
CHEST PAIN STATEMENT: NORMAL
MAX DIASTOLIC BP: 77 MMHG
MAX HEART RATE: 84 BPM
MAX PREDICTED HEART RATE: 153 BPM
MAX. SYSTOLIC BP: 132 MMHG
NUC STRESS EJECTION FRACTION: 57 %
PROTOCOL NAME: NORMAL
RATE PRESSURE PRODUCT: NORMAL
REASON FOR TERMINATION: NORMAL
SL CV REST NUCLEAR ISOTOPE DOSE: 10.2 MCI
SL CV STRESS NUCLEAR ISOTOPE DOSE: 32.1 MCI
SL CV STRESS RECOVERY BP: NORMAL MMHG
SL CV STRESS RECOVERY HR: 77 BPM
SL CV STRESS RECOVERY O2 SAT: 98 %
STRESS ANGINA INDEX: 0
STRESS BASELINE BP: NORMAL MMHG
STRESS BASELINE HR: 65 BPM
STRESS O2 SAT REST: 99 %
STRESS PEAK HR: 84 BPM
STRESS POST O2 SAT PEAK: 99 %
STRESS POST PEAK BP: 132 MMHG
STRESS ST DEPRESSION: 0 MM
STRESS/REST PERFUSION RATIO: 1.06
TARGET HR FORMULA: NORMAL
TEST INDICATION: NORMAL
TIME IN EXERCISE PHASE: NORMAL

## 2023-03-20 RX ADMIN — REGADENOSON 0.4 MG: 0.08 INJECTION, SOLUTION INTRAVENOUS at 09:46

## 2023-03-22 ENCOUNTER — TELEPHONE (OUTPATIENT)
Dept: CARDIOLOGY CLINIC | Facility: CLINIC | Age: 68
End: 2023-03-22

## 2023-03-22 NOTE — TELEPHONE ENCOUNTER
PC from patient looking for his nuclear stress test results  As of today, it is incomplete and not signed yet

## 2023-03-24 ENCOUNTER — TELEPHONE (OUTPATIENT)
Dept: CARDIOLOGY CLINIC | Facility: CLINIC | Age: 68
End: 2023-03-24

## 2023-03-24 NOTE — TELEPHONE ENCOUNTER
Returned phone call to the patient I discussed the results of his nuclear stress test which did not identify any blockage in the artery of the heart suggest presence of significant coronary artery disease  Left ventricular function on the post-rest imaging was normal with EF of 57%  Reviewed recommendations by patient's electrophysiologist Dr Kenneth Ruiz recently  Agrees with the plan to resume flecainide at 50 mg twice daily  He is advised to report to us if he develops any concerning symptoms or if he has any atrial fibrillation episodes  He will follow-up with us in office as scheduled  If he has recurrence of atrial fibrillation then he will follow-up with Dr Opal Owens and be considered for ablation procedure for atrial fibrillation      Claire Espana MD

## 2023-03-31 ENCOUNTER — APPOINTMENT (OUTPATIENT)
Dept: LAB | Facility: CLINIC | Age: 68
End: 2023-03-31

## 2023-03-31 DIAGNOSIS — D72.829 LEUKOCYTOSIS, UNSPECIFIED TYPE: ICD-10-CM

## 2023-03-31 LAB
BASOPHILS # BLD AUTO: 0.09 THOUSANDS/ÂΜL (ref 0–0.1)
BASOPHILS NFR BLD AUTO: 1 % (ref 0–1)
EOSINOPHIL # BLD AUTO: 0.19 THOUSAND/ÂΜL (ref 0–0.61)
EOSINOPHIL NFR BLD AUTO: 2 % (ref 0–6)
ERYTHROCYTE [DISTWIDTH] IN BLOOD BY AUTOMATED COUNT: 13.2 % (ref 11.6–15.1)
HCT VFR BLD AUTO: 47.1 % (ref 36.5–49.3)
HGB BLD-MCNC: 15.1 G/DL (ref 12–17)
IMM GRANULOCYTES # BLD AUTO: 0.06 THOUSAND/UL (ref 0–0.2)
IMM GRANULOCYTES NFR BLD AUTO: 1 % (ref 0–2)
LYMPHOCYTES # BLD AUTO: 2.26 THOUSANDS/ÂΜL (ref 0.6–4.47)
LYMPHOCYTES NFR BLD AUTO: 18 % (ref 14–44)
MCH RBC QN AUTO: 30.3 PG (ref 26.8–34.3)
MCHC RBC AUTO-ENTMCNC: 32.1 G/DL (ref 31.4–37.4)
MCV RBC AUTO: 94 FL (ref 82–98)
MONOCYTES # BLD AUTO: 0.78 THOUSAND/ÂΜL (ref 0.17–1.22)
MONOCYTES NFR BLD AUTO: 6 % (ref 4–12)
NEUTROPHILS # BLD AUTO: 9.09 THOUSANDS/ÂΜL (ref 1.85–7.62)
NEUTS SEG NFR BLD AUTO: 72 % (ref 43–75)
NRBC BLD AUTO-RTO: 0 /100 WBCS
PLATELET # BLD AUTO: 358 THOUSANDS/UL (ref 149–390)
PMV BLD AUTO: 9.8 FL (ref 8.9–12.7)
RBC # BLD AUTO: 4.99 MILLION/UL (ref 3.88–5.62)
WBC # BLD AUTO: 12.47 THOUSAND/UL (ref 4.31–10.16)

## 2023-04-03 ENCOUNTER — TELEPHONE (OUTPATIENT)
Dept: FAMILY MEDICINE CLINIC | Facility: CLINIC | Age: 68
End: 2023-04-03

## 2023-04-03 DIAGNOSIS — D72.829 LEUKOCYTOSIS, UNSPECIFIED TYPE: Primary | ICD-10-CM

## 2023-04-03 DIAGNOSIS — J32.9 RECURRENT SINUSITIS: Primary | ICD-10-CM

## 2023-04-03 RX ORDER — CEFUROXIME AXETIL 500 MG/1
500 TABLET ORAL EVERY 12 HOURS SCHEDULED
Qty: 20 TABLET | Refills: 0 | Status: SHIPPED | OUTPATIENT
Start: 2023-04-03 | End: 2023-04-13

## 2023-04-03 NOTE — TELEPHONE ENCOUNTER
----- Message from Arnoldo Velazquez MD sent at 4/2/2023  8:47 PM EDT -----  White cells a tad  high, any sx  infection?

## 2023-04-03 NOTE — TELEPHONE ENCOUNTER
Patient had seen results via mychart  Patient advises he is still experiencing sinus infection, patient completed 10 days of antibiotic and was wondering if he can get a stronger antibiotic to be sent to [facility] on [address]  He is still having sinus pressure when he blows his nose green mucus comes out

## 2023-04-04 DIAGNOSIS — J30.2 SEASONAL ALLERGIC RHINITIS, UNSPECIFIED TRIGGER: ICD-10-CM

## 2023-04-04 RX ORDER — FLUTICASONE PROPIONATE 50 MCG
2 SPRAY, SUSPENSION (ML) NASAL DAILY
Qty: 9.9 ML | Refills: 5 | Status: SHIPPED | OUTPATIENT
Start: 2023-04-04

## 2023-04-04 NOTE — TELEPHONE ENCOUNTER
Requested Prescriptions     Pending Prescriptions Disp Refills   • fluticasone (FLONASE) 50 mcg/act nasal spray 9 9 mL 0     Si spray into each nostril daily     Patient is requesting a 90 days supply of Flonase [facility]    Last OV 3/6/23 with dr Luna

## 2023-04-04 NOTE — TELEPHONE ENCOUNTER
Patient notified of rx send to pharmacy, he is questioning if he needs to repeat blood work again after taking medication

## 2023-04-05 ENCOUNTER — TELEPHONE (OUTPATIENT)
Dept: CARDIOLOGY CLINIC | Facility: CLINIC | Age: 68
End: 2023-04-05

## 2023-04-05 NOTE — TELEPHONE ENCOUNTER
Pt calling feels like he is atrial fib this AM since he woke up  Maureen Almaraz Flecainide was reduced to 50 mg BID (previously 100 BID) noticed HR like 100 bpm today he states he did feel fatigue the past few days but did not notice an increased rate til this AM   Will discuss with Dr Fracisco Welsh for advisement

## 2023-04-05 NOTE — TELEPHONE ENCOUNTER
Returned phone call to the patient who mentions that he had been having URI symptoms recently and developed atrial fibrillation  Reports feeling fatigued  He was advised this morning to increase flecainide dose to 100 mg twice daily  Patient currently reports that he is feeling slightly better but still is not A-fib  His blood pressure is 139 over 70s and heart rate was 119 bpm   I am advising him to continue flecainide 100 mg twice daily and increase metoprolol succinate to 50 mg in the morning and 25 mg in the evening  Patient is going to call us on Thursday to report if he is still in A-fib with RVR  If he remains in this rhythm then we can consider cardioversion procedure on Friday      Calin Canales MD

## 2023-04-05 NOTE — TELEPHONE ENCOUNTER
Reviewed with Dr Karmen Garnica   the patient to increase flecainide back to 100 BID  If pat is symptomatic to come to ED for possible CV  Pt states not symptomatic   will take increased medication dose and Dr Karmen Garnica states will call patient later  Pt understands

## 2023-04-06 ENCOUNTER — TELEPHONE (OUTPATIENT)
Dept: CARDIOLOGY CLINIC | Facility: CLINIC | Age: 68
End: 2023-04-06

## 2023-04-06 DIAGNOSIS — I48.91 ATRIAL FIBRILLATION WITH RVR (HCC): ICD-10-CM

## 2023-04-06 DIAGNOSIS — I48.0 PAROXYSMAL ATRIAL FIBRILLATION (HCC): Primary | ICD-10-CM

## 2023-04-06 RX ORDER — SODIUM CHLORIDE 9 MG/ML
50 INJECTION, SOLUTION INTRAVENOUS CONTINUOUS
OUTPATIENT
Start: 2023-04-06

## 2023-04-06 NOTE — TELEPHONE ENCOUNTER
Patient scheduled for cardioversion tomorrow 4/7/23 at [facility] through [facility] in the cath lab  Patient has Medicare, so lizabeth Garcia in office   Patient contacted and instructions given such as NPO, directions,etc    mana

## 2023-04-07 ENCOUNTER — TELEPHONE (OUTPATIENT)
Dept: CARDIOLOGY CLINIC | Facility: CLINIC | Age: 68
End: 2023-04-07

## 2023-04-07 NOTE — TELEPHONE ENCOUNTER
Pt scheduled for elective CV today and feels he may have converted back to NSR  Pt will come to office this AM for EKG if NSR CV will be cancelled    After EKG please review with Dr Hussain Johnson who is in the office this AM

## 2023-04-12 DIAGNOSIS — D72.829 LEUKOCYTOSIS, UNSPECIFIED TYPE: Primary | ICD-10-CM

## 2023-04-12 RX ORDER — FLECAINIDE ACETATE 100 MG/1
100 TABLET ORAL EVERY 12 HOURS
COMMUNITY
Start: 2023-04-05

## 2023-05-05 ENCOUNTER — CONSULT (OUTPATIENT)
Dept: HEMATOLOGY ONCOLOGY | Facility: CLINIC | Age: 68
End: 2023-05-05

## 2023-05-05 VITALS
HEIGHT: 72 IN | BODY MASS INDEX: 30.34 KG/M2 | RESPIRATION RATE: 14 BRPM | SYSTOLIC BLOOD PRESSURE: 142 MMHG | WEIGHT: 224 LBS | OXYGEN SATURATION: 97 % | HEART RATE: 64 BPM | TEMPERATURE: 97.8 F | DIASTOLIC BLOOD PRESSURE: 80 MMHG

## 2023-05-05 DIAGNOSIS — D72.9 NEUTROPHILIC LEUKOCYTOSIS: Primary | ICD-10-CM

## 2023-05-05 DIAGNOSIS — D72.829 LEUKOCYTOSIS, UNSPECIFIED TYPE: ICD-10-CM

## 2023-05-05 PROBLEM — Z12.5 SCREENING FOR PROSTATE CANCER: Status: RESOLVED | Noted: 2022-08-31 | Resolved: 2023-05-05

## 2023-05-05 PROBLEM — J32.9 RECURRENT SINUSITIS: Status: RESOLVED | Noted: 2022-08-31 | Resolved: 2023-05-05

## 2023-05-05 PROBLEM — Z00.00 HEALTHCARE MAINTENANCE: Status: RESOLVED | Noted: 2022-08-31 | Resolved: 2023-05-05

## 2023-05-05 PROBLEM — Z12.11 SCREENING FOR COLON CANCER: Status: RESOLVED | Noted: 2022-08-31 | Resolved: 2023-05-05

## 2023-05-05 NOTE — PROGRESS NOTES
Oncology Outpatient Consult Note  Sruthi Hoyos 79 y o  male MRN: @ Encounter: [id number]        Date:  5/5/2023    Assessment/ Plan:      1  Leukocytosis with mildly elevated ANC  2  Chronic sinusitis  3  Active tobacco use    Mr Jewels Ortega is a 79year old male with active tobacco use, underlying A-fib (on Eliquis), hypertension, and hyperlipidemia who is presenting to hematology clinic for evaluation and recommendations for mild/persistent leukocytosis noted on CBCs from August 2022  His leukocytosis is likely reactive in the setting of active smoking (causing some chronic inflammation), steroid exposure (currently on intranasal steroids), and from possible ongoing inflammation from chronic sinusitis  Patient does not have a significantly elevated total WBC count and differentials does not show any presence of immature WBC that would raise concern for any underlying leukemias  Based on his current CBC trend/parameters and absence of any concerning clinical symptoms, there is very low clinical suspicion for underlying leukemias or any myeloproliferative neoplasms contributing to his leukocytosis  Patient was recommended to have a CBC with differential checked in about 4 months, in addition to inflammatory markers like ESR, CRP, and LD  Additionally, patient was recommended to have an SPEP and immunoglobulin levels  Patient was in agreement with the recommendations as above  He knows to call the office and inform us if he has any concerning symptoms that warrant more immediate attention  Labs and imaging studies are reviewed by ordering provider once results are available  If there are findings that need immediate attention, you will be contacted when results available  Discussing results and the implication on your healthcare is best discussed in person at your follow-up visit         CC:  Leukocytosis      HPI:  Sruthi Hoyos is seen for initial consultation 5/5/2023 for evaluation of persistent leukocytosis  Mr  Santos Sánchez has underlying afib (on Eliquis for [facility]), hypertension, hyperlipidemia, active tobacco abuse (started smoking at age 25, used to smoke 1 pack daily, now 1/2 pack daily), and pre-diabetes  Patient was referred to hematology team by PCP for further evaluation of persistent leukocytosis since August 2022  Patient relayed that he has been having recurrent sinus infections/congestion since Fall 2022, requiring several courses of antibiotics and steroid treatments  He is currently on inhaled steroids (fluticasone)  Additionally, he continues to smoke 8-10 cigarettes daily  Patient denied any significant weight loss, night sweats, chills  Denied any known personal or family history of malignancies  He is getting LDCT's as part of lung cancer screening protocol  Most recent one was in November 2022, which did not reveal concerning pulmonary nodules  Patient's CBC till February 2022 showed WBC count within high normal limits, however labs since August 2022 shows mildly elevated total WBC count with WBCs ranging from 10k-12k  Differential also shows mildly elevated ANC, ranging from 7700 to 9400  All other CBC parameters have been normal limits  Social history: Started smoking at age 25  Used to smoke a pack a day  He now smokes about half pack a day  Denies any current heavy alcohol use or illicit drug use history  He used to work as a pharmacist at [employer]  Family history: Denies any known family history of malignancies  Test Results:    Imaging:  US thyroid    Result Date: 4/23/2023  Narrative: THYROID ULTRASOUND INDICATION:    E04 2: Nontoxic multinodular goiter  COMPARISON:  Compared with 10/14/2022 TECHNIQUE:   Ultrasound of the thyroid was performed with a high frequency linear transducer in transverse and sagittal planes including volumetric imaging sweeps as well as traditional still imaging technique   FINDINGS:  Thyroid parenchyma is diffusely heterogeneous in echotexture with focal nodule(s) as described below  Right lobe: 7 9 x 3 4 x 3 7 cm  Volume 46 9 mL Left lobe:  5 7 x 2 6 x 2 7 cm  Volume 19 0 mL Isthmus: 0 2  cm  Nodule #1  Image 16  RIGHT lower pole nodule measuring 3 9 x 3 6 x 3 6 cm compared to 3 1 x 2 9 x 2 7 cm  And on 10/14/2022 3 7 x 3 1 x 3 2 cm COMPOSITION:  0 points, spongiform  ECHOGENICITY:  1 point, hyperechoic or isoechoic  SHAPE:  0 points, wider-than-tall  MARGIN: 0 points, ill-defined  ECHOGENIC FOCI:  0 points, none or large comet-tail artifacts  TI-RADS Classification: TR 1 (0 points), Benign  No FNA  Nodule #2  Image 4  RIGHT upper pole nodule measuring 0 9 x 0 7 x 0 7 cm compared to 1 x 1 x 0 6 cm    COMPOSITION:  0 points, spongiform  ECHOGENICITY:  1 point, hyperechoic or isoechoic  SHAPE:  0 points, wider-than-tall  MARGIN: 0 points, smooth  ECHOGENIC FOCI:  0 points, none or large comet-tail artifacts  TI-RADS Classification: TR 1 (0 points), Benign  No FNA  Nodule #3  Image 9 RIGHT midgland nodule measuring 0 9 x 0 6 x 0 9 cm compared to 0 9 x 0 9 x 1 2 cm  COMPOSITION:  1 point, mixed cystic and solid  ECHOGENICITY:  1 point, hyperechoic or isoechoic  SHAPE:  0 points, wider-than-tall  MARGIN: 0 points, smooth  ECHOGENIC FOCI:  0 points, none or large comet-tail artifacts  TI-RADS Classification: TR 2 (2 points), Not suspious  No FNA  Nodule #4  Image 41  LEFT upper pole nodule measuring 1 2 x 0 7 x 0 8 cm compared to 1 2 x 0 7 x 1 cm  COMPOSITION:  0 points, spongiform  ECHOGENICITY:  1 point, hyperechoic or isoechoic  SHAPE:  0 points, wider-than-tall  MARGIN: 0 points, ill-defined  ECHOGENIC FOCI:  0 points, none or large comet-tail artifacts  TI-RADS Classification: TR 1 (0 points), Benign  No FNA  Nodule #5  Image 56  LEFT lower pole nodule measuring 1 8 x 1 4 x 1 2 cm compared to 1 8 x 1 3 x 1 2 cm  COMPOSITION:  1 point, mixed cystic and solid  ECHOGENICITY:  2 points, hypoechoic    SHAPE:  3 points, taller-than-wide  MARGIN: 0 points, ill-defined  ECHOGENIC FOCI:  1 point, macrocalcifications  TI-RADS Classification: TR 5 (7 or > points), Highly suspicious  FNA if > 1 cm  Follow if > 0 5 cm  Impression: The following meet current ACR criteria for recommending ultrasound guided biopsy similar to prior study: The 1 8 x 1 4 x 1 0 cm left lower pole nodule  (Image number 56) (CRITERIA: TR 5, Highly suspicious  FNA if > 1 cm  The study was marked in EPIC for significant notification  Reference: ACR Thyroid Imaging, Reporting and Data System (TI-RADS): White Paper of the [employer]  J AM Alana Radiol 0178;05:131-365  (additional recommendations based on American Thyroid Association 2015 guidelines ) Workstation performed: [id number]       Labs:   Lab Results   Component Value Date    WBC 12 09 (H) 04/12/2023    HGB 14 6 04/12/2023    HCT 45 4 04/12/2023    MCV 95 04/12/2023     04/12/2023     Lab Results   Component Value Date    K 4 2 03/02/2023     03/02/2023    CO2 27 03/02/2023    BUN 21 03/02/2023    CREATININE 0 85 03/02/2023    GLUF 92 03/02/2023    CALCIUM 9 3 03/02/2023    AST 11 03/02/2023    ALT 21 03/02/2023    ALKPHOS 69 03/02/2023    EGFR 90 03/02/2023         Lab Results   Component Value Date    SPEP See Comment 09/03/2021    UPEP See Comment 09/03/2021       Lab Results   Component Value Date    PSA 3 1 08/29/2022    PSA 3 1 02/28/2022    PSA 2 9 08/18/2021       No results found for: CEA    No results found for:     No results found for: AFP    No results found for: IRON, TIBC, FERRITIN    No results found for: [id number]        ROS: Review of Systems   Constitutional: Negative for appetite change, chills, fatigue, fever and unexpected weight change  HENT:   Negative for mouth sores, sore throat and tinnitus  Chronic sinus congestion/sinusitis, worsened since Fall 2022   Eyes: Negative for eye problems     Respiratory: Negative for chest tightness, cough, hemoptysis and shortness of breath  Cardiovascular: Negative for chest pain, leg swelling and palpitations  Gastrointestinal: Negative for abdominal pain, blood in stool, constipation, diarrhea, nausea and vomiting  Genitourinary: Negative for difficulty urinating, dysuria, frequency and hematuria  Musculoskeletal: Negative for arthralgias, back pain, flank pain and gait problem  Skin: Negative for itching and rash  Neurological: Negative for dizziness, extremity weakness, gait problem, headaches, light-headedness, numbness and speech difficulty  Hematological: Negative for adenopathy  Does not bruise/bleed easily  Psychiatric/Behavioral: Negative for confusion  The patient is not nervous/anxious  Active Problems:   Patient Active Problem List   Diagnosis    Paroxysmal atrial fibrillation (HCC)    Mixed hyperlipidemia    Adrenal nodule (HCC)    Anxiety state    Multiple thyroid nodules    Long term current use of anticoagulant therapy    Essential hypertension    Tobacco abuse    Screening for colon cancer    Screening for prostate cancer    Healthcare maintenance    Elevated brain natriuretic peptide (BNP) level    Overweight (BMI 25 0-29  9)    Medication management contract agreement    Recurrent sinusitis    Hyperglycemia    Centrilobular emphysema (HCC)    Allergic rhinitis    Leukocytosis       Past Medical History:   Past Medical History:   Diagnosis Date    Atrial fibrillation ([address] ) 06/26/2015    Colon polyp     HLD (hyperlipidemia)     HTN (hypertension)     Hypertension     Irregular heart beat     Multiple thyroid nodules     Positive FIT (fecal immunochemical test)     Pre-diabetes     Vitamin D deficiency        Surgical History:   Past Surgical History:   Procedure Laterality Date    CARDIOVERSION  06/28/2015    COLONOSCOPY      US GUIDED THYROID BIOPSY  10/16/2020       Family History:    Family History   Problem Relation Age of Onset    Heart attack Father 61        MI    Heart attack Paternal Uncle         MI       Cancer-related family history is not on file  Social History:   Social History     Socioeconomic History    Marital status: /Civil Union     Spouse name: Not on file    Number of children: Not on file    Years of education: Not on file    Highest education level: Not on file   Occupational History    Occupation: retired   Tobacco Use    Smoking status: Every Day     Packs/day: 0 50     Years: 50 00     Pack years: 25 00     Types: Cigarettes     Last attempt to quit: 2014     Years since quittin 3    Smokeless tobacco: Never   Substance and Sexual Activity    Alcohol use: Not Currently    Drug use: Never    Sexual activity: Yes     Partners: Female     Birth control/protection: None   Other Topics Concern    Not on file   Social History Narrative    Not on file     Social Determinants of Health     Financial Resource Strain: Low Risk     Difficulty of Paying Living Expenses: Not hard at all   Food Insecurity: Not on file   Transportation Needs: No Transportation Needs    Lack of Transportation (Medical): No    Lack of Transportation (Non-Medical):  No   Physical Activity: Not on file   Stress: Not on file   Social Connections: Not on file   Intimate Partner Violence: Not on file   Housing Stability: Not on file       Current Medications:   Current Outpatient Medications   Medication Sig Dispense Refill    acyclovir (ZOVIRAX) 800 mg tablet Take 1 tablet (800 mg total) by mouth 2 (two) times a day (Patient taking differently: Take 800 mg by mouth if needed) 180 tablet 1    amLODIPine (NORVASC) 5 mg tablet TAKE 1 TABLET TWICE A  tablet 3    apixaban (Eliquis) 5 mg Take 1 tablet (5 mg total) by mouth 2 (two) times a day 180 tablet 4    atorvastatin (LIPITOR) 40 mg tablet TAKE 1 TABLET DAILY 90 tablet 3    Cholecalciferol (VITAMIN D3) 1000 units CAPS Take 5,000 Units by mouth 3 (three) times a week      Coenzyme Q10 (CO Q-10) 200 MG CAPS Patient states that he takes this on a daily basis   Cyanocobalamin ER 1000 MCG TBCR 1 daily      flecainide (TAMBOCOR) 100 mg tablet Take 100 mg by mouth every 12 (twelve) hours      fluticasone (FLONASE) 50 mcg/act nasal spray 2 sprays into each nostril daily 9 9 mL 5    lisinopril (ZESTRIL) 20 mg tablet TAKE 1 TABLET TWICE A  tablet 3    LORazepam (ATIVAN) 1 mg tablet Take 1 tablet (1 mg total) by mouth 2 (two) times a day As needed for anxiety 180 tablet 1    metoprolol tartrate (LOPRESSOR) 25 mg tablet TAKE 1 TABLET TWICE A  tablet 3    Multiple Vitamins-Minerals (MULTIVITAMIN ADULT PO) take 1 by Oral route once      neomycin-polymyxin-hydrocortisone (CORTISPORIN) 0 35%-10,000 units/mL-1% otic suspension Administer 3 drops to the right ear 4 (four) times a day for 5 days 3 mL 0    selenium 200 mcg take one tablet daily      ZINC PICOLINATE PO Take 22 mg by mouth daily (Patient not taking: Reported on 3/13/2023)       No current facility-administered medications for this visit  Allergies: Allergies   Allergen Reactions    No Active Allergies          Physical Exam:    There is no height or weight on file to calculate BSA  Wt Readings from Last 3 Encounters:   04/07/23 101 kg (223 lb)   03/20/23 101 kg (223 lb)   03/13/23 101 kg (223 lb)        Temp Readings from Last 3 Encounters:   03/06/23 (!) 97 4 °F (36 3 °C) (Tympanic)   01/05/23 97 8 °F (36 6 °C) (Temporal)   08/31/22 (!) 97 4 °F (36 3 °C) (Temporal)        BP Readings from Last 3 Encounters:   04/07/23 121/80   03/13/23 134/74   03/06/23 126/80         Pulse Readings from Last 3 Encounters:   04/07/23 78   03/13/23 70   03/06/23 74     @LASTSAO2(3)@    Physical Exam  Vitals reviewed  Constitutional:       General: He is not in acute distress  Appearance: He is not ill-appearing, toxic-appearing or diaphoretic     HENT:      Head: Normocephalic and atraumatic  Mouth/Throat:      Mouth: Mucous membranes are moist       Pharynx: No oropharyngeal exudate  Eyes:      General: No scleral icterus  Extraocular Movements: Extraocular movements intact  Conjunctiva/sclera: Conjunctivae normal    Cardiovascular:      Rate and Rhythm: Normal rate and regular rhythm  Heart sounds: No murmur heard  No gallop  Pulmonary:      Effort: No respiratory distress  Breath sounds: Normal breath sounds  No wheezing, rhonchi or rales  Abdominal:      General: Bowel sounds are normal  There is no distension  Palpations: Abdomen is soft  There is no mass  Tenderness: There is no abdominal tenderness  There is no guarding  Musculoskeletal:         General: No swelling, tenderness or deformity  Cervical back: Normal range of motion  No rigidity  Right lower leg: No edema  Left lower leg: No edema  Lymphadenopathy:      Cervical: No cervical adenopathy  Skin:     General: Skin is warm and dry  Findings: No erythema, lesion or rash  Neurological:      General: No focal deficit present  Mental Status: He is alert and oriented to person, place, and time     Psychiatric:         Mood and Affect: Mood normal          Judgment: Judgment normal

## 2023-05-05 NOTE — PATIENT INSTRUCTIONS
Please follow-up with ENT physician for further evaluation of your recurrent sinusitis  Please get bloodwork 7-10 days prior to your scheduled office visit  Smoking cessation is strongly advised

## 2023-05-16 ENCOUNTER — TELEPHONE (OUTPATIENT)
Dept: FAMILY MEDICINE CLINIC | Facility: CLINIC | Age: 68
End: 2023-05-16

## 2023-05-16 DIAGNOSIS — Z86.19 H/O COLD SORES: ICD-10-CM

## 2023-05-16 RX ORDER — ACYCLOVIR 800 MG/1
800 TABLET ORAL 2 TIMES DAILY
Qty: 120 TABLET | Refills: 0 | Status: SHIPPED | OUTPATIENT
Start: 2023-05-16 | End: 2023-08-14

## 2023-05-16 RX ORDER — ACYCLOVIR 800 MG/1
800 TABLET ORAL 2 TIMES DAILY
Qty: 14 TABLET | Refills: 3 | Status: SHIPPED | OUTPATIENT
Start: 2023-05-16 | End: 2023-05-16 | Stop reason: SDUPTHER

## 2023-05-16 RX ORDER — ACYCLOVIR 800 MG/1
800 TABLET ORAL 2 TIMES DAILY
Qty: 120 TABLET | Refills: 0 | Status: SHIPPED | OUTPATIENT
Start: 2023-05-16 | End: 2023-05-16

## 2023-05-16 NOTE — TELEPHONE ENCOUNTER
I ordered Zovirax 800 mg twice daily for 1 week, number 14 tablets with 3 refills    Will not order 90 days of this cold sore

## 2023-05-16 NOTE — TELEPHONE ENCOUNTER
Medication: Acyclovir 800 mg   Day supply: 60 (Pt states he can get $120 pills for $10)   Pharmacy: [facility]     Last office visit: 3/6/23   Upcoming office visit: Visit date not found    Patient states that he has a cold sore & the medication he has at home is    I did let him know that TS might require an office visit but pt wanted me to try & send a message, first

## 2023-05-16 NOTE — TELEPHONE ENCOUNTER
Medication was sent to [facility], it needs to be sent to [facility] on [facility], please  Also, patient is asking if TS can please send the 120 pills instead of what was called in, so he can get it for $10  Thank you!

## 2023-05-16 NOTE — TELEPHONE ENCOUNTER
TS, Pt aware that you cannot call in a 90 day supply of this med but he wants this to go to [facility] in [place] not [place]o

## 2023-06-13 ENCOUNTER — TELEPHONE (OUTPATIENT)
Dept: CARDIOLOGY CLINIC | Facility: CLINIC | Age: 68
End: 2023-06-13

## 2023-06-13 DIAGNOSIS — I48.0 PAROXYSMAL ATRIAL FIBRILLATION (HCC): Primary | ICD-10-CM

## 2023-06-13 RX ORDER — FLECAINIDE ACETATE 50 MG/1
25 TABLET ORAL 2 TIMES DAILY
Qty: 180 TABLET | Refills: 1 | Status: SHIPPED | OUTPATIENT
Start: 2023-06-13

## 2023-06-13 NOTE — TELEPHONE ENCOUNTER
Patient is back in AF and wondering if he can increase his Flecainide  He is on 100 mgs bid        [phone]

## 2023-06-19 ENCOUNTER — CLINICAL SUPPORT (OUTPATIENT)
Dept: CARDIOLOGY CLINIC | Facility: CLINIC | Age: 68
End: 2023-06-19
Payer: MEDICARE

## 2023-06-19 VITALS
DIASTOLIC BLOOD PRESSURE: 70 MMHG | HEART RATE: 66 BPM | WEIGHT: 216.2 LBS | HEIGHT: 72 IN | SYSTOLIC BLOOD PRESSURE: 118 MMHG | BODY MASS INDEX: 29.28 KG/M2

## 2023-06-19 DIAGNOSIS — I48.0 PAROXYSMAL ATRIAL FIBRILLATION (HCC): Primary | ICD-10-CM

## 2023-06-19 PROCEDURE — 93000 ELECTROCARDIOGRAM COMPLETE: CPT | Performed by: INTERNAL MEDICINE

## 2023-06-19 NOTE — PROGRESS NOTES
Patient presents today per Dr Mariam Layton for an EKG due to increasing the Flecainide to 125mg BID  Patient states his symptoms of palpitations has resolved  Patient denies chest pain or shortness of breath  EKG reviewed by Dr Mariam Layton  Patient is back in sinus rhythm  Patient asked if having a PFT test done would affect his Afib   Per Dr Mariam Layton, it is a possibility but is ok to go through with PFT test

## 2023-08-30 ENCOUNTER — APPOINTMENT (OUTPATIENT)
Dept: LAB | Facility: CLINIC | Age: 68
End: 2023-08-30
Payer: MEDICARE

## 2023-08-30 DIAGNOSIS — D72.9 NEUTROPHILIC LEUKOCYTOSIS: ICD-10-CM

## 2023-08-30 LAB
ALBUMIN SERPL BCP-MCNC: 4.2 G/DL (ref 3.5–5)
ALP SERPL-CCNC: 60 U/L (ref 34–104)
ALT SERPL W P-5'-P-CCNC: 13 U/L (ref 7–52)
ANION GAP SERPL CALCULATED.3IONS-SCNC: 7 MMOL/L
AST SERPL W P-5'-P-CCNC: 11 U/L (ref 13–39)
BASOPHILS # BLD AUTO: 0.07 THOUSANDS/ÂΜL (ref 0–0.1)
BASOPHILS NFR BLD AUTO: 1 % (ref 0–1)
BILIRUB SERPL-MCNC: 0.65 MG/DL (ref 0.2–1)
BUN SERPL-MCNC: 19 MG/DL (ref 5–25)
CALCIUM SERPL-MCNC: 9.5 MG/DL (ref 8.4–10.2)
CHLORIDE SERPL-SCNC: 104 MMOL/L (ref 96–108)
CO2 SERPL-SCNC: 27 MMOL/L (ref 21–32)
CREAT SERPL-MCNC: 0.77 MG/DL (ref 0.6–1.3)
CRP SERPL QL: 1.2 MG/L
EOSINOPHIL # BLD AUTO: 0.21 THOUSAND/ÂΜL (ref 0–0.61)
EOSINOPHIL NFR BLD AUTO: 2 % (ref 0–6)
ERYTHROCYTE [DISTWIDTH] IN BLOOD BY AUTOMATED COUNT: 13.2 % (ref 11.6–15.1)
ERYTHROCYTE [SEDIMENTATION RATE] IN BLOOD: 6 MM/HOUR (ref 0–19)
GFR SERPL CREATININE-BSD FRML MDRD: 93 ML/MIN/1.73SQ M
GLUCOSE P FAST SERPL-MCNC: 92 MG/DL (ref 65–99)
HCT VFR BLD AUTO: 46.4 % (ref 36.5–49.3)
HGB BLD-MCNC: 15.2 G/DL (ref 12–17)
IGA SERPL-MCNC: 328 MG/DL (ref 66–433)
IGG SERPL-MCNC: 980 MG/DL (ref 635–1741)
IGM SERPL-MCNC: 78 MG/DL (ref 45–281)
IMM GRANULOCYTES # BLD AUTO: 0.02 THOUSAND/UL (ref 0–0.2)
IMM GRANULOCYTES NFR BLD AUTO: 0 % (ref 0–2)
LDH SERPL-CCNC: 139 U/L (ref 140–271)
LYMPHOCYTES # BLD AUTO: 1.89 THOUSANDS/ÂΜL (ref 0.6–4.47)
LYMPHOCYTES NFR BLD AUTO: 20 % (ref 14–44)
MCH RBC QN AUTO: 30.6 PG (ref 26.8–34.3)
MCHC RBC AUTO-ENTMCNC: 32.8 G/DL (ref 31.4–37.4)
MCV RBC AUTO: 93 FL (ref 82–98)
MONOCYTES # BLD AUTO: 0.65 THOUSAND/ÂΜL (ref 0.17–1.22)
MONOCYTES NFR BLD AUTO: 7 % (ref 4–12)
NEUTROPHILS # BLD AUTO: 6.64 THOUSANDS/ÂΜL (ref 1.85–7.62)
NEUTS SEG NFR BLD AUTO: 70 % (ref 43–75)
NRBC BLD AUTO-RTO: 0 /100 WBCS
PLATELET # BLD AUTO: 320 THOUSANDS/UL (ref 149–390)
PMV BLD AUTO: 9.6 FL (ref 8.9–12.7)
POTASSIUM SERPL-SCNC: 4.3 MMOL/L (ref 3.5–5.3)
PROT SERPL-MCNC: 7 G/DL (ref 6.4–8.4)
RBC # BLD AUTO: 4.97 MILLION/UL (ref 3.88–5.62)
SODIUM SERPL-SCNC: 138 MMOL/L (ref 135–147)
WBC # BLD AUTO: 9.48 THOUSAND/UL (ref 4.31–10.16)

## 2023-08-30 PROCEDURE — 36415 COLL VENOUS BLD VENIPUNCTURE: CPT

## 2023-08-30 PROCEDURE — 83615 LACTATE (LD) (LDH) ENZYME: CPT

## 2023-08-30 PROCEDURE — 80053 COMPREHEN METABOLIC PANEL: CPT

## 2023-08-30 PROCEDURE — 84165 PROTEIN E-PHORESIS SERUM: CPT

## 2023-08-30 PROCEDURE — 86140 C-REACTIVE PROTEIN: CPT

## 2023-08-30 PROCEDURE — 82784 ASSAY IGA/IGD/IGG/IGM EACH: CPT

## 2023-08-30 PROCEDURE — 85025 COMPLETE CBC W/AUTO DIFF WBC: CPT

## 2023-08-30 PROCEDURE — 85652 RBC SED RATE AUTOMATED: CPT

## 2023-08-31 LAB
ALBUMIN SERPL ELPH-MCNC: 3.93 G/DL (ref 3.2–5.1)
ALBUMIN SERPL ELPH-MCNC: 59.5 % (ref 48–70)
ALPHA1 GLOB SERPL ELPH-MCNC: 0.28 G/DL (ref 0.15–0.47)
ALPHA1 GLOB SERPL ELPH-MCNC: 4.2 % (ref 1.8–7)
ALPHA2 GLOB SERPL ELPH-MCNC: 0.64 G/DL (ref 0.42–1.04)
ALPHA2 GLOB SERPL ELPH-MCNC: 9.7 % (ref 5.9–14.9)
BETA GLOB ABNORMAL SERPL ELPH-MCNC: 0.43 G/DL (ref 0.31–0.57)
BETA1 GLOB SERPL ELPH-MCNC: 6.5 % (ref 4.7–7.7)
BETA2 GLOB SERPL ELPH-MCNC: 5.8 % (ref 3.1–7.9)
BETA2+GAMMA GLOB SERPL ELPH-MCNC: 0.38 G/DL (ref 0.2–0.58)
GAMMA GLOB ABNORMAL SERPL ELPH-MCNC: 0.94 G/DL (ref 0.4–1.66)
GAMMA GLOB SERPL ELPH-MCNC: 14.3 % (ref 6.9–22.3)
IGG/ALB SER: 1.47 {RATIO} (ref 1.1–1.8)
PROT PATTERN SERPL ELPH-IMP: NORMAL
PROT SERPL-MCNC: 6.6 G/DL (ref 6.4–8.4)

## 2023-08-31 PROCEDURE — 84165 PROTEIN E-PHORESIS SERUM: CPT | Performed by: PATHOLOGY

## 2023-09-06 ENCOUNTER — APPOINTMENT (OUTPATIENT)
Dept: LAB | Facility: CLINIC | Age: 68
End: 2023-09-06
Payer: MEDICARE

## 2023-09-06 DIAGNOSIS — Z72.0 TOBACCO ABUSE: ICD-10-CM

## 2023-09-06 DIAGNOSIS — Z12.5 SCREENING FOR PROSTATE CANCER: ICD-10-CM

## 2023-09-06 DIAGNOSIS — J43.2 CENTRILOBULAR EMPHYSEMA (HCC): ICD-10-CM

## 2023-09-06 DIAGNOSIS — I48.0 PAROXYSMAL ATRIAL FIBRILLATION (HCC): ICD-10-CM

## 2023-09-06 DIAGNOSIS — Z79.01 LONG TERM CURRENT USE OF ANTICOAGULANT THERAPY: ICD-10-CM

## 2023-09-06 DIAGNOSIS — D72.829 LEUKOCYTOSIS, UNSPECIFIED TYPE: ICD-10-CM

## 2023-09-06 DIAGNOSIS — E66.3 OVERWEIGHT (BMI 25.0-29.9): ICD-10-CM

## 2023-09-06 DIAGNOSIS — E78.2 MIXED HYPERLIPIDEMIA: ICD-10-CM

## 2023-09-06 DIAGNOSIS — I10 ESSENTIAL HYPERTENSION: ICD-10-CM

## 2023-09-06 DIAGNOSIS — J30.9 ALLERGIC RHINITIS, UNSPECIFIED SEASONALITY, UNSPECIFIED TRIGGER: ICD-10-CM

## 2023-09-06 DIAGNOSIS — E27.8 ADRENAL NODULE (HCC): ICD-10-CM

## 2023-09-06 DIAGNOSIS — E04.2 MULTIPLE THYROID NODULES: ICD-10-CM

## 2023-09-06 DIAGNOSIS — J32.9 RECURRENT SINUSITIS: ICD-10-CM

## 2023-09-06 DIAGNOSIS — R73.9 HYPERGLYCEMIA: ICD-10-CM

## 2023-09-06 LAB
ALBUMIN SERPL BCP-MCNC: 4 G/DL (ref 3.5–5)
ALP SERPL-CCNC: 58 U/L (ref 34–104)
ALT SERPL W P-5'-P-CCNC: 11 U/L (ref 7–52)
AMORPH URATE CRY URNS QL MICRO: ABNORMAL
ANION GAP SERPL CALCULATED.3IONS-SCNC: 5 MMOL/L
AST SERPL W P-5'-P-CCNC: 11 U/L (ref 13–39)
BACTERIA UR QL AUTO: ABNORMAL /HPF
BASOPHILS # BLD AUTO: 0.06 THOUSANDS/ÂΜL (ref 0–0.1)
BASOPHILS NFR BLD AUTO: 1 % (ref 0–1)
BILIRUB SERPL-MCNC: 0.97 MG/DL (ref 0.2–1)
BILIRUB UR QL STRIP: NEGATIVE
BUN SERPL-MCNC: 14 MG/DL (ref 5–25)
CALCIUM SERPL-MCNC: 9.1 MG/DL (ref 8.4–10.2)
CHLORIDE SERPL-SCNC: 105 MMOL/L (ref 96–108)
CHOLEST SERPL-MCNC: 96 MG/DL
CLARITY UR: ABNORMAL
CO2 SERPL-SCNC: 27 MMOL/L (ref 21–32)
COLOR UR: YELLOW
CREAT SERPL-MCNC: 0.69 MG/DL (ref 0.6–1.3)
EOSINOPHIL # BLD AUTO: 0.2 THOUSAND/ÂΜL (ref 0–0.61)
EOSINOPHIL NFR BLD AUTO: 2 % (ref 0–6)
ERYTHROCYTE [DISTWIDTH] IN BLOOD BY AUTOMATED COUNT: 13.1 % (ref 11.6–15.1)
EST. AVERAGE GLUCOSE BLD GHB EST-MCNC: 126 MG/DL
GFR SERPL CREATININE-BSD FRML MDRD: 98 ML/MIN/1.73SQ M
GLUCOSE P FAST SERPL-MCNC: 90 MG/DL (ref 65–99)
GLUCOSE UR STRIP-MCNC: NEGATIVE MG/DL
HBA1C MFR BLD: 6 %
HCT VFR BLD AUTO: 45.9 % (ref 36.5–49.3)
HDLC SERPL-MCNC: 27 MG/DL
HGB BLD-MCNC: 14.9 G/DL (ref 12–17)
HGB UR QL STRIP.AUTO: NEGATIVE
IMM GRANULOCYTES # BLD AUTO: 0.04 THOUSAND/UL (ref 0–0.2)
IMM GRANULOCYTES NFR BLD AUTO: 1 % (ref 0–2)
KETONES UR STRIP-MCNC: NEGATIVE MG/DL
LDLC SERPL CALC-MCNC: 57 MG/DL (ref 0–100)
LEUKOCYTE ESTERASE UR QL STRIP: NEGATIVE
LYMPHOCYTES # BLD AUTO: 1.68 THOUSANDS/ÂΜL (ref 0.6–4.47)
LYMPHOCYTES NFR BLD AUTO: 20 % (ref 14–44)
MCH RBC QN AUTO: 30.5 PG (ref 26.8–34.3)
MCHC RBC AUTO-ENTMCNC: 32.5 G/DL (ref 31.4–37.4)
MCV RBC AUTO: 94 FL (ref 82–98)
MONOCYTES # BLD AUTO: 0.69 THOUSAND/ÂΜL (ref 0.17–1.22)
MONOCYTES NFR BLD AUTO: 8 % (ref 4–12)
MUCOUS THREADS UR QL AUTO: ABNORMAL
NEUTROPHILS # BLD AUTO: 5.76 THOUSANDS/ÂΜL (ref 1.85–7.62)
NEUTS SEG NFR BLD AUTO: 68 % (ref 43–75)
NITRITE UR QL STRIP: NEGATIVE
NON-SQ EPI CELLS URNS QL MICRO: ABNORMAL /HPF
NRBC BLD AUTO-RTO: 0 /100 WBCS
PH UR STRIP.AUTO: 7.5 [PH]
PLATELET # BLD AUTO: 301 THOUSANDS/UL (ref 149–390)
PMV BLD AUTO: 9.5 FL (ref 8.9–12.7)
POTASSIUM SERPL-SCNC: 4.4 MMOL/L (ref 3.5–5.3)
PROT SERPL-MCNC: 6.6 G/DL (ref 6.4–8.4)
PROT UR STRIP-MCNC: ABNORMAL MG/DL
PSA SERPL-MCNC: 2.7 NG/ML (ref 0–4)
RBC # BLD AUTO: 4.89 MILLION/UL (ref 3.88–5.62)
RBC #/AREA URNS AUTO: ABNORMAL /HPF
SODIUM SERPL-SCNC: 137 MMOL/L (ref 135–147)
SP GR UR STRIP.AUTO: 1.01 (ref 1–1.03)
TRIGL SERPL-MCNC: 61 MG/DL
TSH SERPL DL<=0.05 MIU/L-ACNC: 0.7 UIU/ML (ref 0.45–4.5)
UROBILINOGEN UR STRIP-ACNC: <2 MG/DL
WBC # BLD AUTO: 8.43 THOUSAND/UL (ref 4.31–10.16)
WBC #/AREA URNS AUTO: ABNORMAL /HPF

## 2023-09-06 PROCEDURE — 80053 COMPREHEN METABOLIC PANEL: CPT

## 2023-09-06 PROCEDURE — 84443 ASSAY THYROID STIM HORMONE: CPT

## 2023-09-06 PROCEDURE — 83036 HEMOGLOBIN GLYCOSYLATED A1C: CPT

## 2023-09-06 PROCEDURE — 85025 COMPLETE CBC W/AUTO DIFF WBC: CPT

## 2023-09-06 PROCEDURE — 36415 COLL VENOUS BLD VENIPUNCTURE: CPT

## 2023-09-06 PROCEDURE — 80061 LIPID PANEL: CPT

## 2023-09-06 PROCEDURE — G0103 PSA SCREENING: HCPCS

## 2023-09-06 PROCEDURE — 81001 URINALYSIS AUTO W/SCOPE: CPT

## 2023-09-07 ENCOUNTER — RA CDI HCC (OUTPATIENT)
Dept: OTHER | Facility: HOSPITAL | Age: 68
End: 2023-09-07

## 2023-09-08 ENCOUNTER — TELEPHONE (OUTPATIENT)
Dept: HEMATOLOGY ONCOLOGY | Facility: CLINIC | Age: 68
End: 2023-09-08

## 2023-09-08 ENCOUNTER — OFFICE VISIT (OUTPATIENT)
Dept: HEMATOLOGY ONCOLOGY | Facility: CLINIC | Age: 68
End: 2023-09-08
Payer: MEDICARE

## 2023-09-08 VITALS
HEART RATE: 76 BPM | OXYGEN SATURATION: 98 % | HEIGHT: 73 IN | BODY MASS INDEX: 28.36 KG/M2 | SYSTOLIC BLOOD PRESSURE: 118 MMHG | WEIGHT: 214 LBS | TEMPERATURE: 97.6 F | RESPIRATION RATE: 18 BRPM | DIASTOLIC BLOOD PRESSURE: 72 MMHG

## 2023-09-08 DIAGNOSIS — Z79.01 LONG TERM (CURRENT) USE OF ANTICOAGULANTS: Primary | ICD-10-CM

## 2023-09-08 DIAGNOSIS — D72.9 NEUTROPHILIC LEUKOCYTOSIS: Primary | ICD-10-CM

## 2023-09-08 PROCEDURE — 99214 OFFICE O/P EST MOD 30 MIN: CPT | Performed by: INTERNAL MEDICINE

## 2023-09-08 NOTE — PROGRESS NOTES
Hematology/Oncology Outpatient Follow-up  Mirian Art 79 y.o. male 1955 [id number]    Date:  9/8/2023    Assessment and Plan:  1. Neutrophilic leukocytosis  The patient had work-up for the mild leukocytosis/neutrophilia which seems to be reactive in nature. The most recent CBC on 2 different occasions showed normal white cell count and normal white cell differential.  The rest of the work-up was negative for any obvious hint of malignant etiology. The patient was asked to consider smoking cessation and continue with the screening low-dose CT scan of the lung on a yearly basis. He also was asked to follow-up with his PCP on regular basis to monitor his blood count. We will be more than happy to see him on as-needed basis in the future. HPI:  Mirian Art is seen for initial consultation 5/5/2023 for evaluation of persistent leukocytosis. Mr. Eduardo Torres has underlying afib (on Eliquis for [facility] hypertension, hyperlipidemia, active tobacco abuse (started smoking at age 25, used to smoke 1 pack daily, now 1/2 pack daily), and pre-diabetes. Patient was referred to hematology team by PCP for further evaluation of persistent leukocytosis since August 2022. Interval history:  The patient came to her follow-up visit. He continues to smoke on a daily basis. He did have work-up for his chronic leukocytosis on 8/30/2023 which showed normal white cell count of 9.4 with normal white cell differential.  Hemoglobin was 15.2 with normal platelets. Dejan gammopathy work-up was negative. Creatinine was 0.7 with normal calcium and liver enzymes. LDH and inflammatory markers were within normal range. ROS: Review of Systems   Constitutional: Negative for chills and fever. HENT: Negative for ear pain and sore throat. Eyes: Negative for pain and visual disturbance. Respiratory: Negative for cough and shortness of breath. Cardiovascular: Negative for chest pain and palpitations. Gastrointestinal: Negative for abdominal pain and vomiting. Genitourinary: Negative for dysuria and hematuria. Musculoskeletal: Negative for arthralgias and back pain. Skin: Negative for color change and rash. Neurological: Negative for seizures and syncope. Psychiatric/Behavioral: Positive for sleep disturbance. All other systems reviewed and are negative. Past Medical History:   Diagnosis Date   • Atrial fibrillation ([address]) 06/26/2015   • Colon polyp    • HLD (hyperlipidemia)    • HTN (hypertension)    • Hypertension    • Multiple thyroid nodules    • Positive FIT (fecal immunochemical test)    • Pre-diabetes    • Vitamin D deficiency        Past Surgical History:   Procedure Laterality Date   • CARDIOVERSION  06/28/2015   • COLONOSCOPY     • US GUIDED THYROID BIOPSY  10/16/2020       Social History     Socioeconomic History   • Marital status: /Civil Union     Spouse name: None   • Number of children: None   • Years of education: None   • Highest education level: None   Occupational History   • Occupation: retired   Tobacco Use   • Smoking status: Every Day     Packs/day: 0.50     Types: Cigarettes     Start date: 1968   • Smokeless tobacco: Never   • Tobacco comments:     Patient smokes half pack a day now, however he used to smoke a pack a day earlier   Substance and Sexual Activity   • Alcohol use: Not Currently   • Drug use: Never   • Sexual activity: Yes     Partners: Female     Birth control/protection: None   Other Topics Concern   • None   Social History Narrative   • None     Social Determinants of Health     Financial Resource Strain: Low Risk  (8/31/2022)    Overall Financial Resource Strain (CARDIA)    • Difficulty of Paying Living Expenses: Not hard at all   Food Insecurity: Not on file   Transportation Needs: No Transportation Needs (8/31/2022)    PRAPARE - Transportation    • Lack of Transportation (Medical): No    • Lack of Transportation (Non-Medical):  No   Physical Activity: Not on file   Stress: Not on file   Social Connections: Not on file   Intimate Partner Violence: Not on file   Housing Stability: Not on file       Family History   Problem Relation Age of Onset   • Heart attack Father 61        MI   • Heart attack Paternal Uncle         MI       Allergies   Allergen Reactions   • No Active Allergies          Current Outpatient Medications:   •  amLODIPine (NORVASC) 5 mg tablet, TAKE 1 TABLET TWICE A DAY, Disp: 180 tablet, Rfl: 3  •  apixaban (Eliquis) 5 mg, Take 1 tablet (5 mg total) by mouth 2 (two) times a day, Disp: 180 tablet, Rfl: 4  •  atorvastatin (LIPITOR) 40 mg tablet, TAKE 1 TABLET DAILY, Disp: 90 tablet, Rfl: 3  •  Cholecalciferol (VITAMIN D3) 1000 units CAPS, Take 5,000 Units by mouth 3 (three) times a week, Disp: , Rfl:   •  Coenzyme Q10 (CO Q-10) 200 MG CAPS, Patient states that he takes this on a daily basis. , Disp: , Rfl:   •  Cyanocobalamin ER 1000 MCG TBCR, 1 daily, Disp: , Rfl:   •  flecainide (TAMBOCOR) 100 mg tablet, Take 100 mg by mouth every 12 (twelve) hours, Disp: , Rfl:   •  flecainide (TAMBOCOR) 50 mg tablet, Take 0.5 tablets (25 mg total) by mouth 2 (two) times a day Take half pill twice daily in addition to 100 mg twice daily pill pill twice daily. , Disp: 180 tablet, Rfl: 1  •  fluticasone (FLONASE) 50 mcg/act nasal spray, 2 sprays into each nostril daily, Disp: 9.9 mL, Rfl: 5  •  lisinopril (ZESTRIL) 20 mg tablet, TAKE 1 TABLET TWICE A DAY, Disp: 180 tablet, Rfl: 3  •  LORazepam (ATIVAN) 1 mg tablet, Take 1 tablet (1 mg total) by mouth 2 (two) times a day As needed for anxiety, Disp: 180 tablet, Rfl: 1  •  metoprolol tartrate (LOPRESSOR) 25 mg tablet, TAKE 1 TABLET TWICE A DAY, Disp: 180 tablet, Rfl: 3  •  Multiple Vitamins-Minerals (MULTIVITAMIN ADULT PO), take 1 by Oral route once, Disp: , Rfl:   •  selenium 200 mcg, take one tablet daily, Disp: , Rfl:   •  acyclovir (ZOVIRAX) 800 mg tablet, Take 1 tablet (800 mg total) by mouth 2 (two) times a day, Disp: 120 tablet, Rfl: 0  •  neomycin-polymyxin-hydrocortisone (CORTISPORIN) 0.35%-10,000 units/mL-1% otic suspension, Administer 3 drops to the right ear 4 (four) times a day for 5 days, Disp: 3 mL, Rfl: 0  •  ZINC PICOLINATE PO, Take 22 mg by mouth daily (Patient not taking: Reported on 3/13/2023), Disp: , Rfl:       Physical Exam:  /72 (BP Location: Left arm)   Pulse 76   Temp 97.6 °F (36.4 °C) (Tympanic)   Resp 18   Ht 6' 1" (1.854 m)   Wt 97.1 kg (214 lb)   SpO2 98%   BMI 28.23 kg/m²     Physical Exam  Constitutional:       Appearance: He is well-developed. HENT:      Head: Normocephalic and atraumatic. Nose: Nose normal.   Eyes:      General: No scleral icterus. Right eye: No discharge. Left eye: No discharge. Conjunctiva/sclera: Conjunctivae normal.      Pupils: Pupils are equal, round, and reactive to light. Neck:      Thyroid: No thyromegaly. Trachea: No tracheal deviation. Cardiovascular:      Rate and Rhythm: Normal rate and regular rhythm. Heart sounds: Normal heart sounds. No murmur heard. No friction rub. Pulmonary:      Effort: Pulmonary effort is normal. No respiratory distress. Breath sounds: Normal breath sounds. No wheezing or rales. Chest:      Chest wall: No tenderness. Abdominal:      General: There is no distension. Palpations: Abdomen is soft. There is no hepatomegaly or splenomegaly. Tenderness: There is no abdominal tenderness. There is no guarding or rebound. Musculoskeletal:         General: No tenderness or deformity. Normal range of motion. Cervical back: Normal range of motion and neck supple. Lymphadenopathy:      Cervical: No cervical adenopathy. Skin:     General: Skin is warm and dry. Coloration: Skin is not pale. Findings: No erythema or rash. Neurological:      Mental Status: He is alert and oriented to person, place, and time.       Cranial Nerves: No cranial nerve deficit. Coordination: Coordination normal.      Deep Tendon Reflexes: Reflexes are normal and symmetric. Psychiatric:         Behavior: Behavior normal.         Thought Content: Thought content normal.         Judgment: Judgment normal.           Labs:  Lab Results   Component Value Date    WBC 8.43 09/06/2023    HGB 14.9 09/06/2023    HCT 45.9 09/06/2023    MCV 94 09/06/2023     09/06/2023     Lab Results   Component Value Date    K 4.4 09/06/2023     09/06/2023    CO2 27 09/06/2023    BUN 14 09/06/2023    CREATININE 0.69 09/06/2023    GLUF 90 09/06/2023    CALCIUM 9.1 09/06/2023    AST 11 (L) 09/06/2023    ALT 11 09/06/2023    ALKPHOS 58 09/06/2023    EGFR 98 09/06/2023       Patient voiced understanding and agreement in the above discussion. Aware to contact our office with questions/symptoms in the interim.

## 2023-09-08 NOTE — TELEPHONE ENCOUNTER
Call Transfer   Who are you speaking with? Patient   If it is not the patient, are they listed on an active communication consent form? N/A   Who is the patients HemOnc/SurgOnc provider? Dr. Emilie Mejia   What is the reason for this call? Patient calling in wanting to speak with Debora Wilson to make sure that Eliquis was not sent to his pharmacy. Patient was informed no medications were sent to the pharmacy. Person/Department that the call was transferred to? Time that call was transferred? Debora Wilson @ 2:27PM   Your call will be transferred now. If you receive a voicemail, please leave a detailed message and a member of the team will return your call as soon as possible. Did you relay this information to the caller?   Yes

## 2023-09-11 ENCOUNTER — OFFICE VISIT (OUTPATIENT)
Dept: FAMILY MEDICINE CLINIC | Facility: CLINIC | Age: 68
End: 2023-09-11
Payer: MEDICARE

## 2023-09-11 VITALS
HEIGHT: 73 IN | TEMPERATURE: 97.8 F | SYSTOLIC BLOOD PRESSURE: 104 MMHG | HEART RATE: 60 BPM | RESPIRATION RATE: 14 BRPM | BODY MASS INDEX: 28.34 KG/M2 | DIASTOLIC BLOOD PRESSURE: 70 MMHG | WEIGHT: 213.8 LBS

## 2023-09-11 DIAGNOSIS — J43.2 CENTRILOBULAR EMPHYSEMA (HCC): ICD-10-CM

## 2023-09-11 DIAGNOSIS — E78.2 MIXED HYPERLIPIDEMIA: ICD-10-CM

## 2023-09-11 DIAGNOSIS — N40.1 BPH ASSOCIATED WITH NOCTURIA: ICD-10-CM

## 2023-09-11 DIAGNOSIS — E66.3 OVERWEIGHT (BMI 25.0-29.9): ICD-10-CM

## 2023-09-11 DIAGNOSIS — R79.89 ELEVATED BRAIN NATRIURETIC PEPTIDE (BNP) LEVEL: ICD-10-CM

## 2023-09-11 DIAGNOSIS — I48.0 PAROXYSMAL ATRIAL FIBRILLATION (HCC): ICD-10-CM

## 2023-09-11 DIAGNOSIS — E04.2 MULTIPLE THYROID NODULES: Primary | ICD-10-CM

## 2023-09-11 DIAGNOSIS — Z87.891 PERSONAL HISTORY OF NICOTINE DEPENDENCE: ICD-10-CM

## 2023-09-11 DIAGNOSIS — Z79.01 LONG TERM CURRENT USE OF ANTICOAGULANT THERAPY: ICD-10-CM

## 2023-09-11 DIAGNOSIS — F41.9 ANXIETY: ICD-10-CM

## 2023-09-11 DIAGNOSIS — I10 ESSENTIAL HYPERTENSION: ICD-10-CM

## 2023-09-11 DIAGNOSIS — E27.8 ADRENAL NODULE (HCC): ICD-10-CM

## 2023-09-11 DIAGNOSIS — R73.9 HYPERGLYCEMIA: ICD-10-CM

## 2023-09-11 DIAGNOSIS — Z72.0 TOBACCO ABUSE: ICD-10-CM

## 2023-09-11 DIAGNOSIS — D72.829 LEUKOCYTOSIS, UNSPECIFIED TYPE: ICD-10-CM

## 2023-09-11 DIAGNOSIS — Z00.00 HEALTHCARE MAINTENANCE: ICD-10-CM

## 2023-09-11 DIAGNOSIS — R35.1 BPH ASSOCIATED WITH NOCTURIA: ICD-10-CM

## 2023-09-11 PROBLEM — Z12.5 SCREENING FOR PROSTATE CANCER: Status: RESOLVED | Noted: 2022-08-31 | Resolved: 2023-09-11

## 2023-09-11 PROCEDURE — 99214 OFFICE O/P EST MOD 30 MIN: CPT | Performed by: FAMILY MEDICINE

## 2023-09-11 PROCEDURE — G0438 PPPS, INITIAL VISIT: HCPCS | Performed by: FAMILY MEDICINE

## 2023-09-11 RX ORDER — TAMSULOSIN HYDROCHLORIDE 0.4 MG/1
0.4 CAPSULE ORAL
Qty: 30 CAPSULE | Refills: 5 | Status: SHIPPED | OUTPATIENT
Start: 2023-09-11

## 2023-09-11 RX ORDER — LORAZEPAM 1 MG/1
1 TABLET ORAL 2 TIMES DAILY
Qty: 180 TABLET | Refills: 1 | Status: SHIPPED | OUTPATIENT
Start: 2023-09-11

## 2023-09-11 NOTE — PROGRESS NOTES
Assessment and Plan:     1. Thyroid nodule/renal nodule, stable follows with endocrinology #2. Anxiety, PDMP reviewed, lorazepam reordered  3. Emphysema, PFT without beta agonist ordered  4. P-hbx-oeuacvjesqjojb/elevated BNP, stable follows with cardiology #5. Hypertension, stable continue present therapy  6. Hyperlipidemia, stable continue present therapy #7. Tobacco abuse, complete cessation recommended CT screening was ordered  8. Hyperglycemia low sugar low carbohydrate diet recommended  9. Leukocytosis, resolved, Dr. Laisha Galindo note from 9/8/2023 was reviewed. No follow-up is needed  10. Healthcare maintenance Medicare AWV completed. Patient should go to his local pharmacy for shingles vaccination  11. Return in 6 months for office visit and blood work sooner if needed  12. BPH with nocturia, PSA is normal, will start Flomax if not improved in 1 to 2 weeks please call office      Problem List Items Addressed This Visit        Endocrine    Multiple thyroid nodules - Primary     Patient to follow with endocrinology         Relevant Orders    CBC    Comprehensive metabolic panel    Hemoglobin A1C    Lipid Panel with Direct LDL reflex    TSH, 3rd generation with Free T4 reflex    UA (URINE) with reflex to Scope       Respiratory    Centrilobular emphysema (HCC)     Patient declines any albuterol or beta agonist because of his A-fib.   Will order spirometry         Relevant Orders    Complete PFT without post bronchodilator    CBC    Comprehensive metabolic panel    Hemoglobin A1C    Lipid Panel with Direct LDL reflex    TSH, 3rd generation with Free T4 reflex    UA (URINE) with reflex to Scope       Cardiovascular and Mediastinum    Paroxysmal atrial fibrillation (HCC)     Stable follows with cardiology         Relevant Orders    CBC    Comprehensive metabolic panel    Hemoglobin A1C    Lipid Panel with Direct LDL reflex    TSH, 3rd generation with Free T4 reflex    UA (URINE) with reflex to Scope Essential hypertension     Able continue present therapy         Relevant Orders    CBC    Comprehensive metabolic panel    Hemoglobin A1C    Lipid Panel with Direct LDL reflex    TSH, 3rd generation with Free T4 reflex    UA (URINE) with reflex to Scope       Other    Mixed hyperlipidemia     Able continue atorvastatin 40 mg daily         Relevant Orders    CBC    Comprehensive metabolic panel    Hemoglobin A1C    Lipid Panel with Direct LDL reflex    TSH, 3rd generation with Free T4 reflex    UA (URINE) with reflex to Scope    Adrenal nodule (HCC)     Stable follows with endocrinology         Relevant Orders    CBC    Comprehensive metabolic panel    Hemoglobin A1C    Lipid Panel with Direct LDL reflex    TSH, 3rd generation with Free T4 reflex    UA (URINE) with reflex to Scope    Anxiety     Stable Ativan reordered, PDMP was reviewed         Relevant Medications    LORazepam (ATIVAN) 1 mg tablet    Other Relevant Orders    CBC    Comprehensive metabolic panel    Hemoglobin A1C    Lipid Panel with Direct LDL reflex    TSH, 3rd generation with Free T4 reflex    UA (URINE) with reflex to Scope    Long term current use of anticoagulant therapy     Continue Eliquis per cardiology         Relevant Orders    CBC    Comprehensive metabolic panel    Hemoglobin A1C    Lipid Panel with Direct LDL reflex    TSH, 3rd generation with Free T4 reflex    UA (URINE) with reflex to Scope    Tobacco abuse     T screen ordered for December, complete cessation recommended         Relevant Orders    CT lung screening program    CBC    Comprehensive metabolic panel    Hemoglobin A1C    Lipid Panel with Direct LDL reflex    TSH, 3rd generation with Free T4 reflex    UA (URINE) with reflex to Scope    Healthcare maintenance     Medicare AWV completed.   Patient should follow-up at his local pharmacy for shingles vaccination         Relevant Orders    CBC    Comprehensive metabolic panel    Hemoglobin A1C    Lipid Panel with Direct LDL reflex    TSH, 3rd generation with Free T4 reflex    UA (URINE) with reflex to Scope    Elevated brain natriuretic peptide (BNP) level     Follows with cardiology         Relevant Orders    CBC    Comprehensive metabolic panel    Hemoglobin A1C    Lipid Panel with Direct LDL reflex    TSH, 3rd generation with Free T4 reflex    UA (URINE) with reflex to Scope    Overweight (BMI 25.0-29. 9)     BMI 28.21 diet exercise weight loss recommended         Relevant Orders    CBC    Comprehensive metabolic panel    Hemoglobin A1C    Lipid Panel with Direct LDL reflex    TSH, 3rd generation with Free T4 reflex    UA (URINE) with reflex to Scope    Hyperglycemia     Low sugar low carbohydrate diet recommended         Relevant Orders    CBC    Comprehensive metabolic panel    Hemoglobin A1C    Lipid Panel with Direct LDL reflex    TSH, 3rd generation with Free T4 reflex    UA (URINE) with reflex to Scope    Leukocytosis     Per hematology, Dr. Srinivas Montero, this has normalized no further follow-up is needed         Relevant Orders    CBC    Comprehensive metabolic panel    Hemoglobin A1C    Lipid Panel with Direct LDL reflex    TSH, 3rd generation with Free T4 reflex    UA (URINE) with reflex to Scope    BPH associated with nocturia     PSA is normal we will start Flomax return in 1 to 2 weeks if still with symptoms        Other Visit Diagnoses     Personal history of nicotine dependence        Relevant Orders    CT lung screening program    CBC    Comprehensive metabolic panel    Hemoglobin A1C    Lipid Panel with Direct LDL reflex    TSH, 3rd generation with Free T4 reflex    UA (URINE) with reflex to Scope          Depression Screening and Follow-up Plan: Patient was screened for depression during today's encounter. They screened negative with a PHQ-2 score of 0. Preventive health issues were discussed with patient, and age appropriate screening tests were ordered as noted in patient's After Visit Summary.   Personalized health advice and appropriate referrals for health education or preventive services given if needed, as noted in patient's After Visit Summary. History of Present Illness:     Patient presents for a Medicare Wellness Visit    Patient doing well he did have 2 episodes of atrial fibrillation was seen by cardiology flecainide was increased. Blood work was discussed with the patient. Patient Care Team:  Tanner Saab DO as PCP - General (Family Medicine)  Chapo Chong DO as PCP - [address] [place] [address] (RTE)     Review of Systems:     Review of Systems   Constitutional: Negative. HENT: Negative. Eyes: Negative. Respiratory: Negative. Cardiovascular:        HPI   Gastrointestinal: Negative. Endocrine: Negative. Genitourinary:        When patient was leaving patient did state that he has to get up at night to urinate. Would like medication   Musculoskeletal: Negative. Skin: Negative. Allergic/Immunologic: Negative. Neurological: Negative. Hematological: Negative. Psychiatric/Behavioral: Negative. Problem List:     Patient Active Problem List   Diagnosis   • Paroxysmal atrial fibrillation (HCC)   • Mixed hyperlipidemia   • Adrenal nodule (HCC)   • Anxiety   • Multiple thyroid nodules   • Long term current use of anticoagulant therapy   • Essential hypertension   • Tobacco abuse   • Screening for colon cancer   • Healthcare maintenance   • Elevated brain natriuretic peptide (BNP) level   • Overweight (BMI 25.0-29. 9)   • Medication management contract agreement   • Hyperglycemia   • Centrilobular emphysema (HCC)   • Allergic rhinitis   • Leukocytosis   • BPH associated with nocturia      Past Medical and Surgical History:     Past Medical History:   Diagnosis Date   • Atrial fibrillation ([address]) 06/26/2015   • Colon polyp    • HLD (hyperlipidemia)    • HTN (hypertension)    • Hypertension    • Multiple thyroid nodules    • Positive FIT (fecal immunochemical test)    • Pre-diabetes    • Vitamin D deficiency      Past Surgical History:   Procedure Laterality Date   • CARDIOVERSION  06/28/2015   • COLONOSCOPY     • US GUIDED THYROID BIOPSY  10/16/2020      Family History:     Family History   Problem Relation Age of Onset   • Heart attack Father 61        MI   • Heart attack Paternal Uncle         MI      Social History:     Social History     Socioeconomic History   • Marital status: /Civil Union     Spouse name: None   • Number of children: None   • Years of education: None   • Highest education level: None   Occupational History   • Occupation: retired   Tobacco Use   • Smoking status: Every Day     Packs/day: 0.50     Types: Cigarettes     Start date: 1968   • Smokeless tobacco: Never   • Tobacco comments:     Patient smokes half pack a day now, however he used to smoke a pack a day earlier   Substance and Sexual Activity   • Alcohol use: Not Currently   • Drug use: Never   • Sexual activity: Yes     Partners: Female     Birth control/protection: None   Other Topics Concern   • None   Social History Narrative   • None     Social Determinants of Health     Financial Resource Strain: Low Risk  (9/11/2023)    Overall Financial Resource Strain (CARDIA)    • Difficulty of Paying Living Expenses: Not hard at all   Food Insecurity: Not on file   Transportation Needs: No Transportation Needs (9/11/2023)    PRAPARE - Transportation    • Lack of Transportation (Medical): No    • Lack of Transportation (Non-Medical):  No   Physical Activity: Not on file   Stress: Not on file   Social Connections: Not on file   Intimate Partner Violence: Not on file   Housing Stability: Not on file      Medications and Allergies:     Current Outpatient Medications   Medication Sig Dispense Refill   • acyclovir (ZOVIRAX) 800 mg tablet Take 1 tablet (800 mg total) by mouth 2 (two) times a day (Patient taking differently: Take 800 mg by mouth if needed) 120 tablet 0   • amLODIPine (NORVASC) 5 mg tablet TAKE 1 TABLET TWICE A  tablet 3   • apixaban (Eliquis) 5 mg Take 1 tablet (5 mg total) by mouth 2 (two) times a day 180 tablet 4   • atorvastatin (LIPITOR) 40 mg tablet TAKE 1 TABLET DAILY 90 tablet 3   • Cholecalciferol (VITAMIN D3) 1000 units CAPS Take 5,000 Units by mouth 3 (three) times a week     • Coenzyme Q10 (CO Q-10) 200 MG CAPS Patient states that he takes this on a daily basis. • Cyanocobalamin ER 1000 MCG TBCR 1 daily     • flecainide (TAMBOCOR) 100 mg tablet Take 100 mg by mouth every 12 (twelve) hours     • flecainide (TAMBOCOR) 50 mg tablet Take 0.5 tablets (25 mg total) by mouth 2 (two) times a day Take half pill twice daily in addition to 100 mg twice daily pill pill twice daily. 180 tablet 1   • fluticasone (FLONASE) 50 mcg/act nasal spray 2 sprays into each nostril daily 9.9 mL 5   • lisinopril (ZESTRIL) 20 mg tablet TAKE 1 TABLET TWICE A  tablet 3   • LORazepam (ATIVAN) 1 mg tablet Take 1 tablet (1 mg total) by mouth 2 (two) times a day As needed for anxiety 180 tablet 1   • metoprolol tartrate (LOPRESSOR) 25 mg tablet TAKE 1 TABLET TWICE A  tablet 3   • Multiple Vitamins-Minerals (MULTIVITAMIN ADULT PO) take 1 by Oral route once     • selenium 200 mcg take one tablet daily     • ZINC PICOLINATE PO Take 22 mg by mouth daily     • apixaban (Eliquis) 5 mg Take 1 tablet (5 mg total) by mouth 2 (two) times a day (Patient not taking: Reported on 9/11/2023) 56 tablet 0   • neomycin-polymyxin-hydrocortisone (CORTISPORIN) 0.35%-10,000 units/mL-1% otic suspension Administer 3 drops to the right ear 4 (four) times a day for 5 days 3 mL 0     No current facility-administered medications for this visit.      Allergies   Allergen Reactions   • No Active Allergies       Immunizations:     Immunization History   Administered Date(s) Administered   • COVID-19 MODERNA VACC 0.5 ML IM 01/26/2021, 03/04/2021   • Fluzone Split Quad 0.5 mL 11/10/2017   • INFLUENZA 11/10/2017, 11/04/2018, 10/13/2020   • Influenza, Seasonal Vaccine, Quadrivalent, Adjuvanted, . 5e 10/30/2022   • Influenza, high dose seasonal 0.7 mL 10/13/2020   • Influenza, injectable, quadrivalent, preservative free 0.5 mL 11/04/2018, 10/31/2019   • Pneumococcal Conjugate 13-Valent 08/13/2020   • Pneumococcal Polysaccharide PPV23 08/26/2021   • Tdap 02/13/2020      Health Maintenance:         Topic Date Due   • Colorectal Cancer Screening  11/17/2025   • Hepatitis C Screening  Completed   • Lung Cancer Screening  Discontinued         Topic Date Due   • Hepatitis A Vaccine (1 of 2 - Risk 2-dose series) Never done   • Hepatitis B Vaccine (1 of 3 - Risk 3-dose series) Never done   • COVID-19 Vaccine (3 - Moderna series) 04/29/2021   • Influenza Vaccine (1) 09/01/2023      Medicare Screening Tests and Risk Assessments:     Shawna Seymour is here for his Subsequent Wellness visit. Health Risk Assessment:   Patient rates overall health as very good. Patient feels that their physical health rating is same. Eyesight was rated as same. Hearing was rated as same. Patient feels that their emotional and mental health rating is same. Patients states they are never, rarely angry. Patient states they are never, rarely unusually tired/fatigued. Pain experienced in the last 7 days has been none. Patient states that he has experienced no weight loss or gain in last 6 months. Depression Screening:   PHQ-2 Score: 0      Fall Risk Screening: In the past year, patient has experienced: no history of falling in past year      Home Safety:  Patient does not have trouble with stairs inside or outside of their home. Patient has working smoke alarms and has no working carbon monoxide detector. Home safety hazards include: none. Nutrition:   Current diet is Regular. Medications:   Patient is currently taking over-the-counter supplements. OTC medications include: see medication list. Patient is able to manage medications.      Activities of Daily Living (ADLs)/Instrumental Activities of Daily Living (IADLs):   Walk and transfer into and out of bed and chair?: Yes  Dress and groom yourself?: Yes    Bathe or shower yourself?: Yes    Feed yourself? Yes  Do your laundry/housekeeping?: Yes  Manage your money, pay your bills and track your expenses?: Yes  Make your own meals?: Yes    Do your own shopping?: Yes    Previous Hospitalizations:   Any hospitalizations or ED visits within the last 12 months?: No      Advance Care Planning:   Living will: Yes    Durable POA for healthcare: Yes    Advanced directive: Yes    Advanced directive counseling given: Yes    Five wishes given: No    Patient declined ACP directive: No    End of Life Decisions reviewed with patient: Yes    Provider agrees with end of life decisions: Yes      Cognitive Screening:   Provider or family/friend/caregiver concerned regarding cognition?: No    PREVENTIVE SCREENINGS      Cardiovascular Screening:    General: Screening Not Indicated and History Lipid Disorder      Diabetes Screening:     General: Screening Current      Colorectal Cancer Screening:     General: Screening Current      Prostate Cancer Screening:    General: Screening Current      Osteoporosis Screening:    General: Screening Not Indicated      Abdominal Aortic Aneurysm (AAA) Screening:    Risk factors include: age between 70-75 yo and tobacco use        Lung Cancer Screening:     General: Screening Not Indicated      Hepatitis C Screening:    General: Screening Current    Screening, Brief Intervention, and Referral to Treatment (SBIRT)    Screening  Typical number of drinks in a day: 0  Typical number of drinks in a week: 0  Interpretation: Low risk drinking behavior. No results found.      Physical Exam:     /70 (BP Location: Right arm, Patient Position: Sitting, Cuff Size: Adult)   Pulse 60   Temp 97.8 °F (36.6 °C) (Temporal)   Resp 14   Ht 6' 1" (1.854 m)   Wt 97 kg (213 lb 12.8 oz)   BMI 28.21 kg/m²     Physical Exam  Vitals and nursing note reviewed. Constitutional:       Appearance: Normal appearance. HENT:      Head: Normocephalic and atraumatic. Mouth/Throat:      Mouth: Mucous membranes are moist.   Eyes:      General: No scleral icterus. Neck:      Vascular: No carotid bruit. Cardiovascular:      Rate and Rhythm: Normal rate and regular rhythm. Heart sounds: Normal heart sounds. Pulmonary:      Effort: Pulmonary effort is normal.      Breath sounds: Normal breath sounds. Abdominal:      General: Bowel sounds are normal.      Palpations: Abdomen is soft. Tenderness: There is no abdominal tenderness. Musculoskeletal:      Cervical back: Neck supple. Right lower leg: No edema. Left lower leg: No edema. Skin:     General: Skin is warm and dry. Neurological:      General: No focal deficit present. Mental Status: He is alert.    Psychiatric:         Mood and Affect: Mood normal.          Herminio Luna DO

## 2023-09-11 NOTE — PATIENT INSTRUCTIONS
Follow with endocrinology and cardiology per their recommendations  Complete pulmonary function testing, I did order without beta agonist  Complete CT screening of chest in December  I recommend complete tobacco cessation  Low sugar low carbohydrate diet recommended  Follow with your local pharmacy for shingles vaccination  Return in 6 months for office visit and blood work sooner if needed

## 2023-09-13 ENCOUNTER — TELEPHONE (OUTPATIENT)
Dept: CARDIOLOGY CLINIC | Facility: CLINIC | Age: 68
End: 2023-09-13

## 2023-09-13 NOTE — TELEPHONE ENCOUNTER
Return phone call to the patient. Advised him that he can discontinue taking the amlodipine for now and check his blood pressures. He can take the Flomax medication. He can restart taking amlodipine if he notices that his systolic blood pressures consistently at or greater than 381 mmHg or diastolic blood pressures greater than 80 mmHg. Advised him to continue monitoring intermittent blood pressures. And reach out to us if he has any concerning symptoms or notices blood pressure being elevated.     Kendy Johns MD

## 2023-10-06 ENCOUNTER — APPOINTMENT (OUTPATIENT)
Dept: LAB | Facility: CLINIC | Age: 68
End: 2023-10-06
Payer: MEDICARE

## 2023-10-06 DIAGNOSIS — I48.91 ATRIAL FIBRILLATION WITH RVR (HCC): ICD-10-CM

## 2023-10-06 DIAGNOSIS — I48.0 PAROXYSMAL ATRIAL FIBRILLATION (HCC): ICD-10-CM

## 2023-10-06 DIAGNOSIS — Z79.01 LONG TERM CURRENT USE OF ANTICOAGULANT THERAPY: ICD-10-CM

## 2023-10-06 DIAGNOSIS — E78.2 HYPERLIPIDEMIA, MIXED: ICD-10-CM

## 2023-10-06 LAB
ALBUMIN SERPL BCP-MCNC: 4.1 G/DL (ref 3.5–5)
ALP SERPL-CCNC: 62 U/L (ref 34–104)
ALT SERPL W P-5'-P-CCNC: 14 U/L (ref 7–52)
ANION GAP SERPL CALCULATED.3IONS-SCNC: 9 MMOL/L
AST SERPL W P-5'-P-CCNC: 11 U/L (ref 13–39)
BASOPHILS # BLD AUTO: 0.08 THOUSANDS/ÂΜL (ref 0–0.1)
BASOPHILS NFR BLD AUTO: 1 % (ref 0–1)
BILIRUB SERPL-MCNC: 0.88 MG/DL (ref 0.2–1)
BUN SERPL-MCNC: 18 MG/DL (ref 5–25)
CALCIUM SERPL-MCNC: 9.1 MG/DL (ref 8.4–10.2)
CHLORIDE SERPL-SCNC: 105 MMOL/L (ref 96–108)
CHOLEST SERPL-MCNC: 104 MG/DL
CO2 SERPL-SCNC: 25 MMOL/L (ref 21–32)
CREAT SERPL-MCNC: 0.68 MG/DL (ref 0.6–1.3)
EOSINOPHIL # BLD AUTO: 0.23 THOUSAND/ÂΜL (ref 0–0.61)
EOSINOPHIL NFR BLD AUTO: 2 % (ref 0–6)
ERYTHROCYTE [DISTWIDTH] IN BLOOD BY AUTOMATED COUNT: 13.2 % (ref 11.6–15.1)
GFR SERPL CREATININE-BSD FRML MDRD: 98 ML/MIN/1.73SQ M
GLUCOSE P FAST SERPL-MCNC: 93 MG/DL (ref 65–99)
HCT VFR BLD AUTO: 44.9 % (ref 36.5–49.3)
HDLC SERPL-MCNC: 27 MG/DL
HGB BLD-MCNC: 15 G/DL (ref 12–17)
IMM GRANULOCYTES # BLD AUTO: 0.04 THOUSAND/UL (ref 0–0.2)
IMM GRANULOCYTES NFR BLD AUTO: 0 % (ref 0–2)
LDLC SERPL CALC-MCNC: 63 MG/DL (ref 0–100)
LYMPHOCYTES # BLD AUTO: 1.76 THOUSANDS/ÂΜL (ref 0.6–4.47)
LYMPHOCYTES NFR BLD AUTO: 18 % (ref 14–44)
MAGNESIUM SERPL-MCNC: 2.2 MG/DL (ref 1.9–2.7)
MCH RBC QN AUTO: 31.3 PG (ref 26.8–34.3)
MCHC RBC AUTO-ENTMCNC: 33.4 G/DL (ref 31.4–37.4)
MCV RBC AUTO: 94 FL (ref 82–98)
MONOCYTES # BLD AUTO: 0.68 THOUSAND/ÂΜL (ref 0.17–1.22)
MONOCYTES NFR BLD AUTO: 7 % (ref 4–12)
NEUTROPHILS # BLD AUTO: 7.09 THOUSANDS/ÂΜL (ref 1.85–7.62)
NEUTS SEG NFR BLD AUTO: 72 % (ref 43–75)
NONHDLC SERPL-MCNC: 77 MG/DL
NRBC BLD AUTO-RTO: 0 /100 WBCS
PLATELET # BLD AUTO: 337 THOUSANDS/UL (ref 149–390)
PMV BLD AUTO: 9.5 FL (ref 8.9–12.7)
POTASSIUM SERPL-SCNC: 4 MMOL/L (ref 3.5–5.3)
PROT SERPL-MCNC: 6.9 G/DL (ref 6.4–8.4)
RBC # BLD AUTO: 4.8 MILLION/UL (ref 3.88–5.62)
SODIUM SERPL-SCNC: 139 MMOL/L (ref 135–147)
TRIGL SERPL-MCNC: 71 MG/DL
WBC # BLD AUTO: 9.88 THOUSAND/UL (ref 4.31–10.16)

## 2023-10-06 PROCEDURE — 80053 COMPREHEN METABOLIC PANEL: CPT

## 2023-10-06 PROCEDURE — 85025 COMPLETE CBC W/AUTO DIFF WBC: CPT

## 2023-10-06 PROCEDURE — 83880 ASSAY OF NATRIURETIC PEPTIDE: CPT

## 2023-10-06 PROCEDURE — 80061 LIPID PANEL: CPT

## 2023-10-06 PROCEDURE — 36415 COLL VENOUS BLD VENIPUNCTURE: CPT

## 2023-10-11 ENCOUNTER — HOSPITAL ENCOUNTER (OUTPATIENT)
Dept: PULMONOLOGY | Facility: HOSPITAL | Age: 68
Discharge: HOME | End: 2023-10-11
Payer: MEDICARE

## 2023-10-11 DIAGNOSIS — J43.2 CENTRILOBULAR EMPHYSEMA (HCC): ICD-10-CM

## 2023-10-11 PROCEDURE — 94760 N-INVAS EAR/PLS OXIMETRY 1: CPT

## 2023-10-11 PROCEDURE — 94726 PLETHYSMOGRAPHY LUNG VOLUMES: CPT | Performed by: INTERNAL MEDICINE

## 2023-10-11 PROCEDURE — 94729 DIFFUSING CAPACITY: CPT | Performed by: INTERNAL MEDICINE

## 2023-10-11 PROCEDURE — 94010 BREATHING CAPACITY TEST: CPT

## 2023-10-11 PROCEDURE — 94010 BREATHING CAPACITY TEST: CPT | Performed by: INTERNAL MEDICINE

## 2023-10-11 PROCEDURE — 94726 PLETHYSMOGRAPHY LUNG VOLUMES: CPT

## 2023-10-11 PROCEDURE — 94729 DIFFUSING CAPACITY: CPT

## 2023-10-12 ENCOUNTER — OFFICE VISIT (OUTPATIENT)
Dept: CARDIOLOGY CLINIC | Facility: CLINIC | Age: 68
End: 2023-10-12
Payer: MEDICARE

## 2023-10-12 ENCOUNTER — TELEPHONE (OUTPATIENT)
Dept: CARDIOLOGY CLINIC | Facility: CLINIC | Age: 68
End: 2023-10-12

## 2023-10-12 VITALS
SYSTOLIC BLOOD PRESSURE: 105 MMHG | DIASTOLIC BLOOD PRESSURE: 64 MMHG | BODY MASS INDEX: 28.89 KG/M2 | HEART RATE: 60 BPM | WEIGHT: 219 LBS

## 2023-10-12 DIAGNOSIS — E78.2 MIXED HYPERLIPIDEMIA: ICD-10-CM

## 2023-10-12 DIAGNOSIS — Z79.01 LONG TERM CURRENT USE OF ANTICOAGULANT THERAPY: ICD-10-CM

## 2023-10-12 DIAGNOSIS — Z79.899 LONG TERM CURRENT USE OF ANTIARRHYTHMIC DRUG: ICD-10-CM

## 2023-10-12 DIAGNOSIS — I48.0 PAROXYSMAL ATRIAL FIBRILLATION (HCC): Primary | ICD-10-CM

## 2023-10-12 DIAGNOSIS — Z72.0 TOBACCO ABUSE: ICD-10-CM

## 2023-10-12 DIAGNOSIS — I10 ESSENTIAL HYPERTENSION: ICD-10-CM

## 2023-10-12 PROCEDURE — 99214 OFFICE O/P EST MOD 30 MIN: CPT | Performed by: INTERNAL MEDICINE

## 2023-10-12 PROCEDURE — 93000 ELECTROCARDIOGRAM COMPLETE: CPT | Performed by: INTERNAL MEDICINE

## 2023-10-12 NOTE — PATIENT INSTRUCTIONS
CARDIOLOGY ASSESSMENT & PLAN     1. Paroxysmal atrial fibrillation (HCC)  POCT ECG      2. Long term current use of anticoagulant therapy        3. Mixed hyperlipidemia        4. Tobacco abuse        5. Essential hypertension        6. Long term current use of antiarrhythmic drug          Paroxysmal atrial fibrillation [facility])  Mr. Gardner Late overall doing well from cardiac perspective with no recent symptoms of atrial fibrillation. He is on antiarrhythmic therapy with flecainide and his ECG is normal.  His renal function and electrolytes are normal.  Physical examination is overall unremarkable. -- At this time I am advising him to continue current medications. -- We will plan to see him back in 6 to 8 months. -- Dietary and medical compliance are reinforced. -- He is advised  to report any concerning symptoms such as chest pain, shortness of breath, decline in exercise tolerance or presyncope/syncope.

## 2023-10-12 NOTE — PROGRESS NOTES
[facility]  [address] [facility] [address] [facility]  Phone#  [phone]. Fax#  [phone]  *-*-*-*-*-*-*-*-*-*-*-*-*-*-*-*-*-*-*-*-*-*-*-*-*-*-*-*-*-*-*-*-*-*-*-*-*-*-*-*-*-*-*-*-*-*-*-*-*-*-*-*-*-*  ENCOUNTER DATE: 10/12/23 11:04 AM  PATIENT NAME: Ethan Gonzalez   1955    [id number]  AGE:68 y.o. SEX: male  ENCOUNTER PROVIDER:Carlita Dietz     PRIMARY CARE PHYSICIAN: Herminio Luna DO    DIAGNOSES:  1. Paroxysmal atrial fibrillation status post cardioversion in 2015   2. Mixed dyslipidemia  3. Benign essential hypertension  4. Chronic tobacco use  5. Suspected sleep apnea  6. Diverticular disease without diverticulitis  7. Adrenal adenoma  8. Multiple thyroid nodules    REGADENOSON NUCLEAR STRESS TEST 3/20/2023:   ·  Stress ECG: The stress ECG is negative for ischemia after pharmacologic vasodilation, without reproduction of symptoms. ·  Perfusion: There is a left ventricular perfusion defect with mild reduction in uptake present in the entire inferior location(s) that is paradoxical.  ·  Stress Function: Left ventricular function post-stress is normal. Post-stress ejection fraction is 57 %. ·  Stress Combined Conclusion: The ECG and SPECT imaging portions of the stress study are concordant with no evidence of stress induced myocardial ischemia. There is image artifact, without diagnostic evidence for perfusion abnormality. ECHOCARDIOGRAM 12/30/2022:  Mildly dilated left ventricle cavity, normal wall thickness, normal left ventricle systolic function, normal left ventricle diastolic function, EF 96%, not mildly dilated right ventricle, normal right ventricle systolic function, mild left atrial enlargement, normal right atrial size, normal aortic valve without stenosis or regurgitation, normal mitral valve without stenosis or regurgitation, trace tricuspid valve regurgitation, mild pulmonic valve regurgitation, no pericardial effusion.   Mild ectasia of ascending aorta measuring up to 3.9 cm. PERSANTINE NUCLEAR STRESS TEST in July 2015 showed normal perfusion and normal left ventricular systolic function with mildly dilated left ventricular cavity, EF was 58%. ECHOCARDIOGRAM June 2018:  - Mild left ventricular cavity enlargement, normal left  ventricle systolic function and normal diastolic function. EF  approximately 50-55%. - Mild right atrial and mild to moderate left atrial  enlargement. - Aortic valve leaflet sclerosis. No stenosis or regurgitation.  - Mitral valve leaflet sclerosis. Trace mitral valve  regurgitation.  - Mild tricuspid and pulmonic valve regurgitation.  - No pericardial effusion.  - No pulmonary hypertension. TRANSESOPHAGEAL ECHOCARDIOGRAM in June 2015 showed mild-to-moderate left atrial enlargement, borderline reduced left ventricular systolic function. CURRENT ECG     Results for orders placed or performed in visit on 10/12/23   POCT ECG    Narrative    Sinus rhythm, HR 60 bpm, normal axis and intervals, no significant chamber hypertrophy or enlargement, no evidence of prior infarction no ischemia QRS duration is 90 ms QTc is 412 ms. Normal ECG. CARDIOLOGY ASSESSMENT & PLAN     1. Paroxysmal atrial fibrillation (HCC)  POCT ECG      2. Long term current use of anticoagulant therapy        3. Mixed hyperlipidemia        4. Tobacco abuse        5. Essential hypertension        6. Long term current use of antiarrhythmic drug          Paroxysmal atrial fibrillation [facility])  Mr. Smith Nolan overall doing well from cardiac perspective with no recent symptoms of atrial fibrillation. He is on antiarrhythmic therapy with flecainide and his ECG is normal.  His renal function and electrolytes are normal.  Physical examination is overall unremarkable. -- At this time I am advising him to continue current medications. -- We will plan to see him back in 6 to 8 months. -- Dietary and medical compliance are reinforced.   -- He is advised  to report any concerning symptoms such as chest pain, shortness of breath, decline in exercise tolerance or presyncope/syncope. INTERVAL HISTORY & HISTORY OF PRESENT ILLNESS     Fabrizio Rodriguez is here for follow-up regarding his cardiac comorbidities which include:  Paroxysmal atrial fibrillation, hypertension, dyslipidemia and comorbidities. Was last seen by me in January 2023. After that he underwent a nuclear stress test to screen for ischemic heart disease as this was normal.  In June he had reach out to us that he was having increased frequency of palpitations. We increased his flecainide to 125 mg twice daily. He has been taking the 100 mg pill and 50 mg pill cut into half twice daily. He is here for follow-up today. He has had no new diagnosis or hospitalizations or significant illnesses since last visit. He mentions that increasing the dose of flecainide has improved his symptoms and he does not get the palpitations anymore. He reports that his blood pressure was high when he discontinued the amlodipine so he restarted taking it. From a symptom perspective he denies any chest pain or unusual shortness of breath or palpitations or dizziness or orthopnea or PND or pedal edema. He remains active and has not experienced decline in his exercise tolerance. He has had no recent bleeding or excessive bruising. Functional capacity status:  Good   (Excellent- >10 METs; Good: (7-10 METs); Moderate (4-7 METs); Poor (<= 4 METs)    Any chronic stressors:  None   (feeling of poor health, financial problems, and social isolation etc). Tobacco or alcohol dependence:  He does not drink but he smokes about 8-10 cigarettes a day. Current cardiac medications:  Amlodipine 5 mg daily, lisinopril 20 mg daily, atorvastatin 40 mg daily, apixaban 5 mg twice daily, flecainide 125 mg twice daily, metoprolol tartrate 25 mg twice daily.     Blood work 10/6/2023:  Sodium 139 potassium 4.0 chloride 105 bicarb 25 BUN 18 creatinine 0. 68 GFR 98  Normal LFTs  Magnesium 2.2  Total cholesterol 104 HDL 27 triglyceride 71 direct LDL 63    REVIEW OF SYSTEMS   Positive for:  As noted above in HPI  Negative for: All remaining as reviewed below and in HPI. SYSTEM SYMPTOMS REVIEWED:  General--weight change, fever, night sweats  Respiratory--cough, wheezing, shortness of breath, sputum production  Cardiovascular--chest pain, syncope, dyspnea on exertion, edema, decline in exercise tolerance, claudication   Gastrointestinal--persistent vomiting, diarrhea, abdominal distention, blood in stool   Muscular or skeletal--joint pain or swelling   Neurologic--headaches, syncope, abnormal movement  Hematologic--history of easy bruising and bleeding   Endocrine--thyroid enlargement, heat or cold intolerance, polyuria   Psychiatric--anxiety, depression     *-*-*-*-*-*-*-*-*-*-*-*-*-*-*-*-*-*-*-*-*-*-*-*-*-*-*-*-*-*-*-*-*-*-*-*-*-*-*-*-*-*-*-*-*-*-*-*-*-*-*-*-*-*-  VITAL SIGNS     CURRENT VITAL SIGNS:   Vitals:    10/12/23 1021   BP: 105/64   BP Location: Left arm   Patient Position: Sitting   Cuff Size: Large   Pulse: 60   Weight: 99.3 kg (219 lb)       BMI: Body mass index is 28.89 kg/m².     WEIGHTS:   Wt Readings from Last 25 Encounters:   10/12/23 99.3 kg (219 lb)   09/11/23 97 kg (213 lb 12.8 oz)   09/08/23 97.1 kg (214 lb)   06/19/23 98.1 kg (216 lb 3.2 oz)   05/05/23 102 kg (224 lb)   04/07/23 101 kg (223 lb)   03/20/23 101 kg (223 lb)   03/13/23 101 kg (223 lb)   03/06/23 101 kg (223 lb)   01/18/23 99.5 kg (219 lb 6.4 oz)   01/05/23 102 kg (225 lb)   12/30/22 98 kg (216 lb)   10/26/22 98.3 kg (216 lb 12.8 oz)   08/31/22 98.4 kg (217 lb)   11/23/21 102 kg (225 lb)   08/26/21 101 kg (222 lb)   11/19/20 98 kg (216 lb)   11/17/20 98.9 kg (218 lb)   10/15/20 98.9 kg (218 lb)   08/12/20 97.8 kg (215 lb 9.6 oz)   11/08/19 102 kg (225 lb)   07/22/19 99.8 kg (220 lb)   03/19/19 105 kg (230 lb 9.6 oz)   09/26/18 102 kg (225 lb) *-*-*-*-*-*-*-*-*-*-*-*-*-*-*-*-*-*-*-*-*-*-*-*-*-*-*-*-*-*-*-*-*-*-*-*-*-*-*-*-*-*-*-*-*-*-*-*-*-*-*-*-*-*-  PHYSICAL EXAM     General Appearance:    Alert, cooperative, no distress, appears stated age   Head, Eyes, ENT:    No obvious abnormality, moist mucous mebranes. Neck:   Supple, no carotid bruit or JVD   Back:     Symmetric, no curvature. Lungs:     Respirations unlabored. Clear to auscultation bilaterally,    Chest wall:    No tenderness or deformity   Heart:    Regular rate and rhythm, S1 and S2 normal, no murmur, rub  or gallop. Abdomen:     Soft, non-tender, No obvious masses, or organomegaly   Extremities:   Extremities warm, no cyanosis or edema    Skin:   to mild varicosities at venostatic changes in lower extremities. Normal skin color, texture, and turgor. No rashes or lesions     *-*-*-*-*-*-*-*-*-*-*-*-*-*-*-*-*-*-*-*-*-*-*-*-*-*-*-*-*-*-*-*-*-*-*-*-*-*-*-*-*-*-*-*-*-*-*-*-*-*-*-*-*-*-  CURRENT MEDICATIONS LIST     Current Outpatient Medications:     acyclovir (ZOVIRAX) 800 mg tablet, Take 1 tablet (800 mg total) by mouth 2 (two) times a day (Patient taking differently: Take 800 mg by mouth if needed), Disp: 120 tablet, Rfl: 0    amLODIPine (NORVASC) 5 mg tablet, TAKE 1 TABLET TWICE A DAY, Disp: 180 tablet, Rfl: 3    apixaban (Eliquis) 5 mg, Take 1 tablet (5 mg total) by mouth 2 (two) times a day, Disp: 180 tablet, Rfl: 4    atorvastatin (LIPITOR) 40 mg tablet, TAKE 1 TABLET DAILY, Disp: 90 tablet, Rfl: 3    Cholecalciferol (VITAMIN D3) 1000 units CAPS, Take 5,000 Units by mouth 3 (three) times a week, Disp: , Rfl:     Coenzyme Q10 (CO Q-10) 200 MG CAPS, Patient states that he takes this on a daily basis.  , Disp: , Rfl:     Cyanocobalamin ER 1000 MCG TBCR, 1 daily, Disp: , Rfl:     flecainide (TAMBOCOR) 100 mg tablet, Take 100 mg by mouth every 12 (twelve) hours, Disp: , Rfl:     flecainide (TAMBOCOR) 50 mg tablet, Take 0.5 tablets (25 mg total) by mouth 2 (two) times a day Take half pill twice daily in addition to 100 mg twice daily pill pill twice daily. , Disp: 180 tablet, Rfl: 1    fluticasone (FLONASE) 50 mcg/act nasal spray, 2 sprays into each nostril daily, Disp: 9.9 mL, Rfl: 5    lisinopril (ZESTRIL) 20 mg tablet, TAKE 1 TABLET TWICE A DAY, Disp: 180 tablet, Rfl: 3    LORazepam (ATIVAN) 1 mg tablet, Take 1 tablet (1 mg total) by mouth 2 (two) times a day As needed for anxiety, Disp: 180 tablet, Rfl: 1    metoprolol tartrate (LOPRESSOR) 25 mg tablet, TAKE 1 TABLET TWICE A DAY, Disp: 180 tablet, Rfl: 3    Multiple Vitamins-Minerals (MULTIVITAMIN ADULT PO), take 1 by Oral route once, Disp: , Rfl:     selenium 200 mcg, take one tablet daily, Disp: , Rfl:     tamsulosin (FLOMAX) 0.4 mg, Take 1 capsule (0.4 mg total) by mouth daily with dinner, Disp: 30 capsule, Rfl: 5    ZINC PICOLINATE PO, Take 22 mg by mouth daily, Disp: , Rfl:     neomycin-polymyxin-hydrocortisone (CORTISPORIN) 0.35%-10,000 units/mL-1% otic suspension, Administer 3 drops to the right ear 4 (four) times a day for 5 days, Disp: 3 mL, Rfl: 0       ALLERGIES     Allergies   Allergen Reactions    No Active Allergies        *-*-*-*-*-*-*-*-*-*-*-*-*-*-*-*-*-*-*-*-*-*-*-*-*-*-*-*-*-*-*-*-*-*-*-*-*-*-*-*-*-*-*-*-*-*-*-*-*-*-*-*-*-*-  LABORATORY DATA   No results found for: "NA"  Potassium   Date Value Ref Range Status   10/06/2023 4.0 3.5 - 5.3 mmol/L Final   09/06/2023 4.4 3.5 - 5.3 mmol/L Final   08/30/2023 4.3 3.5 - 5.3 mmol/L Final     Chloride   Date Value Ref Range Status   10/06/2023 105 96 - 108 mmol/L Final   09/06/2023 105 96 - 108 mmol/L Final   08/30/2023 104 96 - 108 mmol/L Final     CO2   Date Value Ref Range Status   10/06/2023 25 21 - 32 mmol/L Final   09/06/2023 27 21 - 32 mmol/L Final   08/30/2023 27 21 - 32 mmol/L Final     BUN   Date Value Ref Range Status   10/06/2023 18 5 - 25 mg/dL Final   09/06/2023 14 5 - 25 mg/dL Final   08/30/2023 19 5 - 25 mg/dL Final     Creatinine   Date Value Ref Range Status   10/06/2023 0. 68 0.60 - 1.30 mg/dL Final     Comment:     Standardized to IDMS reference method   09/06/2023 0.69 0.60 - 1.30 mg/dL Final     Comment:     Standardized to IDMS reference method   08/30/2023 0.77 0.60 - 1.30 mg/dL Final     Comment:     Standardized to IDMS reference method     eGFR   Date Value Ref Range Status   10/06/2023 98 ml/min/1.73sq m Final   09/06/2023 98 ml/min/1.73sq m Final   08/30/2023 93 ml/min/1.73sq m Final     Calcium   Date Value Ref Range Status   10/06/2023 9.1 8.4 - 10.2 mg/dL Final   09/06/2023 9.1 8.4 - 10.2 mg/dL Final   08/30/2023 9.5 8.4 - 10.2 mg/dL Final     AST   Date Value Ref Range Status   10/06/2023 11 (L) 13 - 39 U/L Final   09/06/2023 11 (L) 13 - 39 U/L Final   08/30/2023 11 (L) 13 - 39 U/L Final     ALT   Date Value Ref Range Status   10/06/2023 14 7 - 52 U/L Final     Comment:     Specimen collection should occur prior to Sulfasalazine administration due to the potential for falsely depressed results. 09/06/2023 11 7 - 52 U/L Final     Comment:     Specimen collection should occur prior to Sulfasalazine administration due to the potential for falsely depressed results. 08/30/2023 13 7 - 52 U/L Final     Comment:     Specimen collection should occur prior to Sulfasalazine administration due to the potential for falsely depressed results.       Alkaline Phosphatase   Date Value Ref Range Status   10/06/2023 62 34 - 104 U/L Final   09/06/2023 58 34 - 104 U/L Final   08/30/2023 60 34 - 104 U/L Final     Magnesium   Date Value Ref Range Status   10/06/2023 2.2 1.9 - 2.7 mg/dL Final   08/29/2022 2.4 1.6 - 2.6 mg/dL Final   02/28/2022 2.3 1.6 - 2.6 mg/dL Final     WBC   Date Value Ref Range Status   10/06/2023 9.88 4.31 - 10.16 Thousand/uL Final   09/06/2023 8.43 4.31 - 10.16 Thousand/uL Final   08/30/2023 9.48 4.31 - 10.16 Thousand/uL Final     Hemoglobin   Date Value Ref Range Status   10/06/2023 15.0 12.0 - 17.0 g/dL Final   09/06/2023 14.9 12.0 - 17.0 g/dL Final 2023 15.2 12.0 - 17.0 g/dL Final     Platelets   Date Value Ref Range Status   10/06/2023 337 149 - 390 Thousands/uL Final   2023 301 149 - 390 Thousands/uL Final   2023 320 149 - 390 Thousands/uL Final     No results found for: "PT", "PTT", "INR"  No results found for: "CKMB", "DIGOXIN"  No results found for: "TSH"  No results found for: "CHOL"   Hemoglobin A1C   Date Value Ref Range Status   2023 6.0 (H) Normal 4.0-5.6%; PreDiabetic 5.7-6.4%; Diabetic >=6.5%; Glycemic control for adults with diabetes <7.0% % Final     Urine Culture   Date Value Ref Range Status   2021 No Growth <1000 cfu/mL  Final       *-*-*-*-*-*-*-*-*-*-*-*-*-*-*-*-*-*-*-*-*-*-*-*-*-*-*-*-*-*-*-*-*-*-*-*-*-*-*-*-*-*-*-*-*-*-*-*-*-*-*-*-*-*-  PREVIOUS CARDIOLOGY & RADIOLOGY TEST RESULTS   I have personally reviewed pertinent results of cardiovascular tests noted below.     Results for orders placed in visit on 18    Echo complete with contrast if indicated    Narrative  [address] [facility],Third Floor  [address] [facility]. [address] [place] [address], [phone] [phone]    Cardiovascular Report  _________________________________________________________________        Transthoracic Echocardiogram Report  Kraig Stain    MRN:                 [id number]  Account :           [de-identified]  Accession :         [id number]  Exam Date:           2018 10:29  Patient Location:    O  Ordering Phys:       Clive Lazcano M.D.   [facility]  Attending Phys:      Clive Lazcano M.D.   ([facility])  Technologist:        Haven Flores    Age:  58             :   1955           Gender:   M  Wt:   236 lb         Ht:    73 in  Indication:  Atrial fibrillation,   Hypertension  BP:         /       HR:    Rhythm:              sinus  Technical Quality:   good      MEASUREMENTS  2D ECHO  Body Surface Area                 2.4 m²  LV Diastolic Diameter PLAX        6.2 cm  LV Systolic Diameter PLAX         4.2 cm  IVS Diastolic Thickness           1.2 cm  LVPW Diastolic Thickness          1.1 cm  LV Relative Wall Thickness        0.4  RV Internal Dim ED PLAX           4.4 cm  Aortic Root Diameter              3.2 cm  LA Systolic Diameter LX           4.8 cm  LA Area                           27.0 cm²  LV Ejection Fraction MOD 4C       60.4 %  RA Area 4C View                   25.0 cm²    DOPPLER  MV Area PHT                       3.5 cm²  Mitral E Point Velocity           90.0 cm/s  Mitral A Point Velocity           60.6 cm/s  Mitral E to A Ratio               1.5  MV E' Velocity                    7.3 cm/s  Mitral E to MV E' Ratio           12.4  TR Peak Velocity                  272.8 cm/s  TR Peak Gradient                  29.8 mmHg  TAPSE                             2.4 cm      FINDINGS  Left Ventricle  Mildly enlarged left ventricle cavity, mild concentric left  ventricular hypertrophy, low-normal left ventricular systolic  function and wall motion. Ejection fraction is estimated at  around 50-55%. Normal diastolic function. Right Ventricle  Normal right ventricle size and systolic function. Right Atrium  Mild right atrial cavity enlargement. Left Atrium  Mild approaching moderate left atrial cavity enlargement. Intact  interatrial septum. Mitral Valve  Mild anterior mitral valve leaflet sclerosis. Trace mitral valve  regurgitation. Aortic Valve  Trileaflet aortic valve with mild sclerosis. No aortic valve  stenosis or regurgitation. Tricuspid Valve  Mild tricuspid valve regurgitation. Pulmonic Valve  Trace to mild pulmonic valve regurgitation. Pericardium  No pericardial effusion. Aorta  Aortic root and proximal ascending aorta are normal in size on  2-D imaging. Additional Findings  Inferior vena cava is normal in size and demonstrates over 50%  collapse with respirations. CONCLUSIONS  - Mild left ventricular cavity enlargement, normal left  ventricle systolic function and normal diastolic function. EF  approximately 50-55%. - Mild right atrial and mild to moderate left atrial  enlargement. - Aortic valve leaflet sclerosis. No stenosis or regurgitation.  - Mitral valve leaflet sclerosis. Trace mitral valve  regurgitation.  - Mild tricuspid and pulmonic valve regurgitation.  - No pericardial effusion.  - No pulmonary hypertension. Compared to previous echocardiogram from June 2015 there is  overall no significant change. Carlita Dietz  (Electronically Signed)  Final Date:      19 June 2018 18:16  CV Report                    CECILLE OWENS       [id number]  Final                                                     Page 3    No results found for this or any previous visit. No results found for this or any previous visit. No results found for this or any previous visit. Complete PFT without post bronchodilator  Daphene Civil 76 y.o. male MRN: [id number]    The patient underwent spirometry, lung volumes, diffusion capacity and   airway resistance studies. The patient gave a fair effort    Study Interpretation:     Normal lung volumes. Restrictive pattern on the spirometry. Reduced diffusion capacity. Normal airway resistance as indicated by the specific airway conductance.         *-*-*-*-*-*-*-*-*-*-*-*-*-*-*-*-*-*-*-*-*-*-*-*-*-*-*-*-*-*-*-*-*-*-*-*-*-*-*-*-*-*-*-*-*-*-*-*-*-*-*-*-*-*-  SIGNATURES:   @AYD@   Irma Manning MD; [facility]    *-*-*-*-*-*-*-*-*-*-*-*-*-*-*-*-*-*-*-*-*-*-*-*-*-*-*-*-*-*-*-*-*-*-*-*-*-*-*-*-*-*-*-*-*-*-*-*-*-*-*-*-*-*-  PAST MEDICAL HISTORY:  Past Medical History:   Diagnosis Date    Atrial fibrillation ([address]) 06/26/2015    Colon polyp     HLD (hyperlipidemia)     HTN (hypertension)     Hypertension     Multiple thyroid nodules     Positive FIT (fecal immunochemical test)     Pre-diabetes     Vitamin D deficiency     PAST SURGICAL HISTORY:  Past Surgical History:   Procedure Laterality Date    CARDIOVERSION  06/28/2015    COLONOSCOPY      US GUIDED THYROID BIOPSY  10/16/2020 FAMILY HISTORY:  Family History   Problem Relation Age of Onset    Heart attack Father 61        MI    Heart attack Paternal Uncle         MI    SOCIAL HISTORY:  Social History     Tobacco Use   Smoking Status Every Day    Packs/day: 0.50    Types: Cigarettes    Start date: 1968   Smokeless Tobacco Never   Tobacco Comments    Patient smokes half pack a day now, however he used to smoke a pack a day earlier      Social History     Substance and Sexual Activity   Alcohol Use Not Currently     Social History     Substance and Sexual Activity   Drug Use Never    [unfilled]     *-*-*-*-*-*-*-*-*-*-*-*-*-*-*-*-*-*-*-*-*-*-*-*-*-*-*-*-*-*-*-*-*-*-*-*-*-*-*-*-*-*-*-*-*-*-*-*-*-*-*-*-*-*  ALLERGIES:  Allergies   Allergen Reactions    No Active Allergies     CURRENT SCHEDULED MEDICATIONS:    Current Outpatient Medications:     acyclovir (ZOVIRAX) 800 mg tablet, Take 1 tablet (800 mg total) by mouth 2 (two) times a day (Patient taking differently: Take 800 mg by mouth if needed), Disp: 120 tablet, Rfl: 0    amLODIPine (NORVASC) 5 mg tablet, TAKE 1 TABLET TWICE A DAY, Disp: 180 tablet, Rfl: 3    apixaban (Eliquis) 5 mg, Take 1 tablet (5 mg total) by mouth 2 (two) times a day, Disp: 180 tablet, Rfl: 4    atorvastatin (LIPITOR) 40 mg tablet, TAKE 1 TABLET DAILY, Disp: 90 tablet, Rfl: 3    Cholecalciferol (VITAMIN D3) 1000 units CAPS, Take 5,000 Units by mouth 3 (three) times a week, Disp: , Rfl:     Coenzyme Q10 (CO Q-10) 200 MG CAPS, Patient states that he takes this on a daily basis. , Disp: , Rfl:     Cyanocobalamin ER 1000 MCG TBCR, 1 daily, Disp: , Rfl:     flecainide (TAMBOCOR) 100 mg tablet, Take 100 mg by mouth every 12 (twelve) hours, Disp: , Rfl:     flecainide (TAMBOCOR) 50 mg tablet, Take 0.5 tablets (25 mg total) by mouth 2 (two) times a day Take half pill twice daily in addition to 100 mg twice daily pill pill twice daily. , Disp: 180 tablet, Rfl: 1    fluticasone (FLONASE) 50 mcg/act nasal spray, 2 sprays into each nostril daily, Disp: 9.9 mL, Rfl: 5    lisinopril (ZESTRIL) 20 mg tablet, TAKE 1 TABLET TWICE A DAY, Disp: 180 tablet, Rfl: 3    LORazepam (ATIVAN) 1 mg tablet, Take 1 tablet (1 mg total) by mouth 2 (two) times a day As needed for anxiety, Disp: 180 tablet, Rfl: 1    metoprolol tartrate (LOPRESSOR) 25 mg tablet, TAKE 1 TABLET TWICE A DAY, Disp: 180 tablet, Rfl: 3    Multiple Vitamins-Minerals (MULTIVITAMIN ADULT PO), take 1 by Oral route once, Disp: , Rfl:     selenium 200 mcg, take one tablet daily, Disp: , Rfl:     tamsulosin (FLOMAX) 0.4 mg, Take 1 capsule (0.4 mg total) by mouth daily with dinner, Disp: 30 capsule, Rfl: 5    ZINC PICOLINATE PO, Take 22 mg by mouth daily, Disp: , Rfl:     neomycin-polymyxin-hydrocortisone (CORTISPORIN) 0.35%-10,000 units/mL-1% otic suspension, Administer 3 drops to the right ear 4 (four) times a day for 5 days, Disp: 3 mL, Rfl: 0     *-*-*-*-*-*-*-*-*-*-*-*-*-*-*-*-*-*-*-*-*-*-*-*-*-*-*-*-*-*-*-*-*-*-*-*-*-*-*-*-*-*-*-*-*-*-*-*-*-*-*-*-*-*

## 2023-10-12 NOTE — ASSESSMENT & PLAN NOTE
Mr. Emery Rear overall doing well from cardiac perspective with no recent symptoms of atrial fibrillation. He is on antiarrhythmic therapy with flecainide and his ECG is normal.  His renal function and electrolytes are normal.  Physical examination is overall unremarkable. -- At this time I am advising him to continue current medications. -- We will plan to see him back in 6 to 8 months. -- Dietary and medical compliance are reinforced. -- He is advised  to report any concerning symptoms such as chest pain, shortness of breath, decline in exercise tolerance or presyncope/syncope.

## 2023-10-30 DIAGNOSIS — R35.1 BPH ASSOCIATED WITH NOCTURIA: ICD-10-CM

## 2023-10-30 DIAGNOSIS — N40.1 BPH ASSOCIATED WITH NOCTURIA: ICD-10-CM

## 2023-10-30 NOTE — TELEPHONE ENCOUNTER
Patient needs a 90 day supply of his Tamsulosin 0.4 mg called into [facility] in [place]. I did tell him he had refills but it was for a 30 day supply he needs it to be for a 90 day supply.

## 2023-10-31 RX ORDER — TAMSULOSIN HYDROCHLORIDE 0.4 MG/1
0.4 CAPSULE ORAL
Qty: 90 CAPSULE | Refills: 3 | Status: SHIPPED | OUTPATIENT
Start: 2023-10-31

## 2023-11-10 PROBLEM — Z00.00 HEALTHCARE MAINTENANCE: Status: RESOLVED | Noted: 2022-08-31 | Resolved: 2023-11-10

## 2023-11-10 PROBLEM — Z12.11 SCREENING FOR COLON CANCER: Status: RESOLVED | Noted: 2022-08-31 | Resolved: 2023-11-10

## 2023-12-04 ENCOUNTER — HOSPITAL ENCOUNTER (OUTPATIENT)
Dept: CT IMAGING | Facility: HOSPITAL | Age: 68
Discharge: HOME | End: 2023-12-04
Payer: MEDICARE

## 2023-12-04 DIAGNOSIS — Z72.0 TOBACCO ABUSE: ICD-10-CM

## 2023-12-04 DIAGNOSIS — Z87.891 PERSONAL HISTORY OF NICOTINE DEPENDENCE: ICD-10-CM

## 2023-12-04 PROCEDURE — 71271 CT THORAX LUNG CANCER SCR C-: CPT

## 2023-12-08 ENCOUNTER — PATIENT MESSAGE (OUTPATIENT)
Dept: FAMILY MEDICINE CLINIC | Facility: CLINIC | Age: 68
End: 2023-12-08

## 2023-12-08 DIAGNOSIS — E27.8 ADRENAL NODULE (HCC): Primary | ICD-10-CM

## 2023-12-11 ENCOUNTER — TELEPHONE (OUTPATIENT)
Dept: FAMILY MEDICINE CLINIC | Facility: CLINIC | Age: 68
End: 2023-12-11

## 2023-12-11 NOTE — TELEPHONE ENCOUNTER
Dr. Hilliard Parent ordered a CT Abdomen wants to know if he should wait until he sees Dr. Ana Hess before going for this test. Please call him do not send him a message.  [phone]

## 2023-12-11 NOTE — TELEPHONE ENCOUNTER
No, I recommend he complete the CT scan of the adrenal because his adrenal nodule is enlarging and I agree he should see Dr. Edwardo Edward, his endocrinologist

## 2023-12-13 ENCOUNTER — PATIENT OUTREACH (OUTPATIENT)
Dept: OTHER | Facility: CLINIC | Age: 68
End: 2023-12-13

## 2023-12-28 ENCOUNTER — APPOINTMENT (OUTPATIENT)
Dept: LAB | Facility: CLINIC | Age: 68
End: 2023-12-28
Payer: MEDICARE

## 2023-12-28 DIAGNOSIS — R79.89 ELEVATED PLASMA METANEPHRINES: ICD-10-CM

## 2023-12-28 DIAGNOSIS — R79.89 HYPOURICEMIA: ICD-10-CM

## 2023-12-28 PROCEDURE — 83835 ASSAY OF METANEPHRINES: CPT

## 2024-01-01 DIAGNOSIS — I48.0 PAROXYSMAL ATRIAL FIBRILLATION (HCC): Primary | ICD-10-CM

## 2024-01-04 LAB
METANEPH 24H UR-MRATE: 230 UG/24 HR (ref 58–276)
METANEPHS 24H UR-MCNC: 96 UG/L
NORMETANEPHRINE 24H UR-MCNC: 288 UG/L
NORMETANEPHRINE 24H UR-MRATE: 691 UG/24 HR (ref 156–729)

## 2024-01-06 RX ORDER — FLECAINIDE ACETATE 100 MG/1
100 TABLET ORAL EVERY 12 HOURS
Qty: 180 TABLET | Refills: 3 | Status: SHIPPED | OUTPATIENT
Start: 2024-01-06

## 2024-01-22 ENCOUNTER — TELEPHONE (OUTPATIENT)
Dept: VASCULAR SURGERY | Facility: CLINIC | Age: 69
End: 2024-01-22

## 2024-01-22 DIAGNOSIS — R35.1 BPH ASSOCIATED WITH NOCTURIA: ICD-10-CM

## 2024-01-22 DIAGNOSIS — N40.1 BPH ASSOCIATED WITH NOCTURIA: ICD-10-CM

## 2024-01-22 NOTE — TELEPHONE ENCOUNTER
Medication: Tamsulosin    Dose/Frequency: 0.4mg once daily    Quantity: 100    Pharmacy: [facility]- [place]    Office:   [x] PCP/Provider -   [] Speciality/Provider -     Does the patient have enough for 3 days?   [x] Yes   [] No - Send as HP to POD

## 2024-01-23 ENCOUNTER — TELEPHONE (OUTPATIENT)
Age: 69
End: 2024-01-23

## 2024-01-23 DIAGNOSIS — E78.2 MIXED HYPERLIPIDEMIA: ICD-10-CM

## 2024-01-23 DIAGNOSIS — R35.1 BPH ASSOCIATED WITH NOCTURIA: ICD-10-CM

## 2024-01-23 DIAGNOSIS — I48.0 PAROXYSMAL ATRIAL FIBRILLATION (HCC): ICD-10-CM

## 2024-01-23 DIAGNOSIS — I10 ESSENTIAL HYPERTENSION, BENIGN: ICD-10-CM

## 2024-01-23 DIAGNOSIS — N40.1 BPH ASSOCIATED WITH NOCTURIA: ICD-10-CM

## 2024-01-23 PROCEDURE — 4010F ACE/ARB THERAPY RXD/TAKEN: CPT | Performed by: NURSE PRACTITIONER

## 2024-01-23 RX ORDER — AMLODIPINE BESYLATE 5 MG/1
5 TABLET ORAL 2 TIMES DAILY
Qty: 200 TABLET | Refills: 2 | Status: SHIPPED | OUTPATIENT
Start: 2024-01-23

## 2024-01-23 RX ORDER — LISINOPRIL 20 MG/1
TABLET ORAL
Qty: 200 TABLET | Refills: 2 | Status: SHIPPED | OUTPATIENT
Start: 2024-01-23

## 2024-01-23 RX ORDER — TAMSULOSIN HYDROCHLORIDE 0.4 MG/1
0.4 CAPSULE ORAL
Qty: 100 CAPSULE | Refills: 1 | Status: SHIPPED | OUTPATIENT
Start: 2024-01-23

## 2024-01-23 RX ORDER — TAMSULOSIN HYDROCHLORIDE 0.4 MG/1
0.4 CAPSULE ORAL
Qty: 90 CAPSULE | Refills: 1 | Status: SHIPPED | OUTPATIENT
Start: 2024-01-23 | End: 2024-01-23 | Stop reason: SDUPTHER

## 2024-01-23 RX ORDER — ATORVASTATIN CALCIUM 40 MG/1
40 TABLET, FILM COATED ORAL DAILY
Qty: 100 TABLET | Refills: 2 | Status: SHIPPED | OUTPATIENT
Start: 2024-01-23

## 2024-01-23 NOTE — TELEPHONE ENCOUNTER
Pharmacy called and requested a 100 day refill due to patients insurance. Sent new script to pharmacy.

## 2024-01-23 NOTE — TELEPHONE ENCOUNTER
Patient states his insurance plan will cover 100 capsules of Tamsulosin. Asking for the dr to change the script from 90 capsules to 100 and re send.

## 2024-01-30 ENCOUNTER — OFFICE VISIT (OUTPATIENT)
Dept: FAMILY MEDICINE CLINIC | Facility: CLINIC | Age: 69
End: 2024-01-30
Payer: COMMERCIAL

## 2024-01-30 VITALS
OXYGEN SATURATION: 98 % | SYSTOLIC BLOOD PRESSURE: 128 MMHG | HEIGHT: 73 IN | WEIGHT: 228 LBS | BODY MASS INDEX: 30.22 KG/M2 | HEART RATE: 74 BPM | DIASTOLIC BLOOD PRESSURE: 78 MMHG

## 2024-01-30 DIAGNOSIS — E27.8 ADRENAL NODULE (HCC): ICD-10-CM

## 2024-01-30 DIAGNOSIS — J43.2 CENTRILOBULAR EMPHYSEMA (HCC): ICD-10-CM

## 2024-01-30 DIAGNOSIS — S16.1XXA STRAIN OF NECK MUSCLE, INITIAL ENCOUNTER: Primary | ICD-10-CM

## 2024-01-30 DIAGNOSIS — I48.0 PAROXYSMAL ATRIAL FIBRILLATION (HCC): ICD-10-CM

## 2024-01-30 PROCEDURE — 3074F SYST BP LT 130 MM HG: CPT | Performed by: NURSE PRACTITIONER

## 2024-01-30 PROCEDURE — 3288F FALL RISK ASSESSMENT DOCD: CPT | Performed by: NURSE PRACTITIONER

## 2024-01-30 PROCEDURE — 1160F RVW MEDS BY RX/DR IN RCRD: CPT | Performed by: NURSE PRACTITIONER

## 2024-01-30 PROCEDURE — 99214 OFFICE O/P EST MOD 30 MIN: CPT | Performed by: NURSE PRACTITIONER

## 2024-01-30 PROCEDURE — 3078F DIAST BP <80 MM HG: CPT | Performed by: NURSE PRACTITIONER

## 2024-01-30 PROCEDURE — 1101F PT FALLS ASSESS-DOCD LE1/YR: CPT | Performed by: NURSE PRACTITIONER

## 2024-01-30 PROCEDURE — 1159F MED LIST DOCD IN RCRD: CPT | Performed by: NURSE PRACTITIONER

## 2024-01-30 RX ORDER — METHYLPREDNISOLONE 4 MG/1
TABLET ORAL
Qty: 21 EACH | Refills: 0 | Status: SHIPPED | OUTPATIENT
Start: 2024-01-30

## 2024-01-30 RX ORDER — METHOCARBAMOL 500 MG/1
500 TABLET, FILM COATED ORAL 3 TIMES DAILY PRN
Qty: 30 TABLET | Refills: 0 | Status: SHIPPED | OUTPATIENT
Start: 2024-01-30

## 2024-01-30 NOTE — ASSESSMENT & PLAN NOTE
Patient's symptoms are consistent with cervical muscle strain.  Medrol Dosepak was ordered to help with acute inflammation.  Robaxin 500 mg was ordered to be used as needed up to 3 times per day for muscle spasms.  Patient was also advised that Tylenol can be used as needed for pain and that he should also ice the area as needed 20 minutes on 20 minutes off.

## 2024-01-30 NOTE — PROGRESS NOTES
Name: Sushil Harrison      : 1955      MRN: [id number]  Encounter Provider: ELIZABET Mata  Encounter Date: 2024   Encounter department: [facility]    Assessment & Plan     1. Strain of neck muscle, initial encounter  Assessment & Plan:  Patient's symptoms are consistent with cervical muscle strain.  Medrol Dosepak was ordered to help with acute inflammation.  Robaxin 500 mg was ordered to be used as needed up to 3 times per day for muscle spasms.  Patient was also advised that Tylenol can be used as needed for pain and that he should also ice the area as needed 20 minutes on 20 minutes off.    Orders:  -     methylPREDNISolone 4 MG tablet therapy pack; Use as directed on package  -     methocarbamol (ROBAXIN) 500 mg tablet; Take 1 tablet (500 mg total) by mouth 3 (three) times a day as needed for muscle spasms    2. Centrilobular emphysema (HCC)  Assessment & Plan:  Patient's symptoms are currently stable.      3. Paroxysmal atrial fibrillation (HCC)  Assessment & Plan:  Patient continues to follow with cardiology for this.      4. Adrenal nodule (HCC)  Assessment & Plan:  Patient seems to follow with endocrinology for this.        Depression Screening and Follow-up Plan: Patient was screened for depression during today's encounter. They screened negative with a PHQ-2 score of 0.        Subjective      Left sided neck pain: Patient reports over the past few days she has been experiencing recurrent left-sided neck pain.  He denies any radiation of the pain.  He also denies any recent falls or injuries to the affected area.  He does report that the pain worsens when looking down or turning his head to the left.  He does also report muscle spasms in the affected area.    Centrilobular emphysema: Patient denies any current respiratory symptoms and is not currently using any inhalers.    Paroxysmal atrial fibrillation: Patient does have regular follow-up with cardiology regarding  this and is currently managed on Lopressor for rate control and Eliquis for anticoagulation.  Patient currently denies any new cardiac symptoms.    Adrenal nodule: This has remained stable.  Patient continues to follow with endocrinology for this.      Review of Systems   Constitutional:  Negative for chills and fever.   HENT:  Negative for ear pain and sore throat.    Eyes:  Negative for pain and visual disturbance.   Respiratory:  Negative for cough, chest tightness, shortness of breath and wheezing.    Cardiovascular:  Negative for chest pain, palpitations and leg swelling.   Gastrointestinal:  Negative for abdominal pain, constipation, diarrhea, nausea and vomiting.   Endocrine: Negative for cold intolerance and heat intolerance.   Genitourinary:  Negative for decreased urine volume, dysuria and hematuria.   Musculoskeletal:  Positive for neck pain (left sided). Negative for arthralgias and back pain.   Skin:  Negative for color change and rash.   Allergic/Immunologic: Negative for environmental allergies.   Neurological:  Negative for dizziness, seizures, syncope, weakness, light-headedness, numbness and headaches.   Hematological:  Negative for adenopathy.   Psychiatric/Behavioral:  Negative for confusion. The patient is not nervous/anxious.    All other systems reviewed and are negative.      Current Outpatient Medications on File Prior to Visit   Medication Sig   • acyclovir (ZOVIRAX) 800 mg tablet Take 1 tablet (800 mg total) by mouth 2 (two) times a day (Patient taking differently: Take 800 mg by mouth if needed)   • amLODIPine (NORVASC) 5 mg tablet Take 1 tablet (5 mg total) by mouth 2 (two) times a day   • apixaban (Eliquis) 5 mg Take 1 tablet (5 mg total) by mouth 2 (two) times a day   • atorvastatin (LIPITOR) 40 mg tablet Take 1 tablet (40 mg total) by mouth daily   • Cholecalciferol (VITAMIN D3) 1000 units CAPS Take 5,000 Units by mouth 3 (three) times a week   • Coenzyme Q10 (CO Q-10) 200 MG CAPS  "Patient states that he takes this on a daily basis.    • Cyanocobalamin ER 1000 MCG TBCR 1 daily   • flecainide (TAMBOCOR) 100 mg tablet take 1 tablet by mouth every 12 hours   • flecainide (TAMBOCOR) 50 mg tablet Take 0.5 tablets (25 mg total) by mouth 2 (two) times a day Take half pill twice daily in addition to 100 mg twice daily pill pill twice daily.   • fluticasone (FLONASE) 50 mcg/act nasal spray 2 sprays into each nostril daily   • lisinopril (ZESTRIL) 20 mg tablet One tablet twice a day   • LORazepam (ATIVAN) 1 mg tablet Take 1 tablet (1 mg total) by mouth 2 (two) times a day As needed for anxiety   • metoprolol tartrate (LOPRESSOR) 25 mg tablet Take 1 tablet (25 mg total) by mouth 2 (two) times a day   • Multiple Vitamins-Minerals (MULTIVITAMIN ADULT PO) take 1 by Oral route once   • selenium 200 mcg take one tablet daily   • tamsulosin (FLOMAX) 0.4 mg Take 1 capsule (0.4 mg total) by mouth daily with dinner   • ZINC PICOLINATE PO Take 22 mg by mouth daily   • neomycin-polymyxin-hydrocortisone (CORTISPORIN) 0.35%-10,000 units/mL-1% otic suspension Administer 3 drops to the right ear 4 (four) times a day for 5 days   • [DISCONTINUED] methylPREDNISolone 4 MG tablet therapy pack Use as directed on package (Patient not taking: Reported on 1/30/2024)       Objective     /78 (BP Location: Right arm, Patient Position: Sitting, Cuff Size: Standard)   Pulse 74   Ht 6' 1\" (1.854 m)   Wt 103 kg (228 lb)   SpO2 98%   BMI 30.08 kg/m²     Physical Exam  Vitals and nursing note reviewed.   Constitutional:       General: He is not in acute distress.     Appearance: Normal appearance. He is not ill-appearing.   HENT:      Head: Normocephalic.   Eyes:      Conjunctiva/sclera: Conjunctivae normal.   Neck:      Comments: Pain was noted with palpation of the left cervical paraspinals.  No pain was noted in the midline cervical spine or left trapezius area.  Patient did have a full range of motion of his neck but did " report pain when looking down or turning his head to the left.  Cardiovascular:      Rate and Rhythm: Normal rate and regular rhythm.      Pulses: Normal pulses.           Carotid pulses are 2+ on the right side and 2+ on the left side.       Radial pulses are 2+ on the right side and 2+ on the left side.        Posterior tibial pulses are 2+ on the right side and 2+ on the left side.      Heart sounds: Normal heart sounds. No murmur heard.  Pulmonary:      Effort: Pulmonary effort is normal. No respiratory distress.      Breath sounds: Normal breath sounds. No decreased breath sounds, wheezing, rhonchi or rales.   Abdominal:      General: Abdomen is flat. Bowel sounds are normal. There is no distension.      Palpations: Abdomen is soft.      Tenderness: There is no abdominal tenderness. There is no guarding.   Musculoskeletal:         General: Normal range of motion.      Cervical back: Normal range of motion. Spasms and tenderness present. No swelling, edema, erythema, rigidity, bony tenderness or crepitus. Pain with movement and muscular tenderness present. No spinous process tenderness. Normal range of motion.      Right lower leg: No edema.      Left lower leg: No edema.   Skin:     General: Skin is warm and dry.      Capillary Refill: Capillary refill takes less than 2 seconds.   Neurological:      General: No focal deficit present.      Mental Status: He is alert.   Psychiatric:         Mood and Affect: Mood normal.         Behavior: Behavior normal.         Thought Content: Thought content normal.         Judgment: Judgment normal.       ELIZABET Mata

## 2024-03-05 ENCOUNTER — APPOINTMENT (OUTPATIENT)
Dept: LAB | Facility: CLINIC | Age: 69
End: 2024-03-05
Payer: COMMERCIAL

## 2024-03-05 DIAGNOSIS — E27.8 ADRENAL NODULE (HCC): ICD-10-CM

## 2024-03-05 DIAGNOSIS — R73.9 HYPERGLYCEMIA: ICD-10-CM

## 2024-03-05 DIAGNOSIS — E66.3 OVERWEIGHT (BMI 25.0-29.9): ICD-10-CM

## 2024-03-05 DIAGNOSIS — R79.89 ELEVATED BRAIN NATRIURETIC PEPTIDE (BNP) LEVEL: ICD-10-CM

## 2024-03-05 DIAGNOSIS — I48.0 PAROXYSMAL ATRIAL FIBRILLATION (HCC): ICD-10-CM

## 2024-03-05 DIAGNOSIS — E78.2 MIXED HYPERLIPIDEMIA: ICD-10-CM

## 2024-03-05 DIAGNOSIS — J43.2 CENTRILOBULAR EMPHYSEMA (HCC): ICD-10-CM

## 2024-03-05 DIAGNOSIS — Z87.891 PERSONAL HISTORY OF NICOTINE DEPENDENCE: ICD-10-CM

## 2024-03-05 DIAGNOSIS — Z79.01 LONG TERM CURRENT USE OF ANTICOAGULANT THERAPY: ICD-10-CM

## 2024-03-05 DIAGNOSIS — I10 ESSENTIAL HYPERTENSION: ICD-10-CM

## 2024-03-05 DIAGNOSIS — E04.2 MULTIPLE THYROID NODULES: ICD-10-CM

## 2024-03-05 DIAGNOSIS — F41.9 ANXIETY: ICD-10-CM

## 2024-03-05 DIAGNOSIS — Z00.00 HEALTHCARE MAINTENANCE: ICD-10-CM

## 2024-03-05 DIAGNOSIS — D72.829 LEUKOCYTOSIS, UNSPECIFIED TYPE: ICD-10-CM

## 2024-03-05 DIAGNOSIS — Z72.0 TOBACCO ABUSE: ICD-10-CM

## 2024-03-05 LAB
ALBUMIN SERPL BCP-MCNC: 4.1 G/DL (ref 3.5–5)
ALP SERPL-CCNC: 61 U/L (ref 34–104)
ALT SERPL W P-5'-P-CCNC: 15 U/L (ref 7–52)
AMORPH URATE CRY URNS QL MICRO: ABNORMAL
ANION GAP SERPL CALCULATED.3IONS-SCNC: 7 MMOL/L
AST SERPL W P-5'-P-CCNC: 12 U/L (ref 13–39)
BACTERIA UR QL AUTO: ABNORMAL /HPF
BILIRUB SERPL-MCNC: 0.86 MG/DL (ref 0.2–1)
BILIRUB UR QL STRIP: NEGATIVE
BUN SERPL-MCNC: 19 MG/DL (ref 5–25)
CALCIUM SERPL-MCNC: 9.4 MG/DL (ref 8.4–10.2)
CHLORIDE SERPL-SCNC: 105 MMOL/L (ref 96–108)
CHOLEST SERPL-MCNC: 97 MG/DL
CLARITY UR: ABNORMAL
CO2 SERPL-SCNC: 28 MMOL/L (ref 21–32)
COLOR UR: YELLOW
CREAT SERPL-MCNC: 0.73 MG/DL (ref 0.6–1.3)
ERYTHROCYTE [DISTWIDTH] IN BLOOD BY AUTOMATED COUNT: 13 % (ref 11.6–15.1)
EST. AVERAGE GLUCOSE BLD GHB EST-MCNC: 126 MG/DL
GFR SERPL CREATININE-BSD FRML MDRD: 95 ML/MIN/1.73SQ M
GLUCOSE P FAST SERPL-MCNC: 94 MG/DL (ref 65–99)
GLUCOSE UR STRIP-MCNC: NEGATIVE MG/DL
HBA1C MFR BLD: 6 %
HCT VFR BLD AUTO: 47.9 % (ref 36.5–49.3)
HDLC SERPL-MCNC: 27 MG/DL
HGB BLD-MCNC: 15.6 G/DL (ref 12–17)
HGB UR QL STRIP.AUTO: NEGATIVE
KETONES UR STRIP-MCNC: NEGATIVE MG/DL
LDLC SERPL CALC-MCNC: 57 MG/DL (ref 0–100)
LEUKOCYTE ESTERASE UR QL STRIP: NEGATIVE
MCH RBC QN AUTO: 30.6 PG (ref 26.8–34.3)
MCHC RBC AUTO-ENTMCNC: 32.6 G/DL (ref 31.4–37.4)
MCV RBC AUTO: 94 FL (ref 82–98)
NITRITE UR QL STRIP: NEGATIVE
NON-SQ EPI CELLS URNS QL MICRO: ABNORMAL /HPF
PH UR STRIP.AUTO: 7 [PH]
PLATELET # BLD AUTO: 338 THOUSANDS/UL (ref 149–390)
PMV BLD AUTO: 9.4 FL (ref 8.9–12.7)
POTASSIUM SERPL-SCNC: 4.1 MMOL/L (ref 3.5–5.3)
PROT SERPL-MCNC: 6.9 G/DL (ref 6.4–8.4)
PROT UR STRIP-MCNC: ABNORMAL MG/DL
RBC # BLD AUTO: 5.1 MILLION/UL (ref 3.88–5.62)
RBC #/AREA URNS AUTO: ABNORMAL /HPF
SODIUM SERPL-SCNC: 140 MMOL/L (ref 135–147)
SP GR UR STRIP.AUTO: 1.02 (ref 1–1.03)
TRIGL SERPL-MCNC: 65 MG/DL
TSH SERPL DL<=0.05 MIU/L-ACNC: 0.84 UIU/ML (ref 0.45–4.5)
UROBILINOGEN UR STRIP-ACNC: <2 MG/DL
WBC # BLD AUTO: 9.01 THOUSAND/UL (ref 4.31–10.16)
WBC #/AREA URNS AUTO: ABNORMAL /HPF

## 2024-03-05 PROCEDURE — 81001 URINALYSIS AUTO W/SCOPE: CPT

## 2024-03-05 PROCEDURE — 80053 COMPREHEN METABOLIC PANEL: CPT

## 2024-03-05 PROCEDURE — 84443 ASSAY THYROID STIM HORMONE: CPT

## 2024-03-05 PROCEDURE — 83036 HEMOGLOBIN GLYCOSYLATED A1C: CPT

## 2024-03-05 PROCEDURE — 36415 COLL VENOUS BLD VENIPUNCTURE: CPT

## 2024-03-05 PROCEDURE — 85027 COMPLETE CBC AUTOMATED: CPT

## 2024-03-05 PROCEDURE — 80061 LIPID PANEL: CPT

## 2024-03-07 DIAGNOSIS — I48.0 PAROXYSMAL ATRIAL FIBRILLATION (HCC): ICD-10-CM

## 2024-03-11 ENCOUNTER — APPOINTMENT (OUTPATIENT)
Dept: RADIOLOGY | Facility: CLINIC | Age: 69
End: 2024-03-11
Payer: COMMERCIAL

## 2024-03-11 ENCOUNTER — OFFICE VISIT (OUTPATIENT)
Dept: FAMILY MEDICINE CLINIC | Facility: CLINIC | Age: 69
End: 2024-03-11
Payer: COMMERCIAL

## 2024-03-11 VITALS
SYSTOLIC BLOOD PRESSURE: 100 MMHG | HEIGHT: 73 IN | BODY MASS INDEX: 29.95 KG/M2 | WEIGHT: 226 LBS | DIASTOLIC BLOOD PRESSURE: 60 MMHG | HEART RATE: 72 BPM

## 2024-03-11 DIAGNOSIS — E78.2 MIXED HYPERLIPIDEMIA: ICD-10-CM

## 2024-03-11 DIAGNOSIS — R35.1 BPH ASSOCIATED WITH NOCTURIA: ICD-10-CM

## 2024-03-11 DIAGNOSIS — I48.0 PAROXYSMAL ATRIAL FIBRILLATION (HCC): ICD-10-CM

## 2024-03-11 DIAGNOSIS — B35.3 TINEA PEDIS, UNSPECIFIED LATERALITY: ICD-10-CM

## 2024-03-11 DIAGNOSIS — E27.8 ADRENAL NODULE (HCC): ICD-10-CM

## 2024-03-11 DIAGNOSIS — Z72.0 TOBACCO ABUSE: ICD-10-CM

## 2024-03-11 DIAGNOSIS — I10 ESSENTIAL HYPERTENSION: Primary | ICD-10-CM

## 2024-03-11 DIAGNOSIS — E04.2 MULTIPLE THYROID NODULES: ICD-10-CM

## 2024-03-11 DIAGNOSIS — N40.1 BPH ASSOCIATED WITH NOCTURIA: ICD-10-CM

## 2024-03-11 DIAGNOSIS — S16.1XXD STRAIN OF NECK MUSCLE, SUBSEQUENT ENCOUNTER: ICD-10-CM

## 2024-03-11 DIAGNOSIS — D72.829 LEUKOCYTOSIS, UNSPECIFIED TYPE: ICD-10-CM

## 2024-03-11 DIAGNOSIS — R73.9 HYPERGLYCEMIA: ICD-10-CM

## 2024-03-11 DIAGNOSIS — J43.2 CENTRILOBULAR EMPHYSEMA (HCC): ICD-10-CM

## 2024-03-11 PROCEDURE — G2211 COMPLEX E/M VISIT ADD ON: HCPCS | Performed by: FAMILY MEDICINE

## 2024-03-11 PROCEDURE — 99214 OFFICE O/P EST MOD 30 MIN: CPT | Performed by: FAMILY MEDICINE

## 2024-03-11 PROCEDURE — 72050 X-RAY EXAM NECK SPINE 4/5VWS: CPT

## 2024-03-11 RX ORDER — KETOCONAZOLE 20 MG/G
CREAM TOPICAL DAILY
Qty: 30 G | Refills: 1 | Status: SHIPPED | OUTPATIENT
Start: 2024-03-11

## 2024-03-11 RX ORDER — CYCLOBENZAPRINE HCL 5 MG
TABLET ORAL
Qty: 30 TABLET | Refills: 1 | Status: SHIPPED | OUTPATIENT
Start: 2024-03-11

## 2024-03-11 NOTE — PROGRESS NOTES
Name: Sushil Harrison      : 1955      MRN: [id number]  Encounter Provider: Herminio Luna DO  Encounter Date: 3/11/2024   Encounter department: [facility]    Assessment & Plan     1. Essential hypertension  Assessment & Plan:  Stable continue present therapy    Orders:  -     CBC; Future; Expected date: 2024  -     Comprehensive metabolic panel; Future; Expected date: 2024  -     Hemoglobin A1C; Future; Expected date: 2024  -     Lipid Panel with Direct LDL reflex; Future; Expected date: 2024  -     TSH, 3rd generation with Free T4 reflex; Future; Expected date: 2024  -     UA (URINE) with reflex to Scope; Future; Expected date: 2024  -     PSA Total, Diagnostic; Future; Expected date: 2024  -     LDL cholesterol, direct; Future; Expected date: 2024    2. Centrilobular emphysema (HCC)  Assessment & Plan:  Stable, lungs are clear    Orders:  -     CBC; Future; Expected date: 2024  -     Comprehensive metabolic panel; Future; Expected date: 2024  -     Hemoglobin A1C; Future; Expected date: 2024  -     Lipid Panel with Direct LDL reflex; Future; Expected date: 2024  -     TSH, 3rd generation with Free T4 reflex; Future; Expected date: 2024  -     UA (URINE) with reflex to Scope; Future; Expected date: 2024  -     PSA Total, Diagnostic; Future; Expected date: 2024  -     LDL cholesterol, direct; Future; Expected date: 2024    3. Multiple thyroid nodules  Assessment & Plan:  Stable follows with Dr. Hall on a yearly basis    Orders:  -     CBC; Future; Expected date: 2024  -     Comprehensive metabolic panel; Future; Expected date: 2024  -     Hemoglobin A1C; Future; Expected date: 2024  -     Lipid Panel with Direct LDL reflex; Future; Expected date: 2024  -     TSH, 3rd generation with Free T4 reflex; Future; Expected date: 2024  -     UA (URINE) with reflex to  Scope; Future; Expected date: 09/11/2024  -     PSA Total, Diagnostic; Future; Expected date: 09/11/2024  -     LDL cholesterol, direct; Future; Expected date: 09/11/2024    4. Paroxysmal atrial fibrillation (HCC)  Assessment & Plan:  Stable follows with cardiology status post cardioversion    Orders:  -     CBC; Future; Expected date: 09/11/2024  -     Comprehensive metabolic panel; Future; Expected date: 09/11/2024  -     Hemoglobin A1C; Future; Expected date: 09/11/2024  -     Lipid Panel with Direct LDL reflex; Future; Expected date: 09/11/2024  -     TSH, 3rd generation with Free T4 reflex; Future; Expected date: 09/11/2024  -     UA (URINE) with reflex to Scope; Future; Expected date: 09/11/2024  -     PSA Total, Diagnostic; Future; Expected date: 09/11/2024  -     LDL cholesterol, direct; Future; Expected date: 09/11/2024    5. Strain of neck muscle, subsequent encounter  Assessment & Plan:  Flexeril at bedtime, x-ray cervical spine, patient declined physical therapy    Orders:  -     CBC; Future; Expected date: 09/11/2024  -     Comprehensive metabolic panel; Future; Expected date: 09/11/2024  -     Hemoglobin A1C; Future; Expected date: 09/11/2024  -     Lipid Panel with Direct LDL reflex; Future; Expected date: 09/11/2024  -     TSH, 3rd generation with Free T4 reflex; Future; Expected date: 09/11/2024  -     UA (URINE) with reflex to Scope; Future; Expected date: 09/11/2024  -     PSA Total, Diagnostic; Future; Expected date: 09/11/2024  -     LDL cholesterol, direct; Future; Expected date: 09/11/2024  -     cyclobenzaprine (FLEXERIL) 5 mg tablet; Take 1 tablet at bedtime as needed for muscle spasm of neck  -     XR spine cervical complete 4 or 5 vw non injury; Future; Expected date: 03/11/2024    6. Adrenal nodule (HCC)  Assessment & Plan:  Patient follows with endocrinologist on a yearly basis, Dr. Hall    Orders:  -     CBC; Future; Expected date: 09/11/2024  -     Comprehensive metabolic panel;  Future; Expected date: 09/11/2024  -     Hemoglobin A1C; Future; Expected date: 09/11/2024  -     Lipid Panel with Direct LDL reflex; Future; Expected date: 09/11/2024  -     TSH, 3rd generation with Free T4 reflex; Future; Expected date: 09/11/2024  -     UA (URINE) with reflex to Scope; Future; Expected date: 09/11/2024  -     PSA Total, Diagnostic; Future; Expected date: 09/11/2024  -     LDL cholesterol, direct; Future; Expected date: 09/11/2024    7. BPH associated with nocturia  Assessment & Plan:  PSA is ordered    Orders:  -     CBC; Future; Expected date: 09/11/2024  -     Comprehensive metabolic panel; Future; Expected date: 09/11/2024  -     Hemoglobin A1C; Future; Expected date: 09/11/2024  -     Lipid Panel with Direct LDL reflex; Future; Expected date: 09/11/2024  -     TSH, 3rd generation with Free T4 reflex; Future; Expected date: 09/11/2024  -     UA (URINE) with reflex to Scope; Future; Expected date: 09/11/2024  -     PSA Total, Diagnostic; Future; Expected date: 09/11/2024  -     LDL cholesterol, direct; Future; Expected date: 09/11/2024    8. Hyperglycemia  Assessment & Plan:  Diet controlled    Orders:  -     CBC; Future; Expected date: 09/11/2024  -     Comprehensive metabolic panel; Future; Expected date: 09/11/2024  -     Hemoglobin A1C; Future; Expected date: 09/11/2024  -     Lipid Panel with Direct LDL reflex; Future; Expected date: 09/11/2024  -     TSH, 3rd generation with Free T4 reflex; Future; Expected date: 09/11/2024  -     UA (URINE) with reflex to Scope; Future; Expected date: 09/11/2024  -     PSA Total, Diagnostic; Future; Expected date: 09/11/2024  -     LDL cholesterol, direct; Future; Expected date: 09/11/2024    9. Leukocytosis, unspecified type  Assessment & Plan:  Normal at the present time hematology says no specific follow-up is needed    Orders:  -     CBC; Future; Expected date: 09/11/2024  -     Comprehensive metabolic panel; Future; Expected date: 09/11/2024  -      Hemoglobin A1C; Future; Expected date: 09/11/2024  -     Lipid Panel with Direct LDL reflex; Future; Expected date: 09/11/2024  -     TSH, 3rd generation with Free T4 reflex; Future; Expected date: 09/11/2024  -     UA (URINE) with reflex to Scope; Future; Expected date: 09/11/2024  -     PSA Total, Diagnostic; Future; Expected date: 09/11/2024  -     LDL cholesterol, direct; Future; Expected date: 09/11/2024    10. Mixed hyperlipidemia  Assessment & Plan:  Stable continue atorvastatin 40 mg daily    Orders:  -     CBC; Future; Expected date: 09/11/2024  -     Comprehensive metabolic panel; Future; Expected date: 09/11/2024  -     Hemoglobin A1C; Future; Expected date: 09/11/2024  -     Lipid Panel with Direct LDL reflex; Future; Expected date: 09/11/2024  -     TSH, 3rd generation with Free T4 reflex; Future; Expected date: 09/11/2024  -     UA (URINE) with reflex to Scope; Future; Expected date: 09/11/2024  -     PSA Total, Diagnostic; Future; Expected date: 09/11/2024  -     LDL cholesterol, direct; Future; Expected date: 09/11/2024    11. Tobacco abuse  Assessment & Plan:  Complete cessation recommended up-to-date with CT screening    Orders:  -     CBC; Future; Expected date: 09/11/2024  -     Comprehensive metabolic panel; Future; Expected date: 09/11/2024  -     Hemoglobin A1C; Future; Expected date: 09/11/2024  -     Lipid Panel with Direct LDL reflex; Future; Expected date: 09/11/2024  -     TSH, 3rd generation with Free T4 reflex; Future; Expected date: 09/11/2024  -     UA (URINE) with reflex to Scope; Future; Expected date: 09/11/2024  -     PSA Total, Diagnostic; Future; Expected date: 09/11/2024  -     LDL cholesterol, direct; Future; Expected date: 09/11/2024    12. Tinea pedis, unspecified laterality  -     ketoconazole (NIZORAL) 2 % cream; Apply topically daily  -     CBC; Future; Expected date: 09/11/2024  -     Comprehensive metabolic panel; Future; Expected date: 09/11/2024  -     Hemoglobin A1C;  Future; Expected date: 09/11/2024  -     Lipid Panel with Direct LDL reflex; Future; Expected date: 09/11/2024  -     TSH, 3rd generation with Free T4 reflex; Future; Expected date: 09/11/2024  -     UA (URINE) with reflex to Scope; Future; Expected date: 09/11/2024  -     PSA Total, Diagnostic; Future; Expected date: 09/11/2024  -     LDL cholesterol, direct; Future; Expected date: 09/11/2024        Depression Screening and Follow-up Plan: Patient was screened for depression during today's encounter. They screened negative with a PHQ-2 score of 0.        Subjective      Patient still with some mild spasm and neck pain only in the morning patient stated lately he wakes up after an hour or 2 it completely resolves.  Seems to be all left-sided..  Blood work was discussed with the patient.  Patient does have some athlete's foot at time he requests a prescription for Nizoral cream.  Unfortunately, patient does continue to smoke      Review of Systems   Constitutional: Negative.    HENT: Negative.     Eyes: Negative.    Respiratory: Negative.     Cardiovascular: Negative.    Gastrointestinal: Negative.    Endocrine: Negative.    Genitourinary: Negative.    Musculoskeletal:         HPI   Skin:         HPI   Allergic/Immunologic: Negative.    Neurological: Negative.    Hematological: Negative.    Psychiatric/Behavioral: Negative.         Current Outpatient Medications on File Prior to Visit   Medication Sig   • amLODIPine (NORVASC) 5 mg tablet Take 1 tablet (5 mg total) by mouth 2 (two) times a day   • apixaban (Eliquis) 5 mg Take 1 tablet (5 mg total) by mouth 2 (two) times a day   • atorvastatin (LIPITOR) 40 mg tablet Take 1 tablet (40 mg total) by mouth daily   • Cholecalciferol (VITAMIN D3) 1000 units CAPS Take 5,000 Units by mouth 3 (three) times a week   • Coenzyme Q10 (CO Q-10) 200 MG CAPS Patient states that he takes this on a daily basis.    • Cyanocobalamin ER 1000 MCG TBCR 1 daily   • flecainide (TAMBOCOR) 100  "mg tablet take 1 tablet by mouth every 12 hours   • flecainide (TAMBOCOR) 50 mg tablet Take 0.5 tablets (25 mg total) by mouth 2 (two) times a day Take half pill twice daily in addition to 100 mg twice daily pill pill twice daily.   • fluticasone (FLONASE) 50 mcg/act nasal spray 2 sprays into each nostril daily   • lisinopril (ZESTRIL) 20 mg tablet One tablet twice a day   • LORazepam (ATIVAN) 1 mg tablet Take 1 tablet (1 mg total) by mouth 2 (two) times a day As needed for anxiety   • metoprolol tartrate (LOPRESSOR) 25 mg tablet Take 1 tablet (25 mg total) by mouth 2 (two) times a day   • Multiple Vitamins-Minerals (MULTIVITAMIN ADULT PO) take 1 by Oral route once   • selenium 200 mcg take one tablet daily   • tamsulosin (FLOMAX) 0.4 mg Take 1 capsule (0.4 mg total) by mouth daily with dinner   • acyclovir (ZOVIRAX) 800 mg tablet Take 1 tablet (800 mg total) by mouth 2 (two) times a day (Patient taking differently: Take 800 mg by mouth if needed)   • [DISCONTINUED] methocarbamol (ROBAXIN) 500 mg tablet Take 1 tablet (500 mg total) by mouth 3 (three) times a day as needed for muscle spasms   • [DISCONTINUED] methylPREDNISolone 4 MG tablet therapy pack Use as directed on package   • [DISCONTINUED] neomycin-polymyxin-hydrocortisone (CORTISPORIN) 0.35%-10,000 units/mL-1% otic suspension Administer 3 drops to the right ear 4 (four) times a day for 5 days   • [DISCONTINUED] ZINC PICOLINATE PO Take 22 mg by mouth daily (Patient not taking: Reported on 3/11/2024)       Objective     /60   Pulse 72   Ht 6' 1\" (1.854 m)   Wt 103 kg (226 lb)   BMI 29.82 kg/m²     Physical Exam  Vitals and nursing note reviewed.   Constitutional:       Appearance: Normal appearance.   HENT:      Head: Normocephalic and atraumatic.      Mouth/Throat:      Mouth: Mucous membranes are moist.   Eyes:      General: No scleral icterus.  Neck:      Vascular: No carotid bruit.   Cardiovascular:      Rate and Rhythm: Normal rate and regular " rhythm.      Heart sounds: Normal heart sounds.   Pulmonary:      Effort: Pulmonary effort is normal.      Breath sounds: Normal breath sounds.   Abdominal:      General: Bowel sounds are normal.      Palpations: Abdomen is soft.      Tenderness: There is no abdominal tenderness.   Musculoskeletal:         General: No tenderness.      Cervical back: Neck supple.      Right lower leg: No edema.      Left lower leg: No edema.      Comments: Mild prior to muscle contraction cervical spine left more so than right mild mild spasm of left sternocleidomastoid and left Peezy is musculature negative point tenderness symmetrical range of motion   Skin:     General: Skin is warm and dry.   Neurological:      General: No focal deficit present.      Mental Status: He is alert.   Psychiatric:         Mood and Affect: Mood normal.       Herminio Luna DO

## 2024-03-11 NOTE — PATIENT INSTRUCTIONS
I recommend Flexeril at bedtime for muscle spasm this will cause drowsiness  Complete neck x-ray  Return in few weeks if still with symptoms  I recommend complete tobacco cessation  Return in 6 months for office visit, blood work, and AWV

## 2024-03-12 ENCOUNTER — TELEPHONE (OUTPATIENT)
Age: 69
End: 2024-03-12

## 2024-03-12 DIAGNOSIS — M15.9 PRIMARY OSTEOARTHRITIS INVOLVING MULTIPLE JOINTS: ICD-10-CM

## 2024-03-12 DIAGNOSIS — S16.1XXD STRAIN OF NECK MUSCLE, SUBSEQUENT ENCOUNTER: ICD-10-CM

## 2024-03-12 DIAGNOSIS — M48.02 CERVICAL SPINAL STENOSIS: Primary | ICD-10-CM

## 2024-03-12 PROBLEM — M19.90 OSTEOARTHRITIS: Status: ACTIVE | Noted: 2024-03-12

## 2024-03-12 NOTE — TELEPHONE ENCOUNTER
"Received call from patient to review X ray results. Gave him the following information per Dr. Luna:\"Patient has mild arthritis and disc space narrowing.  If patient continues with pain I recommend physical therapy and if that does not resolve I would recommend MRI of cervical spine \"    Patient is agreeable to physical therapy referral and would like to know which facility Dr. Luna would recommend.  "

## 2024-03-19 ENCOUNTER — EVALUATION (OUTPATIENT)
Dept: PHYSICAL THERAPY | Facility: REHABILITATION | Age: 69
End: 2024-03-19
Payer: COMMERCIAL

## 2024-03-19 DIAGNOSIS — M48.02 CERVICAL SPINAL STENOSIS: ICD-10-CM

## 2024-03-19 DIAGNOSIS — M15.9 PRIMARY OSTEOARTHRITIS INVOLVING MULTIPLE JOINTS: ICD-10-CM

## 2024-03-19 DIAGNOSIS — S16.1XXD STRAIN OF NECK MUSCLE, SUBSEQUENT ENCOUNTER: ICD-10-CM

## 2024-03-19 PROCEDURE — 97110 THERAPEUTIC EXERCISES: CPT | Performed by: PHYSICAL THERAPIST

## 2024-03-19 PROCEDURE — 97161 PT EVAL LOW COMPLEX 20 MIN: CPT | Performed by: PHYSICAL THERAPIST

## 2024-03-19 NOTE — PROGRESS NOTES
PT Evaluation     Today's date: 3/19/2024  Patient name: Sushil Harrison  : 1955  MRN: [id number]  Referring provider: Herminio Luna DO  Dx:   Encounter Diagnosis     ICD-10-CM    1. Cervical spinal stenosis  M48.02 Ambulatory Referral to Physical Therapy      2. Primary osteoarthritis involving multiple joints  M15.9 Ambulatory Referral to Physical Therapy      3. Strain of neck muscle, subsequent encounter  S16.1XXD Ambulatory Referral to Physical Therapy          Start Time: 0950  Stop Time: 1035  Total time in clinic (min): 45 minutes    Assessment  Assessment details: Patient is a 68 y.o. male presenting to initial examination with chief complaint of neck pain. Signs and symptoms are consistent with neck pain with mobility TBC. Primary impairments include L cervical rotation ROM deficits and thoracic hypomobility. As a result of impairments patient experiences limitations with functional/daily activities including looking over L shoulder while driving and shooting shotgun. Educated patient regarding plan of care and answered all patient questions to patient satisfaction. Patient would benefit from skilled PT interventions to address above impairments, achieve goals, and to maximize function. Thank you for the referral.    Impairments: abnormal muscle firing, abnormal or restricted ROM, abnormal movement, activity intolerance, impaired physical strength and pain with function     Prognosis: good    Goals  Impairment Goals: 4-6 weeks  - Patient to decrease pain to 0/10  - Patient to improve L cervical rotation to 70 degrees    Functional Goals: by discharge  - Patient to discharge to independent [facility]  - Patient to return to prior level of function  - Patient to improve tolerance to shooting shotgun   - Patient to improve tolerance to looking over shoulder while driving     Plan  Patient would benefit from: skilled physical therapy  Planned modality interventions: cryotherapy, TENS and thermotherapy:  hydrocollator packs  Planned therapy interventions: flexibility, home exercise program, joint mobilization, manual therapy, neuromuscular re-education, patient education, strengthening, stretching, therapeutic activities, therapeutic exercise and functional ROM exercises  Frequency: 1x week  Duration in weeks: 6  Treatment plan discussed with: patient        Subjective Evaluation    History of Present Illness  Mechanism of injury: HISTORY OF PRESENT ILLNESS: Patient reports onset of L-sided neck pain beginning in 2024 upon awakening. No KEL reported. He was prescribed muscle relaxer and steroid and the pain resolved. He continues to note limitations with rotating his head quickly to the L. He reports occasional tingling in R arm which is quickly relieved with any arm movement. He is having difficulty shooting shotgun and looking over his L shoulder while driving. No radicular pain or B/B changes. No gait disturbances.   PRIOR TREATMENT: none  AGGRAVATING FACTORS: L rotation, shooting shotgun    EASING FACTORS: topical cream  WORK: retired  IMAGING: x-rays notable for degenerative changes  FUNCTIONAL LIMITATIONS: looking over L shoulder while driving and shooting shotgun  SUBJECTIVE FUNCTIONAL LEVEL: 95%  PATIENT GOAL: to resolve some of the discomfort  Pain  Current pain ratin  At best pain ratin  At worst pain ratin  Location: neck  Quality: tight          Objective     Palpation     Additional Palpation Details  No TTP    Neurological Testing     Sensation   Cervical/Thoracic   Left   Intact: light touch    Right   Intact: light touch    Reflexes   Left   Biceps (C5/C6): absent (0)  Brachioradialis (C6): normal (2+)  Triceps (C7): normal (2+)    Right   Biceps (C5/C6): absent (0)  Brachioradialis (C6): absent (0)  Triceps (C7): normal (2+)    Active Range of Motion   Cervical/Thoracic Spine       Cervical    Flexion: 65 degrees   Extension: 45 degrees      Left lateral flexion: 35 degrees      "with pain  Right lateral flexion: 35 degrees      Left rotation: 55 degrees with pain  Right rotation: 70 degrees       Joint Play   Joints within functional limits: C2, C3, C4, C5, C6 and C7     Hypomobile: T1, T2, T3, T4, T5, T6, T7, T8 and T9     Strength/Myotome Testing   Cervical Spine     Left   Normal strength    Right   Normal strength    Tests   Cervical     Left   Negative cervical flexion-rotation test.     Right   Negative cervical flexion-rotation test.     Left Shoulder   Negative ULTT1, ULTT4 and cervical rotation lateral flexion.     Right Shoulder   Negative ULTT1, ULTT4 and cervical rotation lateral flexion.       Flowsheet Rows      Flowsheet Row Most Recent Value   PT/OT G-Codes    Current Score 72   Projected Score 76               Diagnosis: neck pain with mobility TBC   Precautions: HTN, a-fib   Primary impairments: L cervical rotation ROM deficits and thoracic hypomobility   *asterisks by exercise = given for HEP    3/19       Manuals        Thoracic CPA mobilizations        Cervical rot PROM                                There Ex        UBE        Cervical rot SNAG *  5\" x 10       Cervical ext SNAG        Cervical rot AROM        Seated thoracic ext over chair                                        Neuro Re-Ed        Scap retractions        Slouch overcorrect        Band ext        Band no moneys        Prone T and Y                                                                Re-evaluation             Ther Act/Gait                                         Modalities                                                          "

## 2024-03-26 DIAGNOSIS — I48.0 PAROXYSMAL ATRIAL FIBRILLATION (HCC): ICD-10-CM

## 2024-03-29 ENCOUNTER — OFFICE VISIT (OUTPATIENT)
Dept: PHYSICAL THERAPY | Facility: REHABILITATION | Age: 69
End: 2024-03-29
Payer: COMMERCIAL

## 2024-03-29 DIAGNOSIS — S16.1XXD STRAIN OF NECK MUSCLE, SUBSEQUENT ENCOUNTER: ICD-10-CM

## 2024-03-29 DIAGNOSIS — M15.9 PRIMARY OSTEOARTHRITIS INVOLVING MULTIPLE JOINTS: ICD-10-CM

## 2024-03-29 DIAGNOSIS — M48.02 CERVICAL SPINAL STENOSIS: Primary | ICD-10-CM

## 2024-03-29 PROCEDURE — 97112 NEUROMUSCULAR REEDUCATION: CPT | Performed by: PHYSICAL THERAPIST

## 2024-03-29 PROCEDURE — 97140 MANUAL THERAPY 1/> REGIONS: CPT | Performed by: PHYSICAL THERAPIST

## 2024-03-29 PROCEDURE — 97110 THERAPEUTIC EXERCISES: CPT | Performed by: PHYSICAL THERAPIST

## 2024-03-29 NOTE — PROGRESS NOTES
"Daily Note     Today's date: 3/29/2024  Patient name: Sushil Harrison  : 1955  MRN: [id number]  Referring provider: Herminio Luna DO  Dx:   Encounter Diagnosis     ICD-10-CM    1. Cervical spinal stenosis  M48.02       2. Primary osteoarthritis involving multiple joints  M15.9       3. Strain of neck muscle, subsequent encounter  S16.1XXD           Start Time: 1400  Stop Time: 1445  Total time in clinic (min): 45 minutes    Subjective: Patient had symptom aggravation with the towel rotation SNAG and has stopped as a result. He feels fine rotating without the towel      Objective: See treatment diary below      Assessment: Tolerated treatment well. Improved cervical rotation following manual PROM. Generalized thoracic hypomobility appreciated during PA mobilizations. Instructed patient to stretch prior to going to the range to shoot anabella birds to improve flexibility and to warm-up prior to quick rotational movements. Updated HEP to include band extensions and provided band for home use. Patient exhibited good technique with therapeutic exercises and would benefit from continued PT      Plan: Continue per plan of care.      Diagnosis: neck pain with mobility TBC   Precautions: HTN, a-fib   Primary impairments: L cervical rotation ROM deficits and thoracic hypomobility   *asterisks by exercise = given for HEP    3/19 3/29      Manuals        Thoracic CPA mobilizations   10' (grade II)      Cervical rot PROM   10'                              There Ex        UBE        Cervical rot SNAG *  5\" x 10  5\" x 10      Cervical ext SNAG        Cervical rot AROM   5\" x 10      Seated thoracic ext over chair                                        Neuro Re-Ed        Scap retractions        Slouch overcorrect        Band ext   Green x 20      Band no moneys   Green x 20      Prone T and Y                                                                Re-evaluation             Ther Act/Gait                               "           Modalities

## 2024-03-31 RX ORDER — FLECAINIDE ACETATE 50 MG/1
TABLET ORAL
Qty: 180 TABLET | Refills: 3 | Status: SHIPPED | OUTPATIENT
Start: 2024-03-31

## 2024-04-04 ENCOUNTER — APPOINTMENT (OUTPATIENT)
Dept: PHYSICAL THERAPY | Facility: REHABILITATION | Age: 69
End: 2024-04-04
Payer: COMMERCIAL

## 2024-04-10 ENCOUNTER — OFFICE VISIT (OUTPATIENT)
Dept: PHYSICAL THERAPY | Facility: REHABILITATION | Age: 69
End: 2024-04-10
Payer: COMMERCIAL

## 2024-04-10 DIAGNOSIS — S16.1XXD STRAIN OF NECK MUSCLE, SUBSEQUENT ENCOUNTER: ICD-10-CM

## 2024-04-10 DIAGNOSIS — M15.9 PRIMARY OSTEOARTHRITIS INVOLVING MULTIPLE JOINTS: ICD-10-CM

## 2024-04-10 DIAGNOSIS — M48.02 CERVICAL SPINAL STENOSIS: Primary | ICD-10-CM

## 2024-04-10 PROCEDURE — 97110 THERAPEUTIC EXERCISES: CPT | Performed by: PHYSICAL THERAPIST

## 2024-04-10 PROCEDURE — 97112 NEUROMUSCULAR REEDUCATION: CPT | Performed by: PHYSICAL THERAPIST

## 2024-04-10 PROCEDURE — 97140 MANUAL THERAPY 1/> REGIONS: CPT | Performed by: PHYSICAL THERAPIST

## 2024-04-17 ENCOUNTER — APPOINTMENT (OUTPATIENT)
Dept: PHYSICAL THERAPY | Facility: REHABILITATION | Age: 69
End: 2024-04-17
Payer: COMMERCIAL

## 2024-05-10 ENCOUNTER — TELEPHONE (OUTPATIENT)
Age: 69
End: 2024-05-10

## 2024-05-10 NOTE — TELEPHONE ENCOUNTER
Caller: Jaskaran Harrison     Doctor: Dr. Dietz     Reason for call: patient called to schedule appt an request blood work to be done before appt. CBC w/ diff., Magnesium, lipid panel w/ direct LDL, CMP and NT-0BNP PRO. Please call patient once orders are placed in system.     Call back#: [phone]

## 2024-05-16 DIAGNOSIS — I48.0 PAROXYSMAL ATRIAL FIBRILLATION (HCC): Primary | ICD-10-CM

## 2024-05-16 DIAGNOSIS — I11.9 HYPERTENSIVE HEART DISEASE WITHOUT HEART FAILURE: ICD-10-CM

## 2024-05-16 DIAGNOSIS — E78.2 MIXED HYPERLIPIDEMIA: ICD-10-CM

## 2024-05-16 NOTE — TELEPHONE ENCOUNTER
Received call from POD, patient calling again for labs to be ordered.  Advised will message Dr Dietz.  Will call patient when orders placed.    TC Dr Dietz

## 2024-05-16 NOTE — TELEPHONE ENCOUNTER
PTT 49.7 no change in heparin rate   Caller: Jaskaran Harrison     Doctor: Carlita Dietz MD     Reason for call: Pt has appt on 6/26 would like to have Blood work completed by this time to discuss at appt. Pt requesting an order for the following blood work to be put in chart:CBC w/ Diff., Magnesium, Lipid panel w/ direct LDL, CMP and NT-0BNP PRO. Please contact Pt when order is available     Call back#: [phone]

## 2024-05-17 NOTE — TELEPHONE ENCOUNTER
Orders placed last evening, patient called at [phone] requesting scripts be emailed.  Patient also requesting script for direct LDL.  Advised will need to speak with Dr Dietz, he is seeing patients, will be later today.    Patient called again at 915, stating he frustrated and thinking about leaving [facility].  States he will  scripts at  on Mon.    Discussed with Dr Dietz, he spoke with patient.  States he will place order for direct LDL.

## 2024-05-22 ENCOUNTER — APPOINTMENT (OUTPATIENT)
Dept: LAB | Facility: CLINIC | Age: 69
End: 2024-05-22
Payer: COMMERCIAL

## 2024-05-22 ENCOUNTER — TELEPHONE (OUTPATIENT)
Age: 69
End: 2024-05-22

## 2024-05-22 DIAGNOSIS — I11.9 HYPERTENSIVE HEART DISEASE WITHOUT HEART FAILURE: ICD-10-CM

## 2024-05-22 DIAGNOSIS — I48.0 PAROXYSMAL ATRIAL FIBRILLATION (HCC): ICD-10-CM

## 2024-05-22 DIAGNOSIS — E78.2 MIXED HYPERLIPIDEMIA: ICD-10-CM

## 2024-05-22 LAB
ALBUMIN SERPL BCP-MCNC: 4.2 G/DL (ref 3.5–5)
ALP SERPL-CCNC: 58 U/L (ref 34–104)
ALT SERPL W P-5'-P-CCNC: 13 U/L (ref 7–52)
ANION GAP SERPL CALCULATED.3IONS-SCNC: 10 MMOL/L (ref 4–13)
AST SERPL W P-5'-P-CCNC: 12 U/L (ref 13–39)
BASOPHILS # BLD AUTO: 0.05 THOUSANDS/ÂΜL (ref 0–0.1)
BASOPHILS NFR BLD AUTO: 1 % (ref 0–1)
BILIRUB SERPL-MCNC: 1.07 MG/DL (ref 0.2–1)
BNP SERPL-MCNC: 139 PG/ML (ref 0–100)
BUN SERPL-MCNC: 15 MG/DL (ref 5–25)
CALCIUM SERPL-MCNC: 9 MG/DL (ref 8.4–10.2)
CHLORIDE SERPL-SCNC: 105 MMOL/L (ref 96–108)
CHOLEST SERPL-MCNC: 104 MG/DL
CO2 SERPL-SCNC: 25 MMOL/L (ref 21–32)
CREAT SERPL-MCNC: 0.61 MG/DL (ref 0.6–1.3)
EOSINOPHIL # BLD AUTO: 0.2 THOUSAND/ÂΜL (ref 0–0.61)
EOSINOPHIL NFR BLD AUTO: 3 % (ref 0–6)
ERYTHROCYTE [DISTWIDTH] IN BLOOD BY AUTOMATED COUNT: 13.2 % (ref 11.6–15.1)
GFR SERPL CREATININE-BSD FRML MDRD: 102 ML/MIN/1.73SQ M
GLUCOSE P FAST SERPL-MCNC: 101 MG/DL (ref 65–99)
HCT VFR BLD AUTO: 46.7 % (ref 36.5–49.3)
HDLC SERPL-MCNC: 27 MG/DL
HGB BLD-MCNC: 15.7 G/DL (ref 12–17)
IMM GRANULOCYTES # BLD AUTO: 0.03 THOUSAND/UL (ref 0–0.2)
IMM GRANULOCYTES NFR BLD AUTO: 0 % (ref 0–2)
LDLC SERPL CALC-MCNC: 66 MG/DL (ref 0–100)
LYMPHOCYTES # BLD AUTO: 1.48 THOUSANDS/ÂΜL (ref 0.6–4.47)
LYMPHOCYTES NFR BLD AUTO: 22 % (ref 14–44)
MAGNESIUM SERPL-MCNC: 2.1 MG/DL (ref 1.9–2.7)
MCH RBC QN AUTO: 30.6 PG (ref 26.8–34.3)
MCHC RBC AUTO-ENTMCNC: 33.6 G/DL (ref 31.4–37.4)
MCV RBC AUTO: 91 FL (ref 82–98)
MONOCYTES # BLD AUTO: 0.57 THOUSAND/ÂΜL (ref 0.17–1.22)
MONOCYTES NFR BLD AUTO: 9 % (ref 4–12)
NEUTROPHILS # BLD AUTO: 4.38 THOUSANDS/ÂΜL (ref 1.85–7.62)
NEUTS SEG NFR BLD AUTO: 65 % (ref 43–75)
NONHDLC SERPL-MCNC: 77 MG/DL
NRBC BLD AUTO-RTO: 0 /100 WBCS
PLATELET # BLD AUTO: 301 THOUSANDS/UL (ref 149–390)
PMV BLD AUTO: 9.9 FL (ref 8.9–12.7)
POTASSIUM SERPL-SCNC: 4.1 MMOL/L (ref 3.5–5.3)
PROT SERPL-MCNC: 7 G/DL (ref 6.4–8.4)
RBC # BLD AUTO: 5.13 MILLION/UL (ref 3.88–5.62)
SODIUM SERPL-SCNC: 140 MMOL/L (ref 135–147)
T4 FREE SERPL-MCNC: 1.07 NG/DL (ref 0.61–1.12)
TRIGL SERPL-MCNC: 54 MG/DL
TSH SERPL DL<=0.05 MIU/L-ACNC: 0.82 UIU/ML (ref 0.45–4.5)
WBC # BLD AUTO: 6.71 THOUSAND/UL (ref 4.31–10.16)

## 2024-05-22 PROCEDURE — 83735 ASSAY OF MAGNESIUM: CPT

## 2024-05-22 PROCEDURE — 80061 LIPID PANEL: CPT

## 2024-05-22 PROCEDURE — 80053 COMPREHEN METABOLIC PANEL: CPT

## 2024-05-22 PROCEDURE — 83880 ASSAY OF NATRIURETIC PEPTIDE: CPT

## 2024-05-22 PROCEDURE — 84439 ASSAY OF FREE THYROXINE: CPT

## 2024-05-22 PROCEDURE — 85025 COMPLETE CBC W/AUTO DIFF WBC: CPT | Performed by: INTERNAL MEDICINE

## 2024-05-22 PROCEDURE — 36415 COLL VENOUS BLD VENIPUNCTURE: CPT

## 2024-05-22 PROCEDURE — 84443 ASSAY THYROID STIM HORMONE: CPT

## 2024-05-22 NOTE — TELEPHONE ENCOUNTER
Patient has a question on his Bilirubin.  He had blood work drawn today that was ordered by cardiologist and he noticed that his Bilirubin is higher than it was in March and he would like to discuss this with Dr. Luna.   Patient is asking if Dr. Luna could please give him a call to discuss this.

## 2024-05-23 ENCOUNTER — TELEMEDICINE (OUTPATIENT)
Dept: FAMILY MEDICINE CLINIC | Facility: CLINIC | Age: 69
End: 2024-05-23
Payer: COMMERCIAL

## 2024-05-23 DIAGNOSIS — R17 ELEVATED BILIRUBIN: ICD-10-CM

## 2024-05-23 DIAGNOSIS — R73.9 HYPERGLYCEMIA: Primary | ICD-10-CM

## 2024-05-23 PROCEDURE — 99213 OFFICE O/P EST LOW 20 MIN: CPT | Performed by: FAMILY MEDICINE

## 2024-05-23 PROCEDURE — G2211 COMPLEX E/M VISIT ADD ON: HCPCS | Performed by: FAMILY MEDICINE

## 2024-05-23 NOTE — ASSESSMENT & PLAN NOTE
Discussed patient's recent blood work patient's sugar is very mildly elevated at 101 with a hemoglobin A1c less than 2 months ago 6.0.  I am recommending low sugar low carbohydrate diet.  Patient discussed metformin patient will be reevaluated in September with blood work if hemoglobin A1c goes up I recommend starting metformin at that point

## 2024-05-23 NOTE — PATIENT INSTRUCTIONS
Low sugar low carbohydrate diet recommended  Continue all present therapy  Return at scheduled appointment sooner if needed

## 2024-05-23 NOTE — PROGRESS NOTES
Virtual Regular Visit  It was my intent to perform this visit via video technology but the patient was not able to do a video connection so the visit was completed via audio telephone only.   Verification of patient location:    Patient is located at Home in the following state in which I hold an active license [place]      Assessment/Plan:    Problem List Items Addressed This Visit        Other    Hyperglycemia - Primary     Discussed patient's recent blood work patient's sugar is very mildly elevated at 101 with a hemoglobin A1c less than 2 months ago 6.0.  I am recommending low sugar low carbohydrate diet.  Patient discussed metformin patient will be reevaluated in September with blood work if hemoglobin A1c goes up I recommend starting metformin at that point        Other Visit Diagnoses     Elevated bilirubin          Discussed very minimally elevated bilirubin of 1.07.  I believe this is not clinically relevant and we will just continue to monitor         Reason for visit is   Chief Complaint   Patient presents with   • Virtual Regular Visit        Encounter provider Herminio Luna DO      Recent Visits  No visits were found meeting these conditions.  Showing recent visits within past 7 days and meeting all other requirements  Today's Visits  Date Type Provider Dept   05/23/24 Telemedicine Herminio Luna DO [facility]   Showing today's visits and meeting all other requirements  Future Appointments  No visits were found meeting these conditions.  Showing future appointments within next 150 days and meeting all other requirements       The patient was identified by name and date of birth. Sushil Harrison was informed that this is a telemedicine visit and that the visit is being conducted through Telephone.  My office door was closed. No one else was in the room.  He acknowledged consent and understanding of privacy and security of the video platform. The patient has agreed to participate and  understands they can discontinue the visit at any time.    Patient is aware this is a billable service.     Subjective  Sushil Harrison is a 68 y.o. male      Patient's cardiologist did blood work patient had some questions.  His sugar was 101.  Bilirubin is 1.07.  Discussed these minor abnormalities.  I do believe there is no clinical significance patient does have a history of hyperglycemia hemoglobin A1c less than 2 months ago was 6.0 is scheduled for full blood work in September.  I recommend low sugar low carbohydrate diet will monitor.         Past Medical History:   Diagnosis Date   • Atrial fibrillation (HCC) 06/26/2015   • Colon polyp    • HLD (hyperlipidemia)    • HTN (hypertension)    • Hypertension    • Multiple thyroid nodules    • Positive FIT (fecal immunochemical test)    • Pre-diabetes    • Vitamin D deficiency        Past Surgical History:   Procedure Laterality Date   • CARDIOVERSION  06/28/2015   • COLONOSCOPY     • US GUIDED THYROID BIOPSY  10/16/2020       Current Outpatient Medications   Medication Sig Dispense Refill   • acyclovir (ZOVIRAX) 800 mg tablet Take 1 tablet (800 mg total) by mouth 2 (two) times a day (Patient taking differently: Take 800 mg by mouth if needed) 120 tablet 0   • amLODIPine (NORVASC) 5 mg tablet Take 1 tablet (5 mg total) by mouth 2 (two) times a day 200 tablet 2   • apixaban (Eliquis) 5 mg Take 1 tablet (5 mg total) by mouth 2 (two) times a day 200 tablet 1   • atorvastatin (LIPITOR) 40 mg tablet Take 1 tablet (40 mg total) by mouth daily 100 tablet 2   • Cholecalciferol (VITAMIN D3) 1000 units CAPS Take 5,000 Units by mouth 3 (three) times a week     • Coenzyme Q10 (CO Q-10) 200 MG CAPS Patient states that he takes this on a daily basis.      • Cyanocobalamin ER 1000 MCG TBCR 1 daily     • cyclobenzaprine (FLEXERIL) 5 mg tablet Take 1 tablet at bedtime as needed for muscle spasm of neck 30 tablet 1   • flecainide (TAMBOCOR) 100 mg tablet take 1 tablet by mouth every  12 hours 180 tablet 3   • flecainide (TAMBOCOR) 50 mg tablet take 1/2 tablet by mouth twice a day IN ADDITION TO 100MG TABLET TWICE A  tablet 3   • fluticasone (FLONASE) 50 mcg/act nasal spray 2 sprays into each nostril daily 9.9 mL 5   • ketoconazole (NIZORAL) 2 % cream Apply topically daily 30 g 1   • lisinopril (ZESTRIL) 20 mg tablet One tablet twice a day 200 tablet 2   • LORazepam (ATIVAN) 1 mg tablet Take 1 tablet (1 mg total) by mouth 2 (two) times a day As needed for anxiety 180 tablet 1   • metoprolol tartrate (LOPRESSOR) 25 mg tablet Take 1 tablet (25 mg total) by mouth 2 (two) times a day 200 tablet 2   • Multiple Vitamins-Minerals (MULTIVITAMIN ADULT PO) take 1 by Oral route once     • selenium 200 mcg take one tablet daily     • tamsulosin (FLOMAX) 0.4 mg Take 1 capsule (0.4 mg total) by mouth daily with dinner 100 capsule 1     No current facility-administered medications for this visit.        Allergies   Allergen Reactions   • No Active Allergies        Review of Systems   Endocrine:        HPI   Genitourinary:         Discussed bilirubin       Video Exam    There were no vitals filed for this visit.    Physical Exam  Constitutional:       Comments: On call, no exam          Visit Time  Total Visit Duration: 15

## 2024-06-05 ENCOUNTER — OFFICE VISIT (OUTPATIENT)
Dept: CARDIOLOGY CLINIC | Facility: CLINIC | Age: 69
End: 2024-06-05
Payer: COMMERCIAL

## 2024-06-05 ENCOUNTER — TELEPHONE (OUTPATIENT)
Dept: CARDIOLOGY CLINIC | Facility: CLINIC | Age: 69
End: 2024-06-05

## 2024-06-05 VITALS
DIASTOLIC BLOOD PRESSURE: 84 MMHG | HEIGHT: 73 IN | HEART RATE: 56 BPM | BODY MASS INDEX: 29.26 KG/M2 | WEIGHT: 220.8 LBS | OXYGEN SATURATION: 97 % | SYSTOLIC BLOOD PRESSURE: 120 MMHG

## 2024-06-05 DIAGNOSIS — I10 ESSENTIAL HYPERTENSION: ICD-10-CM

## 2024-06-05 DIAGNOSIS — J43.2 CENTRILOBULAR EMPHYSEMA (HCC): ICD-10-CM

## 2024-06-05 DIAGNOSIS — Z79.899 LONG TERM CURRENT USE OF ANTIARRHYTHMIC DRUG: ICD-10-CM

## 2024-06-05 DIAGNOSIS — E66.3 OVERWEIGHT (BMI 25.0-29.9): ICD-10-CM

## 2024-06-05 DIAGNOSIS — Z79.01 LONG TERM CURRENT USE OF ANTICOAGULANT THERAPY: ICD-10-CM

## 2024-06-05 DIAGNOSIS — F17.210 TOBACCO DEPENDENCE DUE TO CIGARETTES: ICD-10-CM

## 2024-06-05 DIAGNOSIS — I48.0 PAROXYSMAL ATRIAL FIBRILLATION (HCC): Primary | ICD-10-CM

## 2024-06-05 DIAGNOSIS — R93.1 ELEVATED CORONARY ARTERY CALCIUM SCORE: ICD-10-CM

## 2024-06-05 PROCEDURE — 99214 OFFICE O/P EST MOD 30 MIN: CPT | Performed by: INTERNAL MEDICINE

## 2024-06-05 PROCEDURE — 93000 ELECTROCARDIOGRAM COMPLETE: CPT | Performed by: INTERNAL MEDICINE

## 2024-06-05 NOTE — PROGRESS NOTES
[facility]  [facility]  Primary Office: [address] [place] [id number]  Phone: [phone]; Fax: [phone]  *-*-*-*-*-*-*-*-*-*-*-*-*-*-*-*-*-*-*-*-*-*-*-*-*-*-*-*-*-*-*-*-*-*-*-*-*-*-*-*-*-*-*-*-*-*-*-*-*-*-*-*-*-*  ENCOUNTER DATE: 06/05/24 8:49 AM  PATIENT NAME: Sushil Harrison   1955    [id number]  AGE:68 y.o.      SEX: male  ENCOUNTER PROVIDER: Carlita Dietz MD, A, [facility]  PRIMARY CARE PHYSICIAN: Herminio Luna DO    DIAGNOSES:  1. Paroxysmal atrial fibrillation status post cardioversion in 2015   2. Mixed dyslipidemia  3. Benign essential hypertension  4. Chronic tobacco use  5. Suspected sleep apnea  6. Diverticular disease without diverticulitis  7. Adrenal adenoma  8. Multiple thyroid nodules    CORONARY ARTERY CALCIUM SCORING 2/6/2023:  Left main coronary artery:  0  Left anterior descending coronary artery and diagonal branches:  15  Left circumflex coronary artery and left marginal branches:  0  Right coronary artery and right marginal branches:  532m  Posterior descending coronary artery: 0  TOTAL coronary calcium score:  547    REGADENOSON NUCLEAR STRESS TEST 3/20/2023:   ·  Stress ECG: The stress ECG is negative for ischemia after pharmacologic vasodilation, without reproduction of symptoms.  ·  Perfusion: There is a left ventricular perfusion defect with mild reduction in uptake present in the entire inferior location(s) that is paradoxical.  ·  Stress Function: Left ventricular function post-stress is normal. Post-stress ejection fraction is 57 %.  ·  Stress Combined Conclusion: The ECG and SPECT imaging portions of the stress study are concordant with no evidence of stress induced myocardial ischemia. There is image artifact, without diagnostic evidence for perfusion abnormality.    ECHOCARDIOGRAM 12/30/2022:  Mildly dilated left ventricle cavity, normal wall thickness, normal left ventricle systolic function, normal left ventricle  diastolic function, EF 55%, not mildly dilated right ventricle, normal right ventricle systolic function, mild left atrial enlargement, normal right atrial size, normal aortic valve without stenosis or regurgitation, normal mitral valve without stenosis or regurgitation, trace tricuspid valve regurgitation, mild pulmonic valve regurgitation, no pericardial effusion.  Mild ectasia of ascending aorta measuring up to 3.9 cm.    PERSANTINE NUCLEAR STRESS TEST in July 2015 showed normal perfusion and normal left ventricular systolic function with mildly dilated left ventricular cavity, EF was 58%.    ECHOCARDIOGRAM June 2018:  - Mild left ventricular cavity enlargement, normal left  ventricle systolic function and normal diastolic function. EF  approximately 50-55%.  - Mild right atrial and mild to moderate left atrial  enlargement.  - Aortic valve leaflet sclerosis. No stenosis or regurgitation.  - Mitral valve leaflet sclerosis. Trace mitral valve  regurgitation.  - Mild tricuspid and pulmonic valve regurgitation.  - No pericardial effusion.  - No pulmonary hypertension.     TRANSESOPHAGEAL ECHOCARDIOGRAM in June 2015 showed mild-to-moderate left atrial enlargement, borderline reduced left ventricular systolic function.       CURRENT ECG     Results for orders placed or performed in visit on 06/05/24   POCT ECG    Narrative    Sinus bradycardia with sinus arrhythmia, HR 56 bpm, normal axis, normal intervals QRS duration 100 ms, slightly delayed R wave transition, no definite evidence of prior infarction, no ischemia.  No significant chamber hypertrophy or enlargement.        CARDIOLOGY ASSESSMENT & PLAN      Diagnosis ICD-10-CM Associated Orders   1. Paroxysmal atrial fibrillation (HCC)  I48.0 POCT ECG      2. Essential hypertension  I10       3. Centrilobular emphysema (HCC)  J43.2       4. Tobacco dependence due to cigarettes  F17.210       5. Overweight (BMI 25.0-29.9)  E66.3       6. Elevated coronary artery  calcium score  R93.1       7. Long term current use of antiarrhythmic drug  Z79.899       8. Long term current use of anticoagulant therapy  Z79.01          1. Paroxysmal atrial fibrillation (HCC)  Assessment & Plan:  Omeprazole normal and Mr. Sushil Harrison is overall doing well from cardiac perspective with no recent recurrence of atrial fibrillation symptoms.  Unfortunately continues to smoke which is a single major risk factor for future cardiovascular events.  He also has elevated coronary artery calcium score predominantly in the right coronary artery.  He did undergo stress test in  March 2023 and it was normal.  He is on flecainide therapy and on anticoagulation with Eliquis.  His renal function is normal.  ECG shows sinus bradycardia with QRS duration of 100 ms.  Physical examination today is overall unremarkable.  Recent blood work indicates well-controlled lipids and stable electrolytes and thyroid function.    -- I am advising him to continue current medications.  -- Again stressed with him the importance of quitting smoking especially given his family history of CAD.  -- Advising to have follow-up with primary care physician every 6 months.  Cardiology appointment can be made for 1 year.  -- Should do ECG with next visit.  -- Dietary and medical compliance are reinforced.  -- He is advised  to report any concerning symptoms such as chest pain, shortness of breath, decline in exercise tolerance or presyncope/syncope.  Orders:  -     POCT ECG  2. Essential hypertension  3. Centrilobular emphysema (HCC)  4. Tobacco dependence due to cigarettes  5. Overweight (BMI 25.0-29.9)  6. Elevated coronary artery calcium score  7. Long term current use of antiarrhythmic drug  8. Long term current use of anticoagulant therapy        INTERVAL HISTORY, HISTORY OF PRESENT ILLNESS & COMPREHENSIVE VISIT INFORMATION     Sushil Harrison is here for follow-up regarding his cardiac comorbidities which include: Paroxysmal atrial  fibrillation, hypertension, dyslipidemia and comorbidities.  Was last seen by me in  October 2023.  He is here for follow-up today.  He mentions that since last visit he has had no new major diagnosis or hospitalizations or significant illnesses.  He has been having some pain in the neck and has had testing done which revealed retrolisthesis of the disc in the cervical spine.  He did go for physical therapy but it was not helping but he is doing some exercises at home which seem to help.  In addition to that he has also been experiencing some symptoms of GERD.  He took omeprazole with some relief with the symptoms.  From a cardiac perspective he denies any chest pains unusual shortness of breath palpitations passing out or near passing out experience orthopnea or PND or pedal edema.  He continues to use flecainide 125 mg twice daily.  Along with lisinopril and amlodipine and metoprolol tartrate and coenzyme Q 10.  He is on anticoagulation with Eliquis.  Denies any excessive bruising or bleeding.    Functional capacity status:  Good   (Excellent- >10 METs; Good: (7-10 METs); Moderate (4-7 METs); Poor (<= 4 METs)    Any chronic stressors:  None   (feeling of poor health, financial problems, and social isolation etc).    Tobacco or alcohol dependence:  He does not drink but he smokes about 8-10 cigarettes a day.    Current cardiac medications:  Amlodipine 5 mg twice daily, lisinopril 20 mg daily, atorvastatin 40 mg daily, apixaban 5 mg twice daily, flecainide 125 mg twice daily, metoprolol tartrate 25 mg twice daily, coenzyme Q 10.    Last recent comprehensive blood work available:   Blood work 5/22/2024:  Sodium 140 potassium 4.1.15 bicarb 25 BUN 15 creatinine 0.61   Normal LFTs.  Bilirubin borderline increased at 1.07    Total cholesterol 104 triglyceride 54 HDL 27 calculated LDL 66  Normal CBC  Hemoglobin A1c 6.0 on 3/5/2024  TSH 0.822 Free T4 1.07    Major cardiac risk factors: Tobacco dependence,  "hypertension (Tobacco use, Hypertension, Family history of CHD, Primary severe hypercholesterolemia, DM, multiple major and risk enhancing factors)     Any cardiac clinical risk enhancers from available data: Paroxysmal atrial fibrillation, increased BMI (Family history of premature CAD, patient's history of chronic kidney disease, primary hypercholesterolemia, metabolic syndrome, abnormal NIA, inflammatory condition such as RA-HIV-psoriasis, RA-HIV-Psoriasis,, pregnancy related complications such as preeclampsia or premature delivery, early menopause, high risk ethnicity such as [place] , social deprivation, physical inactivity, nonalcoholic fatty liver disease psychosocial stress, major psychiatric illness, atrial fibrillation, left ventricular hypertrophy, obstructive sleep apnea, nonalcoholic fatty liver disease).    REVIEW OF SYSTEMS   Positive for: As noted above in HPI  Negative for: All remaining as reviewed below and in HPI.    SYSTEM SYMPTOMS REVIEWED:  General--weight change, fever, night sweats  Respiratory--cough, wheezing, shortness of breath, sputum production  Cardiovascular--chest pain, syncope, dyspnea on exertion, edema, decline in exercise tolerance, claudication   Gastrointestinal--persistent vomiting, diarrhea, abdominal distention, blood in stool   Muscular or skeletal--joint pain or swelling   Neurologic--headaches, syncope, abnormal movement  Hematologic--history of easy bruising and bleeding   Endocrine--thyroid enlargement, heat or cold intolerance, polyuria   Psychiatric--anxiety, depression     *-*-*-*-*-*-*-*-*-*-*-*-*-*-*-*-*-*-*-*-*-*-*-*-*-*-*-*-*-*-*-*-*-*-*-*-*-*-*-*-*-*-*-*-*-*-*-*-*-*-*-*-*-*-  VITAL SIGNS     CURRENT VITAL SIGNS:   Vitals:    06/05/24 0815   BP: 120/84   BP Location: Left arm   Patient Position: Sitting   Cuff Size: Large   Pulse: 56   SpO2: 97%   Weight: 100 kg (220 lb 12.8 oz)   Height: 6' 1\" (1.854 m)       BMI: Body mass index is 29.13 " kg/m².    WEIGHTS:   Wt Readings from Last 25 Encounters:   06/05/24 100 kg (220 lb 12.8 oz)   03/11/24 103 kg (226 lb)   01/30/24 103 kg (228 lb)   11/02/23 99.8 kg (220 lb)   10/12/23 99.3 kg (219 lb)   09/11/23 97 kg (213 lb 12.8 oz)   09/08/23 97.1 kg (214 lb)   06/19/23 98.1 kg (216 lb 3.2 oz)   05/05/23 102 kg (224 lb)   04/07/23 101 kg (223 lb)   03/20/23 101 kg (223 lb)   03/13/23 101 kg (223 lb)   03/06/23 101 kg (223 lb)   01/18/23 99.5 kg (219 lb 6.4 oz)   01/05/23 102 kg (225 lb)   12/30/22 98 kg (216 lb)   10/26/22 98.3 kg (216 lb 12.8 oz)   08/31/22 98.4 kg (217 lb)   11/23/21 102 kg (225 lb)   08/26/21 101 kg (222 lb)   11/19/20 98 kg (216 lb)   11/17/20 98.9 kg (218 lb)   10/15/20 98.9 kg (218 lb)   08/12/20 97.8 kg (215 lb 9.6 oz)   11/08/19 102 kg (225 lb)        *-*-*-*-*-*-*-*-*-*-*-*-*-*-*-*-*-*-*-*-*-*-*-*-*-*-*-*-*-*-*-*-*-*-*-*-*-*-*-*-*-*-*-*-*-*-*-*-*-*-*-*-*-*-  PHYSICAL EXAM     General Appearance:    Alert, cooperative, no distress, appears stated age   Head, Eyes, ENT:    No obvious abnormality, moist mucous mebranes.   Neck:   Supple, no carotid bruit. No JVD, no obvious lymphadenoapthy   Back:     Symmetric, no curvature.   Lungs:     Respirations unlabored. Clear to auscultation bilaterally,    Chest wall:    No tenderness or deformity   Heart:    Regular rate and rhythm, S1 and S2 normal, no murmur, rub  or gallop.   Abdomen:     Soft, non-tender.   Extremities:   Extremities warm, no cyanosis or edema    Skin:   No venostatic changes in lower extremities.   Normal skin color, texture, and turgor. No rashes or lesions   Neuro: Pt is alert and oriented time 3 with no gross motor deficits.   Psych/ behavioral: Mood is normal. Behavior is normal.     *-*-*-*-*-*-*-*-*-*-*-*-*-*-*-*-*-*-*-*-*-*-*-*-*-*-*-*-*-*-*-*-*-*-*-*-*-*-*-*-*-*-*-*-*-*-*-*-*-*-*-*-*-*-  CURRENT MEDICATIONS LIST     Current Outpatient Medications:     amLODIPine (NORVASC) 5 mg tablet, Take 1 tablet (5 mg total) by  mouth 2 (two) times a day, Disp: 200 tablet, Rfl: 2    apixaban (Eliquis) 5 mg, Take 1 tablet (5 mg total) by mouth 2 (two) times a day, Disp: 200 tablet, Rfl: 1    atorvastatin (LIPITOR) 40 mg tablet, Take 1 tablet (40 mg total) by mouth daily, Disp: 100 tablet, Rfl: 2    Cholecalciferol (VITAMIN D3) 1000 units CAPS, Take 5,000 Units by mouth 3 (three) times a week, Disp: , Rfl:     Coenzyme Q10 (CO Q-10) 200 MG CAPS, Patient states that he takes this on a daily basis. , Disp: , Rfl:     Cyanocobalamin ER 1000 MCG TBCR, 1 daily, Disp: , Rfl:     cyclobenzaprine (FLEXERIL) 5 mg tablet, Take 1 tablet at bedtime as needed for muscle spasm of neck, Disp: 30 tablet, Rfl: 1    flecainide (TAMBOCOR) 100 mg tablet, take 1 tablet by mouth every 12 hours, Disp: 180 tablet, Rfl: 3    flecainide (TAMBOCOR) 50 mg tablet, take 1/2 tablet by mouth twice a day IN ADDITION TO 100MG TABLET TWICE A DAY, Disp: 180 tablet, Rfl: 3    fluticasone (FLONASE) 50 mcg/act nasal spray, 2 sprays into each nostril daily, Disp: 9.9 mL, Rfl: 5    ketoconazole (NIZORAL) 2 % cream, Apply topically daily, Disp: 30 g, Rfl: 1    lisinopril (ZESTRIL) 20 mg tablet, One tablet twice a day, Disp: 200 tablet, Rfl: 2    LORazepam (ATIVAN) 1 mg tablet, Take 1 tablet (1 mg total) by mouth 2 (two) times a day As needed for anxiety, Disp: 180 tablet, Rfl: 1    metoprolol tartrate (LOPRESSOR) 25 mg tablet, Take 1 tablet (25 mg total) by mouth 2 (two) times a day, Disp: 200 tablet, Rfl: 2    Multiple Vitamins-Minerals (MULTIVITAMIN ADULT PO), take 1 by Oral route once, Disp: , Rfl:     selenium 200 mcg, take one tablet daily, Disp: , Rfl:     tamsulosin (FLOMAX) 0.4 mg, Take 1 capsule (0.4 mg total) by mouth daily with dinner, Disp: 100 capsule, Rfl: 1    acyclovir (ZOVIRAX) 800 mg tablet, Take 1 tablet (800 mg total) by mouth 2 (two) times a day (Patient taking differently: Take 800 mg by mouth if needed), Disp: 120 tablet, Rfl: 0       ALLERGIES     Allergies    Allergen Reactions    No Active Allergies        *-*-*-*-*-*-*-*-*-*-*-*-*-*-*-*-*-*-*-*-*-*-*-*-*-*-*-*-*-*-*-*-*-*-*-*-*-*-*-*-*-*-*-*-*-*-*-*-*-*-  SIGNATURES:   @SIG@   [facility] MD J Luis; MHA    *-*-*-*-*-*-*-*-*-*-*-*-*-*-*-*-*-*-*-*-*-*-*-*-*-*-*-*-*-*-*-*-*-*-*-*-*-*-*-*-*-*-*-*-*-*-*-*-*-*-*-*-*-*-  PAST MEDICAL HISTORY IN:  Past Medical History:   Diagnosis Date    Atrial fibrillation (HCC) 06/26/2015    Colon polyp     HLD (hyperlipidemia)     HTN (hypertension)     Hypertension     Multiple thyroid nodules     Positive FIT (fecal immunochemical test)     Pre-diabetes     Vitamin D deficiency     PAST SURGICAL HISTORY:  Past Surgical History:   Procedure Laterality Date    CARDIOVERSION  06/28/2015    COLONOSCOPY      US GUIDED THYROID BIOPSY  10/16/2020         FAMILY HISTORY:  Family History   Problem Relation Age of Onset    Heart attack Father 60        MI    Heart attack Paternal Uncle         MI    SOCIAL HISTORY:  Social History     Tobacco Use   Smoking Status Every Day    Current packs/day: 0.50    Average packs/day: 0.5 packs/day for 56.4 years (28.2 ttl pk-yrs)    Types: Cigarettes    Start date: 1968   Smokeless Tobacco Never   Tobacco Comments    Patient smokes half pack a day now, however he used to smoke a pack a day earlier      Social History     Substance and Sexual Activity   Alcohol Use Not Currently     Social History     Substance and Sexual Activity   Drug Use Never    [unfilled]       *-*-*-*-*-*-*-*-*-*-*-*-*-*-*-*-*-*-*-*-*-*-*-*-*-*-*-*-*-*-*-*-*-*-*-*-*-*-*-*-*-*-*-*-*-*-*-*-*-*-*-*-*-*       Shortness of breath    Shortness of breath means you have trouble breathing and could indicate a medical problem. Causes include lung diseases, heart disease, low amount of red blood cells (anemia), poor physical fitness, being overweight, smoking, etc. Your health care provider may not be able to find a cause for your shortness of breath after your exam. In this case, it is important to have a follow-up exam with your primary care physician as instructed. If medicines were prescribed, take them as directed for the full length of time directed. Refrain from tobacco products.    SEEK IMMEDIATE MEDICAL CARE IF YOU HAVE THE FOLLOWING SYMPTOMS: worsening shortness of breath, chest pain, back pain, abdominal pain, fever, coughing up blood, lightheadedness/dizziness.    Our Emergency Department Referral Coordinators will be reaching out to you in the next 24-48 hours from 9:00am to 5:00pm with a follow up appointment. Please expect a phone call from the hospital in that time frame. If you do not receive a call or if you have any questions or concerns, you can reach them at   [phone] 620[facility]

## 2024-06-05 NOTE — PATIENT INSTRUCTIONS
CARDIOLOGY ASSESSMENT & PLAN      Diagnosis ICD-10-CM Associated Orders   1. Paroxysmal atrial fibrillation (HCC)  I48.0 POCT ECG      2. Essential hypertension  I10       3. Centrilobular emphysema (HCC)  J43.2       4. Tobacco dependence due to cigarettes  F17.210       5. Overweight (BMI 25.0-29.9)  E66.3       6. Elevated coronary artery calcium score  R93.1       7. Long term current use of antiarrhythmic drug  Z79.899       8. Long term current use of anticoagulant therapy  Z79.01          1. Paroxysmal atrial fibrillation (HCC)  Assessment & Plan:  Omeprazole normal and Mr. Sushil Harrison is overall doing well from cardiac perspective with no recent recurrence of atrial fibrillation symptoms.  Unfortunately continues to smoke which is a single major risk factor for future cardiovascular events.  He also has elevated coronary artery calcium score predominantly in the right coronary artery.  He did undergo stress test in  March 2023 and it was normal.  He is on flecainide therapy and on anticoagulation with Eliquis.  His renal function is normal.  ECG shows sinus bradycardia with QRS duration of 100 ms.  Physical examination today is overall unremarkable.  Recent blood work indicates well-controlled lipids and stable electrolytes and thyroid function.    -- I am advising him to continue current medications.  -- Again stressed with him the importance of quitting smoking especially given his family history of CAD.  -- Advising to have follow-up with primary care physician every 6 months.  Cardiology appointment can be made for 1 year.  -- Should do ECG with next visit.  -- Dietary and medical compliance are reinforced.  -- He is advised  to report any concerning symptoms such as chest pain, shortness of breath, decline in exercise tolerance or presyncope/syncope.  Orders:  -     POCT ECG  2. Essential hypertension  3. Centrilobular emphysema (HCC)  4. Tobacco dependence due to cigarettes  5. Overweight (BMI  25.0-29.9)  6. Elevated coronary artery calcium score  7. Long term current use of antiarrhythmic drug  8. Long term current use of anticoagulant therapy

## 2024-06-05 NOTE — TELEPHONE ENCOUNTER
Samples of Eliquis 5mg per Dr. Dietz.     LOT: [id number]  EXP: Sept 2025  Dispensed: 2 boxes    LOT: [id number]  EXP: Oct 2025  Dispensed: 2 boxes

## 2024-06-05 NOTE — ASSESSMENT & PLAN NOTE
Omeprazole normal and Mr. Sushil Harrison is overall doing well from cardiac perspective with no recent recurrence of atrial fibrillation symptoms.  Unfortunately continues to smoke which is a single major risk factor for future cardiovascular events.  He also has elevated coronary artery calcium score predominantly in the right coronary artery.  He did undergo stress test in  March 2023 and it was normal.  He is on flecainide therapy and on anticoagulation with Eliquis.  His renal function is normal.  ECG shows sinus bradycardia with QRS duration of 100 ms.  Physical examination today is overall unremarkable.  Recent blood work indicates well-controlled lipids and stable electrolytes and thyroid function.    -- I am advising him to continue current medications.  -- Again stressed with him the importance of quitting smoking especially given his family history of CAD.  -- Advising to have follow-up with primary care physician every 6 months.  Cardiology appointment can be made for 1 year.  -- Should do ECG with next visit.  -- Dietary and medical compliance are reinforced.  -- He is advised  to report any concerning symptoms such as chest pain, shortness of breath, decline in exercise tolerance or presyncope/syncope.

## 2024-06-20 ENCOUNTER — OFFICE VISIT (OUTPATIENT)
Dept: FAMILY MEDICINE CLINIC | Facility: CLINIC | Age: 69
End: 2024-06-20
Payer: COMMERCIAL

## 2024-06-20 ENCOUNTER — TELEPHONE (OUTPATIENT)
Dept: CARDIOLOGY CLINIC | Facility: CLINIC | Age: 69
End: 2024-06-20

## 2024-06-20 ENCOUNTER — TELEPHONE (OUTPATIENT)
Age: 69
End: 2024-06-20

## 2024-06-20 VITALS
BODY MASS INDEX: 29.16 KG/M2 | WEIGHT: 220 LBS | DIASTOLIC BLOOD PRESSURE: 68 MMHG | HEART RATE: 82 BPM | TEMPERATURE: 98.4 F | HEIGHT: 73 IN | SYSTOLIC BLOOD PRESSURE: 112 MMHG | OXYGEN SATURATION: 95 %

## 2024-06-20 DIAGNOSIS — U07.1 COVID-19 VIRUS INFECTION: Primary | ICD-10-CM

## 2024-06-20 DIAGNOSIS — R50.9 FEVER, UNSPECIFIED FEVER CAUSE: ICD-10-CM

## 2024-06-20 DIAGNOSIS — J43.2 CENTRILOBULAR EMPHYSEMA (HCC): ICD-10-CM

## 2024-06-20 DIAGNOSIS — I10 ESSENTIAL HYPERTENSION: ICD-10-CM

## 2024-06-20 DIAGNOSIS — E66.3 OVERWEIGHT (BMI 25.0-29.9): ICD-10-CM

## 2024-06-20 DIAGNOSIS — F17.210 TOBACCO DEPENDENCE DUE TO CIGARETTES: ICD-10-CM

## 2024-06-20 DIAGNOSIS — Z20.822 CLOSE EXPOSURE TO COVID-19 VIRUS: ICD-10-CM

## 2024-06-20 DIAGNOSIS — I48.0 PAROXYSMAL ATRIAL FIBRILLATION (HCC): ICD-10-CM

## 2024-06-20 DIAGNOSIS — R93.1 ELEVATED CORONARY ARTERY CALCIUM SCORE: ICD-10-CM

## 2024-06-20 LAB
SARS-COV-2 AG UPPER RESP QL IA: NEGATIVE
VALID CONTROL: NORMAL

## 2024-06-20 PROCEDURE — G2211 COMPLEX E/M VISIT ADD ON: HCPCS | Performed by: FAMILY MEDICINE

## 2024-06-20 PROCEDURE — 87811 SARS-COV-2 COVID19 W/OPTIC: CPT | Performed by: FAMILY MEDICINE

## 2024-06-20 PROCEDURE — 99214 OFFICE O/P EST MOD 30 MIN: CPT | Performed by: FAMILY MEDICINE

## 2024-06-20 RX ORDER — SODIUM FLUORIDE1.1%, POTASSIUM NITRATE 5% 5.8; 57.5 MG/ML; MG/ML
GEL, DENTIFRICE DENTAL
COMMUNITY
Start: 2024-06-09

## 2024-06-20 RX ORDER — LEVALBUTEROL TARTRATE 45 UG/1
1-2 AEROSOL, METERED ORAL EVERY 4 HOURS PRN
Qty: 15 G | Refills: 0 | Status: SHIPPED | OUTPATIENT
Start: 2024-06-20

## 2024-06-20 RX ORDER — MOLNUPIRAVIR 200 MG/1
800 CAPSULE ORAL EVERY 12 HOURS
Qty: 40 CAPSULE | Refills: 0 | Status: CANCELLED | OUTPATIENT
Start: 2024-06-20 | End: 2024-06-25

## 2024-06-20 NOTE — TELEPHONE ENCOUNTER
Patient called and stated he was told by Dr Thornton that if he needed medication called in to let him know . Patient is stating he called some pharmacies to see if they have it in case he does need it and he's having trouble locating it . Patient asked if Dr Thornton knows of any place that has it . Please advise .

## 2024-06-20 NOTE — TELEPHONE ENCOUNTER
Patient notified. He said he called [facility] in [place] and they have it.  He will test tomorrow and let you know.  He did ask if you can send it anyway in case he tests positive over the weekend?

## 2024-06-20 NOTE — PATIENT INSTRUCTIONS
Fact Sheet for Patients And Caregivers   Emergency Use Authorization (EUA) Of Molnupiravir For Coronavirus Disease 2019 (COVID-19)   What is the most important information I should know about molnupiravir?   Molnupiravir may cause serious side effects, including:    Molnupiravir may cause harm to your unborn baby. It is not known if molnupiravir will harm your baby if you take molnupiravir during pregnancy.   o Molnupiravir is not recommended for use in pregnancy.   o Molnupiravir has not been studied in pregnancy. Molnupiravir was studied in pregnant animals only. When molnupiravir was given to pregnant animals, molnupiravir caused harm to their unborn babies.   o You and your healthcare provider may decide that you should take molnupiravir during pregnancy if there are no other COVID-19 treatment options authorized by the FDA that are accessible or clinically appropriate for you.   o If you and your healthcare provider decide that you should take molnupiravir during pregnancy, you and your healthcare provider should discuss the known and potential benefits and the potential risks of taking molnupiravir during pregnancy.     For individuals who are able to become pregnant:    You should use a reliable method of birth control (contraception) consistently and correctly during treatment with molnupiravir and for 4 days after the last dose of molnupiravir. Talk to your healthcare provider about reliable birth control methods.    Before starting treatment with molnupiravir your healthcare provider may do a pregnancy test to see if you are pregnant before starting treatment with molnupiravir.    Tell your healthcare provider right away if you become pregnant or think you may be pregnant during treatment with molnupiravir.     Pregnancy Surveillance Program:    There is a pregnancy surveillance program for individuals who take molnupiravir during pregnancy. The purpose of this program is to collect information about  the health of you and your baby. Talk to your healthcare provider about how to take part in this program.    If you take molnupiravir during pregnancy and you agree to participate in the pregnancy surveillance program and allow your healthcare provider to share your information with [employer], then your healthcare provider will report your use of molnupiravir during pregnancy to [employer]. by calling [phone] or pregnancyreporting.[employer].     For individuals who are sexually active with partners who are able to become pregnant:    It is not known if molnupiravir can affect sperm. While the risk is regarded as low, animal studies to fully assess the potential for molnupiravir to affect the babies of males treated with molnupiravir have not been completed. A reliable method of birth control (contraception) should be used consistently and correctly during treatment with molnupiravir and for at least 3 months after the last dose. The risk to sperm beyond 3 months is not known. Studies to understand the risk to sperm beyond 3 months are ongoing. Talk to your healthcare provider       about reliable birth control methods. Talk to your healthcare provider if you have questions or concerns about how molnupiravir may affect sperm.     You are being given this fact sheet because your healthcare provider believes it is necessary to provide you with molnupiravir for the treatment of adults with mild-to-moderate coronavirus disease 2019 (COVID-19) with positive results of direct SARS-CoV-2 viral testing, and   who are at high risk for progressing to severe COVID-19 including hospitalization or death, and   for whom other COVID-19 treatment options authorized by the FDA are not accessible or clinically appropriate.   The U.S. Food and Drug Administration (FDA) has issued an Emergency Use Authorization (EUA) to make molnupiravir available during the COVID-19 pandemic (for more details about an EUA  "please see \"What is an Emergency Use Authorization?\" at the end of this document). Molnupiravir is not an FDA-approved medicine in the [place]. Read this Fact Sheet for information about molnupiravir. Talk to your healthcare provider about your options if you have any questions. It is your choice to take molnupiravir.   What is COVID-19?   COVID-19 is caused by a virus called a coronavirus. You can get COVID-19 through close contact with another person who has the virus.   COVID-19 illnesses have ranged from very mild-to-severe, including illness resulting in death. While information so far suggests that most COVID-19 illness is mild, serious illness can happen and may cause some of your other medical conditions to become worse. Older people and people of all ages with severe, long lasting (chronic) medical conditions like heart disease, lung disease and diabetes, for example seem to be at higher risk of being hospitalized for COVID-19.   What is molnupiravir?   Molnupiravir is an investigational medicine used to treat mild-to-moderate COVID-19 in adults:    with positive results of direct SARS-CoV-2 viral testing, and    who are at high risk for progressing to severe COVID-19 including hospitalization or death, and for whom other COVID-19 treatment options authorized by the FDA are not accessible or clinically appropriate.     The FDA has authorized the emergency use of molnupiravir for the treatment of mild-to-moderate COVID-19 in adults under an EUA. For more information on EUA, see the \"What is an Emergency Use Authorization (EUA)?\" section at the end of this Fact Sheet.   Molnupiravir is not authorized:    for use in people less than 18 years of age.    for prevention of COVID-19.    for people needing hospitalization for COVID-19.    for use for longer than 5 consecutive days.     What should I tell my healthcare provider before I take molnupiravir?   Tell your healthcare provider if you:    Have any " "allergies    Are breastfeeding or plan to breastfeed    Have any serious illnesses    Are taking any medicines (prescription, over-the-counter, vitamins, or herbal products).     How do I take molnupiravir?    Take molnupiravir exactly as your healthcare provider tells you to take it.    Take 4 capsules of molnupiravir every 12 hours (for example, at 8 am and at 8 pm)    Take molnupiravir for 5 days. It is important that you complete the full 5 days of treatment with molnupiravir. Do not stop taking molnupiravir before you complete the full 5 days of treatment, even if you feel better.    Take molnupiravir with or without food.    You should stay in isolation for as long as your healthcare provider tells you to. Talk to your healthcare provider if you are not sure about how to properly isolate while you have COVID-19.    Swallow molnupiravir capsules whole. Do not open, break, or crush the capsules. If you cannot swallow capsules whole, tell your healthcare provider.    What to do if you miss a dose: o If it has been less than 10 hours since the missed dose, take it as soon as you remember   o If it has been more than 10 hours since the missed dose, skip the missed dose and take your dose at the next scheduled time.      Do not double the dose of molnupiravir to make up for a missed dose.     What are the important possible side effects of molnupiravir?   Possible side effects of molnupiravir are:    See, \"What is the most important information I should know about molnupiravir?\"    diarrhea    nausea    dizziness     These are not all the possible side effects of molnupiravir. Not many people have taken molnupiravir. Serious and unexpected side effects may happen. This medicine is still being studied, so it is possible that all of the risks are not known at this time.   What other treatment choices are there?   Like molnupiravir, FDA may allow for the emergency use of other medicines to treat people with COVID-19. " Go to https://www.fda.gov/emergency-preparedness-and-response/mcm-legal-regulatory-and-policy-framework/emergency-use-authorization for more information.   It is your choice to be treated or not to be treated with molnupiravir. Should you decide not to take it, it will not change your standard medical care.   What if I am breastfeeding?   Breastfeeding is not recommended during treatment with molnupiravir and for 4 days after the last dose of molnupiravir. If you are breastfeeding or plan to breastfeed, talk to your healthcare provider about your options and specific situation before taking molnupiravir.   How do I report side effects with molnupiravir?   Contact your healthcare provider if you have any side effects that bother you or do not go away.   Report side effects to FDA MedWatch at www.fda.gov/medwatch or call 2-851-YZZ-0605 (1- [phone]).   How should I store molnupiravir?    Store molnupiravir capsules at room temperature between 68°F to 77°F (20°C to 25°C).    Keep molnupiravir and all medicines out of the reach of children and pets.     How can I learn more about COVID-19?    Ask your healthcare provider.    Visit www.cdc.gov/COVID19    Contact your local or state public health department.    Call [employer] at [phone] (toll free in the [place]    Visit [email or web address]     What Is an Emergency Use Authorization (EUA)?   The [place] FDA has made molnupiravir available under an emergency access mechanism called an Emergency Use Authorization (EUA) The EUA is supported by a [place] of Health and Human Service (HHS) declaration that circumstances exist to justify emergency use of drugs and biological products during the COVID-19 pandemic.   Molnupiravir for the treatment of mild-to-moderate COVID-19 in adults with positive results of direct SARS-CoV-2 viral testing, who are at high risk for progression to severe COVID-19, including hospitalization or death, and for whom  alternative COVID-19 treatment options authorized by FDA are not accessible or clinically appropriate, has not undergone the same type of review as an FDA-approved product. In issuing an EUA under the COVID-19 public health emergency, the FDA has determined, among other things, that based on the total amount of scientific evidence available including data from adequate and well-controlled clinical trials, if available, it is reasonable to believe that the product may be effective for diagnosing, treating, or preventing COVID-19, or a serious or life-threatening disease or condition caused by COVID- 19; that the known and potential benefits of the product, when used to diagnose, treat, or prevent such disease or condition, outweigh the known and potential risks of such product; and that there are no adequate, approved, and available alternatives.   All of these criteria must be met to allow for the product to be used in the treatment of patients during the COVID-19 pandemic. The EUA for molnupiravir is in effect for the duration of the COVID-19 declaration justifying emergency use of molnupiravir, unless terminated or revoked (after which molnupiravir may no longer be used under the EUA).   For patent information: www.[employer]/research/patent   Copyright © 2021 [employer] [place], [place] and its affiliates.   All rights reserved.   [id number]   Issued: 12/23/2021     1. COVID-19 virus infection  Assessment & Plan:  Patient with typical COVID-19 symptoms, therefore I do feel that he has COVID-19.  Will treat symptomatically with Tylenol, respiratory treatments.  Currently, the patient does not have severe symptoms, but definitely has symptoms consistent with COVID.  Since he started symptoms today, it is likely that the test was just a little early.  He can test later on on Saturday, or Sunday, and if he comes back positive for that, would recommend that he contact the office and consider  treatment with Molnupiravir, as he is not able to take Paxlovid due to medication interactions with Eliquis and Tambocor.  2. Centrilobular emphysema (HCC)  Assessment & Plan:  Lungs clear today.  Does not have any inhalers.  Would send Xopenex to the pharmacy that he can  if he is having increasing shortness of breath or wheezing.    3. Essential hypertension  Assessment & Plan:  Blood pressure is doing quite well today.  No change.  4. Elevated coronary artery calcium score  Assessment & Plan:  Patient does have elevated coronary calcium score.  Follow-up with Dr. Luna for routine treatment.  This does mean there is a slight increased risk of coronary artery disease issues, which again goes towards earlier treatment for COVID if there is increased symptoms.  5. Overweight (BMI 25.0-29.9)  Assessment & Plan:  Patient's BMI is somewhat elevated, but not to the point of obesity.  Still, with increased BMI he does qualify for treatment for COVID as an increased risk factor.  6. Paroxysmal atrial fibrillation (HCC)  Assessment & Plan:  Stable at the moment.  Continues on Tambocor, Eliquis.  Should not stop these.  Cannot take Paxlovid based on this.  7. Fever, unspecified fever cause  -     POCT Rapid Covid Ag  8. Close exposure to COVID-19 virus  -     POCT Rapid Covid Ag  9. Tobacco dependence due to cigarettes  Assessment & Plan:  Recommend quit smoking.  Patient reports he is a someday smoker at this point.        COVID 19 Instructions    Sushil Harrison was advised to limit contact with others to essential tasks such as getting food, medications, and medical care.    Proper handwashing reviewed, and Hand sanitzer when washing is not available.    If the patient develops symptoms of COVID 19, the patient should call the office as soon as possible.    It is strongly recommended that Flu Vaccinations be obtained.      Virtual Visits:  Jose: This works on smart phones (any phone with Internet browsing  capability).  You should get a text message when the provider is ready to see you.  Click on the link in the text message, and the call should start.  You will need to type in your name, and allow camera and microphone access.  This is HIPPA compliant, and secure.      If you have not already done so, get immunized to COVID 19.      We are committed to getting you vaccinated as soon as possible and will be closely following [facility] guidelines as they are released and revised.  Please refer to our COVID-19 vaccine webpage for the most up to date information on the vaccine and our distribution efforts.    This site will also have the most up to date recommendations for COVID booster vaccine.    [email or web address]    Call [phone] ([phone]), option 7    You can also visit https://www.vaccines.gov/ to find vaccines in your area.    OUR LOCATION:    [facility]  [address] [place] [address], [address]  [place], [place], [address] [phone]  Fax: [phone]    Lab services, Rheumatology, and OB/GYN are at this location as well.

## 2024-06-20 NOTE — ASSESSMENT & PLAN NOTE
Patient does have elevated coronary calcium score.  Follow-up with Dr. Luna for routine treatment.  This does mean there is a slight increased risk of coronary artery disease issues, which again goes towards earlier treatment for COVID if there is increased symptoms.

## 2024-06-20 NOTE — ASSESSMENT & PLAN NOTE
Patient's BMI is somewhat elevated, but not to the point of obesity.  Still, with increased BMI he does qualify for treatment for COVID as an increased risk factor.

## 2024-06-20 NOTE — TELEPHONE ENCOUNTER
Caller: Jaskaran     Doctor: Dr. Dietz    Reason for call: patient called and advised that he has a 10am appt w/ pcp today and his wife has covid and prescribed her paxlovid. He was wondering if he was allowed to also be on the drug since he is taking flecinide.Tried reaching triage line and was unsuccessful.    Call back#: [phone]

## 2024-06-20 NOTE — ASSESSMENT & PLAN NOTE
Patient with typical COVID-19 symptoms, therefore I do feel that he has COVID-19.  Will treat symptomatically with Tylenol, respiratory treatments.  Currently, the patient does not have severe symptoms, but definitely has symptoms consistent with COVID.  Since he started symptoms today, it is likely that the test was just a little early.  He can test later on on Saturday, or Sunday, and if he comes back positive for that, would recommend that he contact the office and consider treatment with Molnupiravir, as he is not able to take Paxlovid due to medication interactions with Eliquis and Tambocor.

## 2024-06-20 NOTE — PROGRESS NOTES
Ambulatory Visit  Name: Sushil Harrison      : 1955      MRN: [id number]  Encounter Provider: Reji Thornton MD  Encounter Date: 2024   Encounter department: [facility]    Assessment & Plan   1. COVID-19 virus infection  Assessment & Plan:  Patient with typical COVID-19 symptoms, therefore I do feel that he has COVID-19.  Will treat symptomatically with Tylenol, respiratory treatments.  Currently, the patient does not have severe symptoms, but definitely has symptoms consistent with COVID.  Since he started symptoms today, it is likely that the test was just a little early.  He can test later on on Saturday, or , and if he comes back positive for that, would recommend that he contact the office and consider treatment with Molnupiravir, as he is not able to take Paxlovid due to medication interactions with Eliquis and Tambocor.  Orders:  -     levalbuterol (Xopenex HFA) 45 mcg/act inhaler; Inhale 1-2 puffs every 4 (four) hours as needed for wheezing  2. Centrilobular emphysema (HCC)  Assessment & Plan:  Lungs clear today.  Does not have any inhalers.  Would send Xopenex to the pharmacy that he can  if he is having increasing shortness of breath or wheezing.  Orders:  -     levalbuterol (Xopenex HFA) 45 mcg/act inhaler; Inhale 1-2 puffs every 4 (four) hours as needed for wheezing  3. Essential hypertension  Assessment & Plan:  Blood pressure is doing quite well today.  No change.  4. Elevated coronary artery calcium score  Assessment & Plan:  Patient does have elevated coronary calcium score.  Follow-up with Dr. Luna for routine treatment.  This does mean there is a slight increased risk of coronary artery disease issues, which again goes towards earlier treatment for COVID if there is increased symptoms.  5. Overweight (BMI 25.0-29.9)  Assessment & Plan:  Patient's BMI is somewhat elevated, but not to the point of obesity.  Still, with increased BMI he does  "qualify for treatment for COVID as an increased risk factor.  6. Paroxysmal atrial fibrillation (HCC)  Assessment & Plan:  Stable at the moment.  Continues on Tambocor, Eliquis.  Should not stop these.  Cannot take Paxlovid based on this.  7. Fever, unspecified fever cause  -     POCT Rapid Covid Ag  8. Close exposure to COVID-19 virus  -     POCT Rapid Covid Ag  9. Tobacco dependence due to cigarettes  Assessment & Plan:  Recommend quit smoking.  Patient reports he is a someday smoker at this point.       History of Present Illness   {Disappearing Hyperlinks I Encounters * My Last Note * Since Last Visit * History :44340}  HPI    Review of Systems  {Select to Display PMH (Optional):39703}  Objective   {Disappearing Hyperlinks   Review Vitals * Enter New Vitals * Results Review * Labs * Imaging * Cardiology * Procedures * Lung Cancer Screening :79153}  /68 (BP Location: Right arm, Patient Position: Sitting, Cuff Size: Large)   Pulse 82   Temp 98.4 °F (36.9 °C) (Tympanic)   Ht 6' 1\" (1.854 m)   Wt 99.8 kg (220 lb)   SpO2 95%   BMI 29.03 kg/m²     Physical Exam  Administrative Statements {Disappearing Hyperlinks I  Level of Service * PCMH/PCSP:79707}  {Time Spent Statement (Optional):01888}        "

## 2024-06-20 NOTE — ASSESSMENT & PLAN NOTE
Lungs clear today.  Does not have any inhalers.  Would send Xopenex to the pharmacy that he can  if he is having increasing shortness of breath or wheezing.

## 2024-06-20 NOTE — PROGRESS NOTES
COVID-19 Outpatient Progress Note  Name: Sushil Harrison      : 1955      MRN: [id number]  Encounter Provider: Reji Thornton MD  Encounter Date: 2024   Encounter department: [facility]    Assessment & Plan   1. COVID-19 virus infection  Assessment & Plan:  Patient with typical COVID-19 symptoms, therefore I do feel that he has COVID-19.  Will treat symptomatically with Tylenol, respiratory treatments.  Currently, the patient does not have severe symptoms, but definitely has symptoms consistent with COVID.  Since he started symptoms today, it is likely that the test was just a little early.  He can test later on on Saturday, or , and if he comes back positive for that, would recommend that he contact the office and consider treatment with Molnupiravir, as he is not able to take Paxlovid due to medication interactions with Eliquis and Tambocor.  Orders:  -     levalbuterol (Xopenex HFA) 45 mcg/act inhaler; Inhale 1-2 puffs every 4 (four) hours as needed for wheezing  2. Centrilobular emphysema (HCC)  Assessment & Plan:  Lungs clear today.  Does not have any inhalers.  Would send Xopenex to the pharmacy that he can  if he is having increasing shortness of breath or wheezing.    Orders:  -     levalbuterol (Xopenex HFA) 45 mcg/act inhaler; Inhale 1-2 puffs every 4 (four) hours as needed for wheezing  3. Essential hypertension  Assessment & Plan:  Blood pressure is doing quite well today.  No change.  4. Elevated coronary artery calcium score  Assessment & Plan:  Patient does have elevated coronary calcium score.  Follow-up with Dr. Luna for routine treatment.  This does mean there is a slight increased risk of coronary artery disease issues, which again goes towards earlier treatment for COVID if there is increased symptoms.  5. Overweight (BMI 25.0-29.9)  Assessment & Plan:  Patient's BMI is somewhat elevated, but not to the point of obesity.  Still, with  increased BMI he does qualify for treatment for COVID as an increased risk factor.  6. Paroxysmal atrial fibrillation (HCC)  Assessment & Plan:  Stable at the moment.  Continues on Tambocor, Eliquis.  Should not stop these.  Cannot take Paxlovid based on this.  7. Fever, unspecified fever cause  -     POCT Rapid Covid Ag  8. Close exposure to COVID-19 virus  -     POCT Rapid Covid Ag  9. Tobacco dependence due to cigarettes  Assessment & Plan:  Recommend quit smoking.  Patient reports he is a someday smoker at this point.      Disposition:     I recommended COVID-19 PCR test 5 days after close contact exposure. Patient does not need to quarantine and should wear a high quality mask for 10 days. If testing on day 5 reveals you are positive, you should immediately isolate. Patient was advised that they can go back to your normal activities when, for at least 24 hours, both are true: their symptoms are getting better overall and they have not had a fever (and are not using fever-reducing medication).    When going back to your normal activities, take added precaution over the next 5 days, such as taking additional steps for  air, hygiene, masks, physical distancing, and/or testing when you will be around other people indoors. You may still be able to spread the virus that made you sick, even if you are feeling better.    If patient develops a fever or starts to feel worse after they have gone back to normal activities, stay home and away from others again until, for at least 24 hours, both are true: symptoms are improving overall and they have not had a fever (and are not using fever-reducing medication). Then take added precaution for the next 5 days.      Discussed symptom directed medication options with patient. Discussed vitamin D, vitamin C, and/or zinc supplementation with patient.     I have spent a total time of 30 minutes on the day of the encounter for this patient including discussing diagnostic  results, discussing prognosis, risks and benefits of treatment options, instructions for management, patient and family education, importance of treatment compliance, risk factor reductions, impressions, counseling/coordination of care, documenting in the medical record, reviewing/ordering tests, medicine, procedures and obtaining or reviewing history.       Tobacco Cessation Counseling: Tobacco cessation counseling was provided. The patient is sincerely urged to quit consumption of tobacco. He is not ready to quit tobacco. Medication options and side effects of medication discussed. Patient refused medication.         Encounter provider: Reji Thornton MD     Provider located at: [facility]  [address] [address] [phone]     Recent Visits  No visits were found meeting these conditions.  Showing recent visits within past 7 days and meeting all other requirements  Today's Visits  Date Type Provider Dept   06/20/24 Office Visit Reji Thornton MD [facility]   Showing today's visits and meeting all other requirements  Future Appointments  No visits were found meeting these conditions.  Showing future appointments within next 150 days and meeting all other requirements    History of Present Illness      Subjective:   Sushil Harrison is a 68 y.o. male who is concerned about COVID-19. Patient's symptoms include fever, chills, fatigue, malaise, nasal congestion, sore throat (Slight), myalgias and headache. Patient denies rhinorrhea, anosmia, loss of taste, cough, shortness of breath, chest tightness, abdominal pain, nausea, vomiting and diarrhea.     - Date of symptom onset: 6/20/2024  - Date of exposure: 6/18/2024      COVID-19 vaccination status: Fully vaccinated with booster    Exposure:   Contact with a person who is under investigation (PUI) for or who is positive for COVID-19 within the last 14 days?: Yes    Hospitalized  "recently for fever and/or lower respiratory symptoms?: No      Currently a healthcare worker that is involved in direct patient care?: No      Works in a special setting where the risk of COVID-19 transmission may be high? (this may include long-term care, correctional and nursing home facilities; homeless shelters; assisted-living facilities and group homes.): No      Resident in a special setting where the risk of COVID-19 transmission may be high? (this may include long-term care, correctional and nursing home facilities; homeless shelters; assisted-living facilities and group homes.): No      Patient's wife recently was diagnosed with COVID at home with a, positive test.  Overnight, he developed symptoms of COVID.  Please see the review of systems for symptoms.  Most of them have been fatigue, aches, chills.  Did take Tylenol so far, which helped relieve some of the symptoms.    Lab Results   Component Value Date    SARSCOVAG Negative 06/20/2024       Review of Systems   Constitutional:  Positive for chills, fatigue and fever.   HENT:  Positive for congestion and sore throat (Slight). Negative for rhinorrhea.    Respiratory:  Negative for cough, chest tightness and shortness of breath.    Gastrointestinal:  Negative for abdominal pain, diarrhea, nausea and vomiting.   Musculoskeletal:  Positive for myalgias.   Neurological:  Positive for headaches.     Objective     /68 (BP Location: Right arm, Patient Position: Sitting, Cuff Size: Large)   Pulse 82   Temp 98.4 °F (36.9 °C) (Tympanic)   Ht 6' 1\" (1.854 m)   Wt 99.8 kg (220 lb)   SpO2 95%   BMI 29.03 kg/m²     Physical Exam  Vitals and nursing note reviewed.   Constitutional:       Appearance: Normal appearance. He is well-developed.   HENT:      Head: Normocephalic and atraumatic.   Cardiovascular:      Rate and Rhythm: Normal rate and regular rhythm.      Pulses:           Carotid pulses are 2+ on the right side and 2+ on the left side.     Heart sounds: " Normal heart sounds. No murmur heard.     No friction rub. No gallop.   Pulmonary:      Effort: Pulmonary effort is normal. No respiratory distress.      Breath sounds: Normal breath sounds. No wheezing or rales.   Musculoskeletal:      Cervical back: Normal range of motion and neck supple.   Neurological:      Mental Status: He is alert.

## 2024-06-20 NOTE — TELEPHONE ENCOUNTER
Returned call to patient about his concern--if needs paxlovid and flecainide The 2  are not to be taken together per Dr Dietz if patient would need paxlovid he could stop the flecainide while on the medication and restart the course of paxlovid was complete.  Pt states he is afraid he would have a bout of at fib if pauses the flecainide.Pt has OV with PCP today and will discuss.. the patient does not have COVID but is thinking what if  because his wife has it.

## 2024-06-20 NOTE — ASSESSMENT & PLAN NOTE
Stable at the moment.  Continues on Tambocor, Eliquis.  Should not stop these.  Cannot take Paxlovid based on this.

## 2024-06-20 NOTE — TELEPHONE ENCOUNTER
Patient currently does not have a positive COVID test, therefore I did not recommend starting anything at this time.  The medication is Molnupiravir.  According to the therapeutic  from [facility], [facility] at [address] both appear to have this medication available.  [facility] in [place] as well as [facility] in [place] have had this in the past, but there is no data on their site as to whether or not it currently is available.

## 2024-06-21 ENCOUNTER — TELEPHONE (OUTPATIENT)
Age: 69
End: 2024-06-21

## 2024-06-21 DIAGNOSIS — U07.1 COVID-19 VIRUS INFECTION: ICD-10-CM

## 2024-06-21 RX ORDER — MOLNUPIRAVIR 200 MG/1
800 CAPSULE ORAL EVERY 12 HOURS
Qty: 40 CAPSULE | Refills: 0 | Status: SHIPPED | OUTPATIENT
Start: 2024-06-21 | End: 2024-06-26

## 2024-06-21 RX ORDER — MOLNUPIRAVIR 200 MG/1
800 CAPSULE ORAL EVERY 12 HOURS
Qty: 40 CAPSULE | Refills: 0 | Status: SHIPPED | OUTPATIENT
Start: 2024-06-21 | End: 2024-06-21 | Stop reason: SDUPTHER

## 2024-06-21 NOTE — TELEPHONE ENCOUNTER
"Patient called, request hard copy prescription for Molnupiravir. , just in case he test positive for COVID this weekend , patient states ,\"He will be going away'.. Patient states, he would like to  copy. Upon chart review/messages, confirmed previous message regarding the matter, conveyed messages of requirement of medication. Patient request a callback when hardcopy is issued. Please advise Patient at [phone], if any further questions.   "

## 2024-06-21 NOTE — TELEPHONE ENCOUNTER
Pt called in because he will like a paper copy of medication for covid. He is going to test himself now because he is not feeling well and needs medication before he leaves. He will be calling me back with results

## 2024-06-21 NOTE — TELEPHONE ENCOUNTER
1. COVID-19 virus infection  -     molnupiravir (Lagevrio) 200 mg capsule; Take 4 capsules (800 mg total) by mouth every 12 (twelve) hours for 5 days    Patient should stay home and isolate, would not recommend travel while he is positive for COVID.  This is even with the medication.

## 2024-08-01 DIAGNOSIS — R35.1 BPH ASSOCIATED WITH NOCTURIA: ICD-10-CM

## 2024-08-01 DIAGNOSIS — N40.1 BPH ASSOCIATED WITH NOCTURIA: ICD-10-CM

## 2024-08-01 RX ORDER — TAMSULOSIN HYDROCHLORIDE 0.4 MG/1
0.4 CAPSULE ORAL
Qty: 100 CAPSULE | Refills: 1 | Status: SHIPPED | OUTPATIENT
Start: 2024-08-01

## 2024-09-06 ENCOUNTER — APPOINTMENT (OUTPATIENT)
Dept: LAB | Facility: CLINIC | Age: 69
End: 2024-09-06
Payer: COMMERCIAL

## 2024-09-06 DIAGNOSIS — J43.2 CENTRILOBULAR EMPHYSEMA (HCC): ICD-10-CM

## 2024-09-06 DIAGNOSIS — B35.3 TINEA PEDIS, UNSPECIFIED LATERALITY: ICD-10-CM

## 2024-09-06 DIAGNOSIS — Z72.0 TOBACCO ABUSE: ICD-10-CM

## 2024-09-06 DIAGNOSIS — D72.829 LEUKOCYTOSIS, UNSPECIFIED TYPE: ICD-10-CM

## 2024-09-06 DIAGNOSIS — S16.1XXD STRAIN OF NECK MUSCLE, SUBSEQUENT ENCOUNTER: ICD-10-CM

## 2024-09-06 DIAGNOSIS — E78.2 MIXED HYPERLIPIDEMIA: ICD-10-CM

## 2024-09-06 DIAGNOSIS — R35.1 BPH ASSOCIATED WITH NOCTURIA: ICD-10-CM

## 2024-09-06 DIAGNOSIS — N40.1 BPH ASSOCIATED WITH NOCTURIA: ICD-10-CM

## 2024-09-06 DIAGNOSIS — I10 ESSENTIAL HYPERTENSION: ICD-10-CM

## 2024-09-06 DIAGNOSIS — R73.9 HYPERGLYCEMIA: ICD-10-CM

## 2024-09-06 DIAGNOSIS — I48.0 PAROXYSMAL ATRIAL FIBRILLATION (HCC): ICD-10-CM

## 2024-09-06 DIAGNOSIS — E27.8 ADRENAL NODULE (HCC): ICD-10-CM

## 2024-09-06 DIAGNOSIS — E04.2 MULTIPLE THYROID NODULES: ICD-10-CM

## 2024-09-06 LAB
ALBUMIN SERPL BCG-MCNC: 4.2 G/DL (ref 3.5–5)
ALP SERPL-CCNC: 62 U/L (ref 34–104)
ALT SERPL W P-5'-P-CCNC: 12 U/L (ref 7–52)
ANION GAP SERPL CALCULATED.3IONS-SCNC: 8 MMOL/L (ref 4–13)
AST SERPL W P-5'-P-CCNC: 10 U/L (ref 13–39)
BILIRUB SERPL-MCNC: 0.86 MG/DL (ref 0.2–1)
BILIRUB UR QL STRIP: NEGATIVE
BUN SERPL-MCNC: 17 MG/DL (ref 5–25)
CALCIUM SERPL-MCNC: 9.4 MG/DL (ref 8.4–10.2)
CHLORIDE SERPL-SCNC: 104 MMOL/L (ref 96–108)
CHOLEST SERPL-MCNC: 97 MG/DL
CLARITY UR: NORMAL
CO2 SERPL-SCNC: 27 MMOL/L (ref 21–32)
COLOR UR: YELLOW
CREAT SERPL-MCNC: 0.65 MG/DL (ref 0.6–1.3)
ERYTHROCYTE [DISTWIDTH] IN BLOOD BY AUTOMATED COUNT: 13.2 % (ref 11.6–15.1)
EST. AVERAGE GLUCOSE BLD GHB EST-MCNC: 120 MG/DL
GFR SERPL CREATININE-BSD FRML MDRD: 99 ML/MIN/1.73SQ M
GLUCOSE P FAST SERPL-MCNC: 95 MG/DL (ref 65–99)
GLUCOSE UR STRIP-MCNC: NEGATIVE MG/DL
HBA1C MFR BLD: 5.8 %
HCT VFR BLD AUTO: 47.5 % (ref 36.5–49.3)
HDLC SERPL-MCNC: 30 MG/DL
HGB BLD-MCNC: 15.7 G/DL (ref 12–17)
HGB UR QL STRIP.AUTO: NEGATIVE
KETONES UR STRIP-MCNC: NEGATIVE MG/DL
LDLC SERPL CALC-MCNC: 57 MG/DL (ref 0–100)
LDLC SERPL DIRECT ASSAY-MCNC: 63 MG/DL (ref 0–100)
LEUKOCYTE ESTERASE UR QL STRIP: NEGATIVE
MCH RBC QN AUTO: 31.4 PG (ref 26.8–34.3)
MCHC RBC AUTO-ENTMCNC: 33.1 G/DL (ref 31.4–37.4)
MCV RBC AUTO: 95 FL (ref 82–98)
NITRITE UR QL STRIP: NEGATIVE
PH UR STRIP.AUTO: 7 [PH]
PLATELET # BLD AUTO: 323 THOUSANDS/UL (ref 149–390)
PMV BLD AUTO: 9.8 FL (ref 8.9–12.7)
POTASSIUM SERPL-SCNC: 4.2 MMOL/L (ref 3.5–5.3)
PROT SERPL-MCNC: 6.9 G/DL (ref 6.4–8.4)
PROT UR STRIP-MCNC: NEGATIVE MG/DL
PSA SERPL-MCNC: 3.97 NG/ML (ref 0–4)
RBC # BLD AUTO: 5 MILLION/UL (ref 3.88–5.62)
SODIUM SERPL-SCNC: 139 MMOL/L (ref 135–147)
SP GR UR STRIP.AUTO: 1.01 (ref 1–1.03)
TRIGL SERPL-MCNC: 52 MG/DL
TSH SERPL DL<=0.05 MIU/L-ACNC: 0.68 UIU/ML (ref 0.45–4.5)
UROBILINOGEN UR STRIP-ACNC: <2 MG/DL
WBC # BLD AUTO: 10.77 THOUSAND/UL (ref 4.31–10.16)

## 2024-09-06 PROCEDURE — 80061 LIPID PANEL: CPT

## 2024-09-06 PROCEDURE — 84443 ASSAY THYROID STIM HORMONE: CPT

## 2024-09-06 PROCEDURE — 80053 COMPREHEN METABOLIC PANEL: CPT

## 2024-09-06 PROCEDURE — 84153 ASSAY OF PSA TOTAL: CPT

## 2024-09-06 PROCEDURE — 83036 HEMOGLOBIN GLYCOSYLATED A1C: CPT

## 2024-09-06 PROCEDURE — 85027 COMPLETE CBC AUTOMATED: CPT

## 2024-09-06 PROCEDURE — 83721 ASSAY OF BLOOD LIPOPROTEIN: CPT

## 2024-09-06 PROCEDURE — 81003 URINALYSIS AUTO W/O SCOPE: CPT

## 2024-09-06 PROCEDURE — 36415 COLL VENOUS BLD VENIPUNCTURE: CPT

## 2024-09-09 ENCOUNTER — TELEPHONE (OUTPATIENT)
Age: 69
End: 2024-09-09

## 2024-09-09 ENCOUNTER — HOSPITAL ENCOUNTER (OUTPATIENT)
Dept: ULTRASOUND IMAGING | Facility: HOSPITAL | Age: 69
Discharge: HOME | End: 2024-09-09
Payer: COMMERCIAL

## 2024-09-09 DIAGNOSIS — E04.2 MULTIPLE THYROID NODULES: ICD-10-CM

## 2024-09-09 PROCEDURE — 76536 US EXAM OF HEAD AND NECK: CPT

## 2024-09-09 NOTE — TELEPHONE ENCOUNTER
We are currently out of 5mg Eliquis samples. I can give 2 weeks worth of 2.5mg to double up on if patient agreeable.     Placed call to patient. Patient states he can wait a little bit. I told him he can call me back this Friday 9/13 to see if we have any 5mg in stock.

## 2024-09-09 NOTE — TELEPHONE ENCOUNTER
Caller: Patient     Doctor: Carlita Dietz MD      Reason for call: Patient called & stated the he is currently in the donut hole with Eliquis and would like to know are we able to provide him with samples? Pt stated that he will be in the area today and will be able to stop by the office and pick some up. Please call pt and advise.     Call back#: [phone]

## 2024-09-13 ENCOUNTER — OFFICE VISIT (OUTPATIENT)
Dept: FAMILY MEDICINE CLINIC | Facility: CLINIC | Age: 69
End: 2024-09-13
Payer: COMMERCIAL

## 2024-09-13 VITALS
HEIGHT: 73 IN | OXYGEN SATURATION: 98 % | SYSTOLIC BLOOD PRESSURE: 118 MMHG | HEART RATE: 65 BPM | DIASTOLIC BLOOD PRESSURE: 68 MMHG | WEIGHT: 217.6 LBS | RESPIRATION RATE: 16 BRPM | BODY MASS INDEX: 28.84 KG/M2 | TEMPERATURE: 97.3 F

## 2024-09-13 DIAGNOSIS — E27.8 ADRENAL NODULE (HCC): ICD-10-CM

## 2024-09-13 DIAGNOSIS — D72.829 LEUKOCYTOSIS, UNSPECIFIED TYPE: ICD-10-CM

## 2024-09-13 DIAGNOSIS — N40.1 BPH ASSOCIATED WITH NOCTURIA: ICD-10-CM

## 2024-09-13 DIAGNOSIS — M15.9 PRIMARY OSTEOARTHRITIS INVOLVING MULTIPLE JOINTS: ICD-10-CM

## 2024-09-13 DIAGNOSIS — M89.9 BONE DISORDER: ICD-10-CM

## 2024-09-13 DIAGNOSIS — F41.9 ANXIETY: ICD-10-CM

## 2024-09-13 DIAGNOSIS — Z79.01 LONG TERM CURRENT USE OF ANTICOAGULANT THERAPY: ICD-10-CM

## 2024-09-13 DIAGNOSIS — I10 ESSENTIAL HYPERTENSION: ICD-10-CM

## 2024-09-13 DIAGNOSIS — R35.1 BPH ASSOCIATED WITH NOCTURIA: ICD-10-CM

## 2024-09-13 DIAGNOSIS — R93.1 ELEVATED CORONARY ARTERY CALCIUM SCORE: Primary | ICD-10-CM

## 2024-09-13 DIAGNOSIS — Z00.00 HEALTHCARE MAINTENANCE: ICD-10-CM

## 2024-09-13 DIAGNOSIS — J43.2 CENTRILOBULAR EMPHYSEMA (HCC): ICD-10-CM

## 2024-09-13 DIAGNOSIS — F17.210 TOBACCO DEPENDENCE DUE TO CIGARETTES: ICD-10-CM

## 2024-09-13 DIAGNOSIS — E04.2 MULTIPLE THYROID NODULES: ICD-10-CM

## 2024-09-13 DIAGNOSIS — E66.3 OVERWEIGHT (BMI 25.0-29.9): ICD-10-CM

## 2024-09-13 DIAGNOSIS — I48.0 PAROXYSMAL ATRIAL FIBRILLATION (HCC): ICD-10-CM

## 2024-09-13 DIAGNOSIS — R73.9 HYPERGLYCEMIA: ICD-10-CM

## 2024-09-13 DIAGNOSIS — E78.2 MIXED HYPERLIPIDEMIA: ICD-10-CM

## 2024-09-13 PROBLEM — S16.1XXA CERVICAL STRAIN: Status: RESOLVED | Noted: 2024-01-30 | Resolved: 2024-09-13

## 2024-09-13 PROBLEM — U07.1 COVID-19 VIRUS INFECTION: Status: RESOLVED | Noted: 2024-06-20 | Resolved: 2024-09-13

## 2024-09-13 PROCEDURE — G0439 PPPS, SUBSEQ VISIT: HCPCS | Performed by: FAMILY MEDICINE

## 2024-09-13 PROCEDURE — 99214 OFFICE O/P EST MOD 30 MIN: CPT | Performed by: FAMILY MEDICINE

## 2024-09-13 RX ORDER — LORAZEPAM 1 MG/1
1 TABLET ORAL 2 TIMES DAILY
Qty: 180 TABLET | Refills: 1 | Status: SHIPPED | OUTPATIENT
Start: 2024-09-13

## 2024-09-13 NOTE — ASSESSMENT & PLAN NOTE
Well-controlled on atorvastatin 40 mg daily    Orders:    CBC and differential; Future    Comprehensive metabolic panel; Future    Hemoglobin A1C; Future    Lipid Panel with Direct LDL reflex; Future    TSH, 3rd generation with Free T4 reflex; Future    UA (URINE) with reflex to Scope; Future    Vitamin D 25 hydroxy; Future    PSA Total, Diagnostic; Future

## 2024-09-13 NOTE — ASSESSMENT & PLAN NOTE
Stable status post cardioversion follows with cardiology    Orders:    CBC and differential; Future    Comprehensive metabolic panel; Future    Hemoglobin A1C; Future    Lipid Panel with Direct LDL reflex; Future    TSH, 3rd generation with Free T4 reflex; Future    UA (URINE) with reflex to Scope; Future    Vitamin D 25 hydroxy; Future    PSA Total, Diagnostic; Future

## 2024-09-13 NOTE — ASSESSMENT & PLAN NOTE
Currently symptom-free    Orders:    CBC and differential; Future    Comprehensive metabolic panel; Future    Hemoglobin A1C; Future    Lipid Panel with Direct LDL reflex; Future    TSH, 3rd generation with Free T4 reflex; Future    UA (URINE) with reflex to Scope; Future    Vitamin D 25 hydroxy; Future    PSA Total, Diagnostic; Future

## 2024-09-13 NOTE — ASSESSMENT & PLAN NOTE
Pleat cessation recommended CT discussed ordered for 12/24    Orders:    CBC and differential; Future    Comprehensive metabolic panel; Future    Hemoglobin A1C; Future    Lipid Panel with Direct LDL reflex; Future    TSH, 3rd generation with Free T4 reflex; Future    UA (URINE) with reflex to Scope; Future    Vitamin D 25 hydroxy; Future    PSA Total, Diagnostic; Future    CT lung screening program; Future

## 2024-09-13 NOTE — ASSESSMENT & PLAN NOTE
Diet controlled    Orders:    CBC and differential; Future    Comprehensive metabolic panel; Future    Hemoglobin A1C; Future    Lipid Panel with Direct LDL reflex; Future    TSH, 3rd generation with Free T4 reflex; Future    UA (URINE) with reflex to Scope; Future    Vitamin D 25 hydroxy; Future    PSA Total, Diagnostic; Future

## 2024-09-13 NOTE — ASSESSMENT & PLAN NOTE
Stable follows with cardiology    Orders:    CBC and differential; Future    Comprehensive metabolic panel; Future    Hemoglobin A1C; Future    Lipid Panel with Direct LDL reflex; Future    TSH, 3rd generation with Free T4 reflex; Future    UA (URINE) with reflex to Scope; Future    Vitamin D 25 hydroxy; Future    PSA Total, Diagnostic; Future

## 2024-09-13 NOTE — PATIENT INSTRUCTIONS
I recommend complete nicotine/tobacco cessation  Low sugar low-fat diet recommended  Complete screening CT of chest for lung cancer screening in December  Return in 6 months for office visit and blood work sooner if needed

## 2024-09-13 NOTE — ASSESSMENT & PLAN NOTE
Stable, follows with endocrinology, Dr. Lee    Orders:    CBC and differential; Future    Comprehensive metabolic panel; Future    Hemoglobin A1C; Future    Lipid Panel with Direct LDL reflex; Future    TSH, 3rd generation with Free T4 reflex; Future    UA (URINE) with reflex to Scope; Future    Vitamin D 25 hydroxy; Future    PSA Total, Diagnostic; Future

## 2024-09-13 NOTE — ASSESSMENT & PLAN NOTE
Asymptomatic, lungs are clear    Orders:    CBC and differential; Future    Comprehensive metabolic panel; Future    Hemoglobin A1C; Future    Lipid Panel with Direct LDL reflex; Future    TSH, 3rd generation with Free T4 reflex; Future    UA (URINE) with reflex to Scope; Future    Vitamin D 25 hydroxy; Future    PSA Total, Diagnostic; Future

## 2024-09-13 NOTE — ASSESSMENT & PLAN NOTE
PSA has risen to 3.9 was 2.7 was 3.1 before that repeat in 6 months if still elevated would consider urology consultation    Orders:    CBC and differential; Future    Comprehensive metabolic panel; Future    Hemoglobin A1C; Future    Lipid Panel with Direct LDL reflex; Future    TSH, 3rd generation with Free T4 reflex; Future    UA (URINE) with reflex to Scope; Future    Vitamin D 25 hydroxy; Future    PSA Total, Diagnostic; Future

## 2024-09-13 NOTE — ASSESSMENT & PLAN NOTE
On Eliquis per cardiology    Orders:    CBC and differential; Future    Comprehensive metabolic panel; Future    Hemoglobin A1C; Future    Lipid Panel with Direct LDL reflex; Future    TSH, 3rd generation with Free T4 reflex; Future    UA (URINE) with reflex to Scope; Future    Vitamin D 25 hydroxy; Future    PSA Total, Diagnostic; Future

## 2024-09-13 NOTE — PROGRESS NOTES
Ambulatory Visit  Name: Sushil Harrison      : 1955      MRN: [id number]  Encounter Provider: Herminio Lnua DO  Encounter Date: 2024   Encounter department: [facility] PRIMARY CARE  #1.  Elevated coronary calcium*NCT/PAF/anticoagulated, stable follows with cardiology  2.  Thyroid nodule/adrenal nodules, stable follows with endocrinology  3.  Centrilobular emphysema seen on CT, asymptomatic PFTs were normal  4.  Tobacco abuse, CT discussed and ordered for December  5.  DJD/bone disorder currently symptom-free vitamin D   6.  BPH with nocturia, PSA has risen from 2.7-3.9 however it was 3.1 in the past we will repeat in 6 months if still elevated would consider urology consultation  7.  Hyperglycemia diet controlled  8.  Hyperlipidemia stable on Lipitor  9.  Healthcare maintenance Medicare AWV completed  10.  Anxiety, stable patient has contract, PDMP reviewed, lorazepam reordered  11.  Leukocytosis chronic/benign per hematology continue to monitor  12.  Return in 6 months for office visit blood work sooner if needed  Assessment & Plan  Elevated coronary artery calcium score  Stable follows with cardiology    Orders:    CBC and differential; Future    Comprehensive metabolic panel; Future    Hemoglobin A1C; Future    Lipid Panel with Direct LDL reflex; Future    TSH, 3rd generation with Free T4 reflex; Future    UA (URINE) with reflex to Scope; Future    Vitamin D 25 hydroxy; Future    PSA Total, Diagnostic; Future    Essential hypertension  Stable continue present therapy    Orders:    CBC and differential; Future    Comprehensive metabolic panel; Future    Hemoglobin A1C; Future    Lipid Panel with Direct LDL reflex; Future    TSH, 3rd generation with Free T4 reflex; Future    UA (URINE) with reflex to Scope; Future    Vitamin D 25 hydroxy; Future    PSA Total, Diagnostic; Future    Paroxysmal atrial fibrillation (HCC)  Stable status post cardioversion follows with cardiology    Orders:     CBC and differential; Future    Comprehensive metabolic panel; Future    Hemoglobin A1C; Future    Lipid Panel with Direct LDL reflex; Future    TSH, 3rd generation with Free T4 reflex; Future    UA (URINE) with reflex to Scope; Future    Vitamin D 25 hydroxy; Future    PSA Total, Diagnostic; Future    Centrilobular emphysema (HCC)  Asymptomatic, lungs are clear    Orders:    CBC and differential; Future    Comprehensive metabolic panel; Future    Hemoglobin A1C; Future    Lipid Panel with Direct LDL reflex; Future    TSH, 3rd generation with Free T4 reflex; Future    UA (URINE) with reflex to Scope; Future    Vitamin D 25 hydroxy; Future    PSA Total, Diagnostic; Future    Multiple thyroid nodules  TSH normal follows with endocrinology, Dr. Hall    Orders:    CBC and differential; Future    Comprehensive metabolic panel; Future    Hemoglobin A1C; Future    Lipid Panel with Direct LDL reflex; Future    TSH, 3rd generation with Free T4 reflex; Future    UA (URINE) with reflex to Scope; Future    Vitamin D 25 hydroxy; Future    PSA Total, Diagnostic; Future    Primary osteoarthritis involving multiple joints  Currently symptom-free    Orders:    CBC and differential; Future    Comprehensive metabolic panel; Future    Hemoglobin A1C; Future    Lipid Panel with Direct LDL reflex; Future    TSH, 3rd generation with Free T4 reflex; Future    UA (URINE) with reflex to Scope; Future    Vitamin D 25 hydroxy; Future    PSA Total, Diagnostic; Future    Tobacco dependence due to cigarettes  Pleat cessation recommended CT discussed ordered for 12/24    Orders:    CBC and differential; Future    Comprehensive metabolic panel; Future    Hemoglobin A1C; Future    Lipid Panel with Direct LDL reflex; Future    TSH, 3rd generation with Free T4 reflex; Future    UA (URINE) with reflex to Scope; Future    Vitamin D 25 hydroxy; Future    PSA Total, Diagnostic; Future    CT lung screening program; Future    Adrenal nodule (HCC)  Stable,  follows with endocrinology, Dr. Lee    Orders:    CBC and differential; Future    Comprehensive metabolic panel; Future    Hemoglobin A1C; Future    Lipid Panel with Direct LDL reflex; Future    TSH, 3rd generation with Free T4 reflex; Future    UA (URINE) with reflex to Scope; Future    Vitamin D 25 hydroxy; Future    PSA Total, Diagnostic; Future    BPH associated with nocturia  PSA has risen to 3.9 was 2.7 was 3.1 before that repeat in 6 months if still elevated would consider urology consultation    Orders:    CBC and differential; Future    Comprehensive metabolic panel; Future    Hemoglobin A1C; Future    Lipid Panel with Direct LDL reflex; Future    TSH, 3rd generation with Free T4 reflex; Future    UA (URINE) with reflex to Scope; Future    Vitamin D 25 hydroxy; Future    PSA Total, Diagnostic; Future    Hyperglycemia  Diet controlled    Orders:    CBC and differential; Future    Comprehensive metabolic panel; Future    Hemoglobin A1C; Future    Lipid Panel with Direct LDL reflex; Future    TSH, 3rd generation with Free T4 reflex; Future    UA (URINE) with reflex to Scope; Future    Vitamin D 25 hydroxy; Future    PSA Total, Diagnostic; Future    Long term current use of anticoagulant therapy  On Eliquis per cardiology    Orders:    CBC and differential; Future    Comprehensive metabolic panel; Future    Hemoglobin A1C; Future    Lipid Panel with Direct LDL reflex; Future    TSH, 3rd generation with Free T4 reflex; Future    UA (URINE) with reflex to Scope; Future    Vitamin D 25 hydroxy; Future    PSA Total, Diagnostic; Future    Mixed hyperlipidemia  Well-controlled on atorvastatin 40 mg daily    Orders:    CBC and differential; Future    Comprehensive metabolic panel; Future    Hemoglobin A1C; Future    Lipid Panel with Direct LDL reflex; Future    TSH, 3rd generation with Free T4 reflex; Future    UA (URINE) with reflex to Scope; Future    Vitamin D 25 hydroxy; Future    PSA Total, Diagnostic;  Future    Overweight (BMI 25.0-29.9)  BMI 28.71 diet exercise weight loss recommended    Orders:    CBC and differential; Future    Comprehensive metabolic panel; Future    Hemoglobin A1C; Future    Lipid Panel with Direct LDL reflex; Future    TSH, 3rd generation with Free T4 reflex; Future    UA (URINE) with reflex to Scope; Future    Vitamin D 25 hydroxy; Future    PSA Total, Diagnostic; Future    Anxiety  PDMP reviewed, lorazepam reordered    Orders:    LORazepam (ATIVAN) 1 mg tablet; Take 1 tablet (1 mg total) by mouth 2 (two) times a day As needed for anxiety    CBC and differential; Future    Comprehensive metabolic panel; Future    Hemoglobin A1C; Future    Lipid Panel with Direct LDL reflex; Future    TSH, 3rd generation with Free T4 reflex; Future    UA (URINE) with reflex to Scope; Future    Vitamin D 25 hydroxy; Future    PSA Total, Diagnostic; Future    Healthcare maintenance         Leukocytosis, unspecified type  Hematology chronic/benign continue to monitor    Orders:    CBC and differential; Future    Comprehensive metabolic panel; Future    Hemoglobin A1C; Future    Lipid Panel with Direct LDL reflex; Future    TSH, 3rd generation with Free T4 reflex; Future    UA (URINE) with reflex to Scope; Future    Vitamin D 25 hydroxy; Future    PSA Total, Diagnostic; Future    Bone disorder    Orders:    CBC and differential; Future    Comprehensive metabolic panel; Future    Hemoglobin A1C; Future    Lipid Panel with Direct LDL reflex; Future    TSH, 3rd generation with Free T4 reflex; Future    UA (URINE) with reflex to Scope; Future    Vitamin D 25 hydroxy; Future    PSA Total, Diagnostic; Future      Depression Screening and Follow-up Plan: Patient was screened for depression during today's encounter. They screened negative with a PHQ-2 score of 0.      Preventive health issues were discussed with patient, and age appropriate screening tests were ordered as noted in patient's After Visit Summary.  Personalized health advice and appropriate referrals for health education or preventive services given if needed, as noted in patient's After Visit Summary.    History of Present Illness     Patient doing well without any medical complaints or concerns at the present time.  Blood work was discussed.  Unfortunately patient does continue to smoke.       Patient Care Team:  Herminio Luna DO as PCP - General (Family Medicine)  Herminio Luna DO as PCP - [facility]CP-[facility] ([facility])    Review of Systems   Constitutional: Negative.    HENT: Negative.     Eyes: Negative.    Respiratory: Negative.     Cardiovascular: Negative.    Gastrointestinal: Negative.    Endocrine: Negative.    Genitourinary: Negative.    Musculoskeletal: Negative.    Skin: Negative.    Allergic/Immunologic: Negative.    Neurological: Negative.    Hematological: Negative.    Psychiatric/Behavioral: Negative.       Medical History Reviewed by provider this encounter:  Tobacco  Allergies  Meds  Problems  Med Hx  Surg Hx  Fam Hx       Annual Wellness Visit Questionnaire   Sushil is here for his Subsequent Wellness visit.     Health Risk Assessment:   Patient rates overall health as excellent. Patient feels that their physical health rating is same. Patient is satisfied with their life. Eyesight was rated as same. Hearing was rated as same. Patient feels that their emotional and mental health rating is much better. Patients states they are never, rarely angry. Patient states they are sometimes unusually tired/fatigued. Pain experienced in the last 7 days has been none. Patient states that he has experienced no weight loss or gain in last 6 months.     Depression Screening:   PHQ-2 Score: 0      Fall Risk Screening:   In the past year, patient has experienced: no history of falling in past year      Home Safety:  Patient does not have trouble with stairs inside or outside of their home. Patient has working smoke alarms and has working  carbon monoxide detector. Home safety hazards include: none.     Nutrition:   Current diet is Regular.     Medications:   Patient is currently taking over-the-counter supplements. OTC medications include: see medication list. Patient is able to manage medications.     Activities of Daily Living (ADLs)/Instrumental Activities of Daily Living (IADLs):   Walk and transfer into and out of bed and chair?: Yes  Dress and groom yourself?: Yes    Bathe or shower yourself?: Yes    Feed yourself? Yes  Do your laundry/housekeeping?: Yes  Manage your money, pay your bills and track your expenses?: Yes  Make your own meals?: Yes    Do your own shopping?: Yes    Previous Hospitalizations:   Any hospitalizations or ED visits within the last 12 months?: No      Advance Care Planning:   Living will: Yes    Durable POA for healthcare: Yes    Advanced directive: Yes    Advanced directive counseling given: Yes    Five wishes given: No    Patient declined ACP directive: No    End of Life Decisions reviewed with patient: Yes    Provider agrees with end of life decisions: Yes      Cognitive Screening:   Provider or family/friend/caregiver concerned regarding cognition?: No    PREVENTIVE SCREENINGS      Cardiovascular Screening:    General: Screening Not Indicated and History Lipid Disorder      Diabetes Screening:     General: Screening Current      Colorectal Cancer Screening:     General: Screening Current      Prostate Cancer Screening:    General: Screening Current      Osteoporosis Screening:    General: Screening Not Indicated      Abdominal Aortic Aneurysm (AAA) Screening:    Risk factors include: age between 65-74 yo and tobacco use        General: Screening Not Indicated      Lung Cancer Screening:     General: Screening Current      Hepatitis C Screening:    General: Screening Current    Screening, Brief Intervention, and Referral to Treatment (SBIRT)    Screening  Typical number of drinks in a day: 0  Typical number of drinks  "in a week: 0  Interpretation: Low risk drinking behavior.    Social Determinants of Health     Financial Resource Strain: Low Risk  (9/11/2023)    Overall Financial Resource Strain (CARDIA)     Difficulty of Paying Living Expenses: Not hard at all   Food Insecurity: No Food Insecurity (9/13/2024)    Hunger Vital Sign     Worried About Running Out of Food in the Last Year: Never true     Ran Out of Food in the Last Year: Never true   Transportation Needs: No Transportation Needs (9/13/2024)    PRAPARE - Transportation     Lack of Transportation (Medical): No     Lack of Transportation (Non-Medical): No   Housing Stability: Low Risk  (9/13/2024)    Housing Stability Vital Sign     Unable to Pay for Housing in the Last Year: No     Number of Times Moved in the Last Year: 0     Homeless in the Last Year: No   Utilities: Not At Risk (9/13/2024)    [facility] Utilities     Threatened with loss of utilities: No     No results found.    Objective     /68 (BP Location: Right arm, Patient Position: Sitting, Cuff Size: Adult)   Pulse 65   Temp (!) 97.3 °F (36.3 °C) (Temporal)   Resp 16   Ht 6' 1\" (1.854 m)   Wt 98.7 kg (217 lb 9.6 oz)   SpO2 98%   BMI 28.71 kg/m²     Physical Exam  Vitals and nursing note reviewed.   Constitutional:       Appearance: Normal appearance.   HENT:      Head: Normocephalic and atraumatic.      Mouth/Throat:      Mouth: Mucous membranes are moist.   Eyes:      General: No scleral icterus.  Neck:      Vascular: No carotid bruit.   Cardiovascular:      Rate and Rhythm: Normal rate and regular rhythm.      Heart sounds: Normal heart sounds.   Pulmonary:      Effort: Pulmonary effort is normal.      Breath sounds: Normal breath sounds.   Abdominal:      General: Bowel sounds are normal.      Palpations: Abdomen is soft.      Tenderness: There is no abdominal tenderness.   Musculoskeletal:      Cervical back: Neck supple.      Right lower leg: No edema.      Left lower leg: No edema.   Skin:     " General: Skin is warm and dry.   Neurological:      General: No focal deficit present.      Mental Status: He is alert.   Psychiatric:         Mood and Affect: Mood normal.

## 2024-09-13 NOTE — ASSESSMENT & PLAN NOTE
BMI 28.71 diet exercise weight loss recommended    Orders:    CBC and differential; Future    Comprehensive metabolic panel; Future    Hemoglobin A1C; Future    Lipid Panel with Direct LDL reflex; Future    TSH, 3rd generation with Free T4 reflex; Future    UA (URINE) with reflex to Scope; Future    Vitamin D 25 hydroxy; Future    PSA Total, Diagnostic; Future

## 2024-09-13 NOTE — ASSESSMENT & PLAN NOTE
Hematology chronic/benign continue to monitor    Orders:    CBC and differential; Future    Comprehensive metabolic panel; Future    Hemoglobin A1C; Future    Lipid Panel with Direct LDL reflex; Future    TSH, 3rd generation with Free T4 reflex; Future    UA (URINE) with reflex to Scope; Future    Vitamin D 25 hydroxy; Future    PSA Total, Diagnostic; Future

## 2024-09-13 NOTE — ASSESSMENT & PLAN NOTE
Stable continue present therapy    Orders:    CBC and differential; Future    Comprehensive metabolic panel; Future    Hemoglobin A1C; Future    Lipid Panel with Direct LDL reflex; Future    TSH, 3rd generation with Free T4 reflex; Future    UA (URINE) with reflex to Scope; Future    Vitamin D 25 hydroxy; Future    PSA Total, Diagnostic; Future

## 2024-09-13 NOTE — ASSESSMENT & PLAN NOTE
TSH normal follows with endocrinology, Dr. Hall    Orders:    CBC and differential; Future    Comprehensive metabolic panel; Future    Hemoglobin A1C; Future    Lipid Panel with Direct LDL reflex; Future    TSH, 3rd generation with Free T4 reflex; Future    UA (URINE) with reflex to Scope; Future    Vitamin D 25 hydroxy; Future    PSA Total, Diagnostic; Future

## 2024-09-13 NOTE — ASSESSMENT & PLAN NOTE
PDMP reviewed, lorazepam reordered    Orders:    LORazepam (ATIVAN) 1 mg tablet; Take 1 tablet (1 mg total) by mouth 2 (two) times a day As needed for anxiety    CBC and differential; Future    Comprehensive metabolic panel; Future    Hemoglobin A1C; Future    Lipid Panel with Direct LDL reflex; Future    TSH, 3rd generation with Free T4 reflex; Future    UA (URINE) with reflex to Scope; Future    Vitamin D 25 hydroxy; Future    PSA Total, Diagnostic; Future

## 2024-09-16 NOTE — TELEPHONE ENCOUNTER
Patient called back in regards to the Eliquis samples. We now have 5mg in stock.     Lot: [id number]  Exp: Jan 2026  Dispensed: 3 boxes

## 2024-11-07 ENCOUNTER — TELEPHONE (OUTPATIENT)
Dept: CARDIOLOGY CLINIC | Facility: CLINIC | Age: 69
End: 2024-11-07

## 2024-11-07 ENCOUNTER — TELEPHONE (OUTPATIENT)
Age: 69
End: 2024-11-07

## 2024-11-07 NOTE — TELEPHONE ENCOUNTER
Reviewed chart.  Eliquis prescribed by PCP.    Phoned patient.  Reviewed eliquis prescribed by PCP and recommending patient to reach out to PCP for any available samples.  Per patient, PCP does not have samples. Will review with PCP for any additional support for eliquis

## 2024-11-07 NOTE — TELEPHONE ENCOUNTER
Pt would like to know if he could  some Eliquis boxes from the office? He kindly asks Dr. Palomo for 3-5 boxes to keep him up and going. Pt would like a call back to discuss at [phone]. Thank you.

## 2024-11-07 NOTE — TELEPHONE ENCOUNTER
Patient in donut hole and asking for more samples.  He received some in June and Sept.     2 boxes given Lot [id number]                          Exp 1/2026

## 2024-11-08 DIAGNOSIS — I48.0 PAROXYSMAL ATRIAL FIBRILLATION (HCC): ICD-10-CM

## 2024-11-24 DIAGNOSIS — I48.0 PAROXYSMAL ATRIAL FIBRILLATION (HCC): ICD-10-CM

## 2024-11-26 RX ORDER — METOPROLOL TARTRATE 25 MG/1
25 TABLET, FILM COATED ORAL 2 TIMES DAILY
Qty: 200 TABLET | Refills: 1 | Status: SHIPPED | OUTPATIENT
Start: 2024-11-26

## 2024-12-03 DIAGNOSIS — I48.0 PAROXYSMAL ATRIAL FIBRILLATION (HCC): ICD-10-CM

## 2024-12-03 DIAGNOSIS — I10 ESSENTIAL HYPERTENSION, BENIGN: ICD-10-CM

## 2024-12-03 DIAGNOSIS — E78.2 MIXED HYPERLIPIDEMIA: ICD-10-CM

## 2024-12-04 RX ORDER — AMLODIPINE BESYLATE 5 MG/1
5 TABLET ORAL 2 TIMES DAILY
Qty: 200 TABLET | Refills: 1 | Status: SHIPPED | OUTPATIENT
Start: 2024-12-04

## 2024-12-04 RX ORDER — LISINOPRIL 20 MG/1
20 TABLET ORAL 2 TIMES DAILY
Qty: 200 TABLET | Refills: 1 | Status: SHIPPED | OUTPATIENT
Start: 2024-12-04

## 2024-12-04 RX ORDER — APIXABAN 5 MG/1
5 TABLET, FILM COATED ORAL 2 TIMES DAILY
Qty: 180 TABLET | Refills: 1 | Status: SHIPPED | OUTPATIENT
Start: 2024-12-04

## 2024-12-04 RX ORDER — ATORVASTATIN CALCIUM 40 MG/1
40 TABLET, FILM COATED ORAL DAILY
Qty: 100 TABLET | Refills: 1 | Status: SHIPPED | OUTPATIENT
Start: 2024-12-04

## 2024-12-06 ENCOUNTER — HOSPITAL ENCOUNTER (OUTPATIENT)
Dept: CT IMAGING | Facility: HOSPITAL | Age: 69
Discharge: HOME | End: 2024-12-06

## 2024-12-06 DIAGNOSIS — F17.210 TOBACCO DEPENDENCE DUE TO CIGARETTES: ICD-10-CM

## 2024-12-11 ENCOUNTER — RESULTS FOLLOW-UP (OUTPATIENT)
Dept: FAMILY MEDICINE CLINIC | Facility: CLINIC | Age: 69
End: 2024-12-11

## 2024-12-11 DIAGNOSIS — I77.810 THORACIC AORTIC ECTASIA (HCC): Primary | ICD-10-CM

## 2025-01-02 DIAGNOSIS — I10 ESSENTIAL HYPERTENSION, BENIGN: ICD-10-CM

## 2025-01-02 DIAGNOSIS — I48.0 PAROXYSMAL ATRIAL FIBRILLATION (HCC): ICD-10-CM

## 2025-01-02 RX ORDER — LISINOPRIL 20 MG/1
20 TABLET ORAL 2 TIMES DAILY
Qty: 200 TABLET | Refills: 3 | Status: SHIPPED | OUTPATIENT
Start: 2025-01-02

## 2025-01-02 NOTE — TELEPHONE ENCOUNTER
Patient walked in to office stating [facility] dispensed #60 of Eliquis instead of a 90 day supply that we sent over on 12/4/2024. Patient also states his lisinopril bottle at home shows no refills but our records shows one refill. He is requesting that we call in new prescriptions for both Lisinopril and Eliquis #200 to [facility].

## 2025-01-03 ENCOUNTER — TELEPHONE (OUTPATIENT)
Dept: CARDIOLOGY CLINIC | Facility: CLINIC | Age: 70
End: 2025-01-03

## 2025-01-03 NOTE — TELEPHONE ENCOUNTER
Patient called requesting refill for atorvastatin. Patient made aware medication was refilled on 12/4/24 for 100 with 1 refills to [facility]. Patient instructed to contact the pharmacy to obtain refills of medication. Patient verbalized understanding.

## 2025-01-20 ENCOUNTER — TELEPHONE (OUTPATIENT)
Age: 70
End: 2025-01-20

## 2025-01-20 NOTE — TELEPHONE ENCOUNTER
Received call from pt stating he has a follow up with Dr. Dietz on 2/6/2025 and noticed he did not order any blood work. He is asking if he needs to get blood work done prior to his appt. Advised will send a message to the provider to review and advise.

## 2025-01-21 ENCOUNTER — PATIENT MESSAGE (OUTPATIENT)
Dept: CARDIOLOGY CLINIC | Facility: CLINIC | Age: 70
End: 2025-01-21

## 2025-01-24 NOTE — TELEPHONE ENCOUNTER
Pt calling again regarding wanting blood work ordered by his cardiologist or he doesn't see the need to come in for his appointment. Advised pt that the blood work he is asking for is already ordered by his pcp except for BNP and Mag. I advised I can ask Dr. Dietz to place orders for those. He is insistent that the all labs (CBC Diff,Mag,Lipid Panel,BNP,CMP,TSH 3rd gen, T4 free and direct LDL) all be ordered by Dr. Dietz.     Please advise

## 2025-01-27 ENCOUNTER — TELEPHONE (OUTPATIENT)
Dept: CARDIOLOGY CLINIC | Facility: CLINIC | Age: 70
End: 2025-01-27

## 2025-01-27 DIAGNOSIS — E78.2 MIXED DYSLIPIDEMIA: ICD-10-CM

## 2025-01-27 DIAGNOSIS — I48.0 PAROXYSMAL ATRIAL FIBRILLATION (HCC): Primary | ICD-10-CM

## 2025-01-27 NOTE — TELEPHONE ENCOUNTER
I spoke to Mr. Harrison to make ensure he is aware Dr. Dietz placed the order for the blood work he wants him to complete before his appointment next week.

## 2025-01-27 NOTE — TELEPHONE ENCOUNTER
Received call from patient regarding lab orders, he is very upset and requesting to speak to  as he has called multiple times and sent a [employer] message and hasn't received a call back. Informed him I will pass the message along to get this resolved as soon as possible for him. He expressed understanding.

## 2025-01-27 NOTE — PATIENT COMMUNICATION
Received call from pt.  He wants a response regarding blood work request.  Pt also requested to speak with Shanelle regarding this.  I spoke with Karma in [place] office who said she was not available at this time.  Advised pt I would send a message to Shanelle and Dr. Dietz.  Please advise.

## 2025-01-27 NOTE — TELEPHONE ENCOUNTER
Pt called several times today in reference to lab orders.  Messages being handled by Dinora Clayton.  Dr Dietz states he will take care of.

## 2025-01-27 NOTE — PROGRESS NOTES
Name: Sushil Harrison      : 1955      MRN: [id number]  Encounter Provider: Carlita Dietz MD  Encounter Date: 2025   Encounter department: [facility] CARDIOLOGY [place]    Basic metabolic panel and thyroid function test and lipid profile ordered.

## 2025-01-27 NOTE — PATIENT COMMUNICATION
Received another call from pt asking to s/w Shanelle regarding him not getting a response from Dr. Dietz. Called [place] office and s/w Karma who stated she will talk directly to Dr. Dietz today to have him address this issue and will give him a call back with the end result. Informed pt who voiced understanding.

## 2025-01-28 DIAGNOSIS — I48.0 PAROXYSMAL ATRIAL FIBRILLATION (HCC): Primary | ICD-10-CM

## 2025-01-28 NOTE — TELEPHONE ENCOUNTER
Received call from pt asking what lab work was ordered by Dr. Dietz. Informed him he ordered a BMP, Lipid panel, and a TSH/T4. Pt would also like a Magnesium level and a BNP ordered. Please review and advise.

## 2025-01-31 ENCOUNTER — RESULTS FOLLOW-UP (OUTPATIENT)
Dept: CARDIOLOGY CLINIC | Facility: CLINIC | Age: 70
End: 2025-01-31

## 2025-01-31 ENCOUNTER — APPOINTMENT (OUTPATIENT)
Dept: LAB | Facility: CLINIC | Age: 70
End: 2025-01-31
Payer: COMMERCIAL

## 2025-01-31 DIAGNOSIS — E78.2 MIXED DYSLIPIDEMIA: ICD-10-CM

## 2025-01-31 DIAGNOSIS — I48.0 PAROXYSMAL ATRIAL FIBRILLATION (HCC): ICD-10-CM

## 2025-01-31 LAB
ANION GAP SERPL CALCULATED.3IONS-SCNC: 6 MMOL/L (ref 4–13)
BNP SERPL-MCNC: 141 PG/ML (ref 0–100)
BUN SERPL-MCNC: 17 MG/DL (ref 5–25)
CALCIUM SERPL-MCNC: 9.3 MG/DL (ref 8.4–10.2)
CHLORIDE SERPL-SCNC: 106 MMOL/L (ref 96–108)
CHOLEST SERPL-MCNC: 99 MG/DL (ref ?–200)
CO2 SERPL-SCNC: 27 MMOL/L (ref 21–32)
CREAT SERPL-MCNC: 0.63 MG/DL (ref 0.6–1.3)
GFR SERPL CREATININE-BSD FRML MDRD: 100 ML/MIN/1.73SQ M
GLUCOSE P FAST SERPL-MCNC: 92 MG/DL (ref 65–99)
HDLC SERPL-MCNC: 29 MG/DL
LDLC SERPL CALC-MCNC: 61 MG/DL (ref 0–100)
MAGNESIUM SERPL-MCNC: 2.2 MG/DL (ref 1.9–2.7)
NONHDLC SERPL-MCNC: 70 MG/DL
POTASSIUM SERPL-SCNC: 4.1 MMOL/L (ref 3.5–5.3)
SODIUM SERPL-SCNC: 139 MMOL/L (ref 135–147)
TRIGL SERPL-MCNC: 46 MG/DL (ref ?–150)
TSH SERPL DL<=0.05 MIU/L-ACNC: 0.69 UIU/ML (ref 0.45–4.5)

## 2025-01-31 PROCEDURE — 80061 LIPID PANEL: CPT

## 2025-01-31 PROCEDURE — 83880 ASSAY OF NATRIURETIC PEPTIDE: CPT

## 2025-01-31 PROCEDURE — 80048 BASIC METABOLIC PNL TOTAL CA: CPT | Performed by: INTERNAL MEDICINE

## 2025-01-31 PROCEDURE — 84443 ASSAY THYROID STIM HORMONE: CPT | Performed by: INTERNAL MEDICINE

## 2025-01-31 PROCEDURE — 36415 COLL VENOUS BLD VENIPUNCTURE: CPT | Performed by: INTERNAL MEDICINE

## 2025-01-31 PROCEDURE — 83735 ASSAY OF MAGNESIUM: CPT

## 2025-02-06 ENCOUNTER — OFFICE VISIT (OUTPATIENT)
Dept: CARDIOLOGY CLINIC | Facility: CLINIC | Age: 70
End: 2025-02-06
Payer: COMMERCIAL

## 2025-02-06 VITALS
DIASTOLIC BLOOD PRESSURE: 60 MMHG | WEIGHT: 222 LBS | HEART RATE: 63 BPM | SYSTOLIC BLOOD PRESSURE: 120 MMHG | BODY MASS INDEX: 29.29 KG/M2

## 2025-02-06 DIAGNOSIS — I10 ESSENTIAL HYPERTENSION: ICD-10-CM

## 2025-02-06 DIAGNOSIS — I77.810 THORACIC AORTIC ECTASIA (HCC): ICD-10-CM

## 2025-02-06 DIAGNOSIS — E13.65 OTHER SPECIFIED DIABETES MELLITUS WITH HYPERGLYCEMIA, WITHOUT LONG-TERM CURRENT USE OF INSULIN (HCC): ICD-10-CM

## 2025-02-06 DIAGNOSIS — I48.0 PAROXYSMAL ATRIAL FIBRILLATION (HCC): Primary | ICD-10-CM

## 2025-02-06 DIAGNOSIS — F17.210 TOBACCO DEPENDENCE DUE TO CIGARETTES: ICD-10-CM

## 2025-02-06 DIAGNOSIS — J43.2 CENTRILOBULAR EMPHYSEMA (HCC): ICD-10-CM

## 2025-02-06 PROCEDURE — G2211 COMPLEX E/M VISIT ADD ON: HCPCS | Performed by: INTERNAL MEDICINE

## 2025-02-06 PROCEDURE — 99214 OFFICE O/P EST MOD 30 MIN: CPT | Performed by: INTERNAL MEDICINE

## 2025-02-06 PROCEDURE — 93000 ELECTROCARDIOGRAM COMPLETE: CPT | Performed by: INTERNAL MEDICINE

## 2025-02-06 RX ORDER — AMIODARONE HYDROCHLORIDE 200 MG/1
TABLET ORAL
Qty: 100 TABLET | Refills: 3 | Status: SHIPPED | OUTPATIENT
Start: 2025-02-06 | End: 2025-11-10

## 2025-02-06 NOTE — ASSESSMENT & PLAN NOTE
She of paroxysmal atrial fibrillation with 1 history of cardioversion and other episodes continuously unremarkable.  Has been on flecainide therapy.  Has significant coronary artery calcification but no known coronary artery disease.  Has multiple CAD risk factors including family history and tobacco dependence due to smoking.  Has been seen by electrophysiologist Dr. Shorty Stephen who advised discontinuing flecainide due to risk of arrhythmias due to presence of underlying CAD.  Patient did not want that in 2023.    Today we discussed again the concerns regarding increased risk of arrhythmias with flecainide given his risk factors.  We discussed the alternative  other alternative antiarrhythmic agent like amiodarone versus ablation.     After due discussion he is agreed to go on amiodarone therapy.  We discussed the need for monitoring some vital organ functions for side effects/toxicities of amiodarone therapy.  Be schedule of monitoring is outlined below.    We are doing the following:  He is advised to discontinue flecainide today.    He is advised to begin amiodarone on Monday morning at a dose of 200 mg twice daily for 7 days and subsequently 200 mg once daily.  We are continuing his anticoagulation with Eliquis and his beta-blocker therapy with metoprolol to tartrate 25 mg twice daily.  We will arrange for him to come back after 2 weeks of amiodarone therapy for an ECG.  He should have follow-up complete thyroid function test and liver function tests in 6 months.  If he develops any palpitation episodes in the interim he is advised to call our office and we will consider cardioversion procedure.  As long as he has been consistent with Eliquis use he does not need a DOREEN.  Will consider transthoracic echocardiogram prior to next visit in 6 months time.    Recommend follow-up with electrophysiology but he would like to wait and consider this in the future if needed.    RECOMMENDED ROUTINE MONITORING FOR  PATIENTS RECEIVING AMIODARONE  Thyroid function tests--- Baseline and every 6 mo   Electrolytes, serum creatinine --- Baseline and as indicated   Chest radiograph--- Baseline and annually   Pulmonary function tests, with Dlco -- Baseline and for unexplained dyspnea or new chest radiographic findings.  Ophthalmologic evaluation--- If visual impairment or for symptoms   ECG--- Baseline and annually      Orders:    POCT ECG    amiodarone 200 mg tablet; Take 1 tablet (200 mg total) by mouth 2 (two) times a day for 7 days, THEN 1 tablet (200 mg total) daily.    Echo complete w/ contrast if indicated; Future    COMPREHENSIVE METABOLIC PANEL(12); Future    TSH+Free T4; Future

## 2025-02-06 NOTE — ASSESSMENT & PLAN NOTE
Has mild ectasia of ascending thoracic aorta measuring 4.2 cm.  Blood pressure is well-controlled.  Biggest risk factor for aortic aneurysm is smoking.  Encouraged again to quit smoking completely.

## 2025-02-06 NOTE — PATIENT INSTRUCTIONS
Assessment & Plan  Paroxysmal atrial fibrillation (HCC)  She of paroxysmal atrial fibrillation with 1 history of cardioversion and other episodes continuously unremarkable.  Has been on flecainide therapy.  Has significant coronary artery calcification but no known coronary artery disease.  Has multiple CAD risk factors including family history and tobacco dependence due to smoking.  Has been seen by electrophysiologist Dr. Shorty Stephen who advised discontinuing flecainide due to risk of arrhythmias due to presence of underlying CAD.  Patient did not want that in 2023.    Today we discussed again the concerns regarding increased risk of arrhythmias with flecainide given his risk factors.  We discussed the alternative  other alternative antiarrhythmic agent like amiodarone versus ablation.     After due discussion he is agreed to go on amiodarone therapy.  We discussed the need for monitoring some vital organ functions for side effects/toxicities of amiodarone therapy.  Be schedule of monitoring is outlined below.    We are doing the following:  He is advised to discontinue flecainide today.    He is advised to begin amiodarone on Monday morning at a dose of 200 mg twice daily for 7 days and subsequently 200 mg once daily.  We are continuing his anticoagulation with Eliquis and his beta-blocker therapy with metoprolol to tartrate 25 mg twice daily.  We will arrange for him to come back after 2 weeks of amiodarone therapy for an ECG.  He should have follow-up complete thyroid function test and liver function tests in 6 months.  If he develops any palpitation episodes in the interim he is advised to call our office and we will consider cardioversion procedure.  As long as he has been consistent with Eliquis use he does not need a DOREEN.  Will consider transthoracic echocardiogram prior to next visit in 6 months time.    Recommend follow-up with electrophysiology but he would like to wait and consider this in the future  if needed.    RECOMMENDED ROUTINE MONITORING FOR PATIENTS RECEIVING AMIODARONE  Thyroid function tests--- Baseline and every 6 mo   Electrolytes, serum creatinine --- Baseline and as indicated   Chest radiograph--- Baseline and annually   Pulmonary function tests, with Dlco -- Baseline and for unexplained dyspnea or new chest radiographic findings.  Ophthalmologic evaluation--- If visual impairment or for symptoms   ECG--- Baseline and annually      Orders:    POCT ECG    amiodarone 200 mg tablet; Take 1 tablet (200 mg total) by mouth 2 (two) times a day for 7 days, THEN 1 tablet (200 mg total) daily.    Echo complete w/ contrast if indicated; Future    COMPREHENSIVE METABOLIC PANEL(12); Future    TSH+Free T4; Future    Thoracic aortic ectasia (HCC)  Has mild ectasia of ascending thoracic aorta measuring 4.2 cm.  Blood pressure is well-controlled.  Biggest risk factor for aortic aneurysm is smoking.  Encouraged again to quit smoking completely.       Other specified diabetes mellitus with hyperglycemia, without long-term current use of insulin (HCC)    Lab Results   Component Value Date    HGBA1C 5.8 (H) 09/06/2024   Has A1c in prediabetes range.  Advising to continue current medications.         Essential hypertension  Blood pressure is well-controlled.  Will continue current medications.       Tobacco dependence due to cigarettes  Has elevated ASCVD risk due to smoking and family history.  Encouraged complete smoking cessation.       Centrilobular emphysema (HCC)  Lungs are clear on examination.  Recent lung scan CT did not identify significant emphysematous changes.

## 2025-02-06 NOTE — ASSESSMENT & PLAN NOTE
Lungs are clear on examination.  Recent lung scan CT did not identify significant emphysematous changes.

## 2025-02-06 NOTE — ASSESSMENT & PLAN NOTE
Has elevated ASCVD risk due to smoking and family history.  Encouraged complete smoking cessation.

## 2025-02-06 NOTE — PROGRESS NOTES
Name: Sushil Harrison      : 1955      MRN: [id number]  Encounter Provider: Carlita Dietz MD  Encounter Date: 2025   Encounter department: [facility]    DIAGNOSES:  1. Paroxysmal atrial fibrillation status post cardioversion in    2. Mixed dyslipidemia  3. Benign essential hypertension  4. Chronic tobacco use  5. Suspected sleep apnea  6. Diverticular disease without diverticulitis  7. Adrenal adenoma  8. Multiple thyroid nodules    CORONARY ARTERY CALCIUM SCORING 2023:  Left main coronary artery:  0  Left anterior descending coronary artery and diagonal branches:  15  Left circumflex coronary artery and left marginal branches:  0  Right coronary artery and right marginal branches:  532m  Posterior descending coronary artery: 0  TOTAL coronary calcium score:  547    REGADENOSON NUCLEAR STRESS TEST 3/20/2023:   ·  Stress ECG: The stress ECG is negative for ischemia after pharmacologic vasodilation, without reproduction of symptoms.  ·  Perfusion: There is a left ventricular perfusion defect with mild reduction in uptake present in the entire inferior location(s) that is paradoxical.  ·  Stress Function: Left ventricular function post-stress is normal. Post-stress ejection fraction is 57 %.  ·  Stress Combined Conclusion: The ECG and SPECT imaging portions of the stress study are concordant with no evidence of stress induced myocardial ischemia. There is image artifact, without diagnostic evidence for perfusion abnormality.    ECHOCARDIOGRAM 2022:  Mildly dilated left ventricle cavity, normal wall thickness, normal left ventricle systolic function, normal left ventricle diastolic function, EF 55%, not mildly dilated right ventricle, normal right ventricle systolic function, mild left atrial enlargement, normal right atrial size, normal aortic valve without stenosis or regurgitation, normal mitral valve without stenosis or regurgitation, trace tricuspid valve  regurgitation, mild pulmonic valve regurgitation, no pericardial effusion.  Mild ectasia of ascending aorta measuring up to 3.9 cm.    PERSANTINE NUCLEAR STRESS TEST in July 2015 showed normal perfusion and normal left ventricular systolic function with mildly dilated left ventricular cavity, EF was 58%.    ECHOCARDIOGRAM June 2018:  - Mild left ventricular cavity enlargement, normal left  ventricle systolic function and normal diastolic function. EF  approximately 50-55%.  - Mild right atrial and mild to moderate left atrial  enlargement.  - Aortic valve leaflet sclerosis. No stenosis or regurgitation.  - Mitral valve leaflet sclerosis. Trace mitral valve  regurgitation.  - Mild tricuspid and pulmonic valve regurgitation.  - No pericardial effusion.  - No pulmonary hypertension.     TRANSESOPHAGEAL ECHOCARDIOGRAM in June 2015 showed mild-to-moderate left atrial enlargement, borderline reduced left ventricular systolic function.    Assessment & Plan  Paroxysmal atrial fibrillation (HCC)  She of paroxysmal atrial fibrillation with 1 history of cardioversion and other episodes continuously unremarkable.  Has been on flecainide therapy.  Has significant coronary artery calcification but no known coronary artery disease.  Has multiple CAD risk factors including family history and tobacco dependence due to smoking.  Has been seen by electrophysiologist Dr. Shorty Stephen who advised discontinuing flecainide due to risk of arrhythmias due to presence of underlying CAD.  Patient did not want that in 2023.    Today we discussed again the concerns regarding increased risk of arrhythmias with flecainide given his risk factors.  We discussed the alternative  other alternative antiarrhythmic agent like amiodarone versus ablation.     After due discussion he is agreed to go on amiodarone therapy.  We discussed the need for monitoring some vital organ functions for side effects/toxicities of amiodarone therapy.  Be schedule of  monitoring is outlined below.    We are doing the following:  He is advised to discontinue flecainide today.    He is advised to begin amiodarone on Monday morning at a dose of 200 mg twice daily for 7 days and subsequently 200 mg once daily.  We are continuing his anticoagulation with Eliquis and his beta-blocker therapy with metoprolol to tartrate 25 mg twice daily.  We will arrange for him to come back after 2 weeks of amiodarone therapy for an ECG.  He should have follow-up complete thyroid function test and liver function tests in 6 months.  If he develops any palpitation episodes in the interim he is advised to call our office and we will consider cardioversion procedure.  As long as he has been consistent with Eliquis use he does not need a DOREEN.  Will consider transthoracic echocardiogram prior to next visit in 6 months time.    Recommend follow-up with electrophysiology but he would like to wait and consider this in the future if needed.    RECOMMENDED ROUTINE MONITORING FOR PATIENTS RECEIVING AMIODARONE  Thyroid function tests--- Baseline and every 6 mo   Electrolytes, serum creatinine --- Baseline and as indicated   Chest radiograph--- Baseline and annually   Pulmonary function tests, with Dlco -- Baseline and for unexplained dyspnea or new chest radiographic findings.  Ophthalmologic evaluation--- If visual impairment or for symptoms   ECG--- Baseline and annually      Orders:    POCT ECG    amiodarone 200 mg tablet; Take 1 tablet (200 mg total) by mouth 2 (two) times a day for 7 days, THEN 1 tablet (200 mg total) daily.    Echo complete w/ contrast if indicated; Future    COMPREHENSIVE METABOLIC PANEL(12); Future    TSH+Free T4; Future    Thoracic aortic ectasia (HCC)  Has mild ectasia of ascending thoracic aorta measuring 4.2 cm.  Blood pressure is well-controlled.  Biggest risk factor for aortic aneurysm is smoking.  Encouraged again to quit smoking completely.       Other specified diabetes mellitus  with hyperglycemia, without long-term current use of insulin (HCC)    Lab Results   Component Value Date    HGBA1C 5.8 (H) 09/06/2024   Has A1c in prediabetes range.  Advising to continue current medications.         Essential hypertension  Blood pressure is well-controlled.  Will continue current medications.       Tobacco dependence due to cigarettes  Has elevated ASCVD risk due to smoking and family history.  Encouraged complete smoking cessation.       Centrilobular emphysema (HCC)  Lungs are clear on examination.  Recent lung scan CT did not identify significant emphysematous changes.           History of Present Illness   HPI  Sushil Harrison is a 69 y.o. male who presents regarding follow-up of his cardiac comorbidities including paroxysmal atrial fibrillation, hypertension, dyslipidemia and tobacco dependence.  He was last seen by me in June 2024.    History obtained from: patient    Today he mentions that since last visit he has had no new diagnoses or hospitalizations or significant illnesses.  From a symptom perspective he denies any recent palpitation episodes or chest pain, shortness of breath or  orthopnea or PND or worsening pedal edema.  Remains active though less during wintertime.    Functional capacity status:  Good   (Excellent- >10 METs; Good: (7-10 METs); Moderate (4-7 METs); Poor (<= 4 METs)     Any chronic stressors:  None   (feeling of poor health, financial problems, and social isolation etc).     Tobacco or alcohol dependence:  He does not drink but continues to smoke about 10 cigarettes a day.     Current cardiac medications:  Amlodipine 5 mg twice daily, lisinopril 20 mg daily, atorvastatin 40 mg daily, apixaban 5 mg twice daily, flecainide 125 mg twice daily, metoprolol tartrate 25 mg twice daily, coenzyme Q 10.     Last recent comprehensive blood work available:  Blood work 1/31/2025:  Sodium 139 potassium 4.1: 6 bicarb 27 BUN 17 creatinine 0.63   Magnesium 2.2    Total  cholesterol 99 triglyceride 49 HDL 29 calculated LDL 61  Hemoglobin A1c 5.8 on 9/6/2024  TSH 0.693    Major cardiac risk factors: Tobacco dependence (Tobacco use, Hypertension, Family history of CHD, Primary severe hypercholesterolemia, DM, multiple major and risk enhancing factors)     Any cardiac clinical risk enhancers from available data: History of PAF (Family history of premature CAD, patient's history of chronic kidney disease, primary hypercholesterolemia, metabolic syndrome, abnormal NIA, inflammatory condition such as RA-HIV-psoriasis, RA-HIV-Psoriasis,, pregnancy related complications such as preeclampsia or premature delivery, early menopause, high risk ethnicity such as [place] , social deprivation, physical inactivity, nonalcoholic fatty liver disease psychosocial stress, major psychiatric illness, atrial fibrillation, left ventricular hypertrophy, obstructive sleep apnea, nonalcoholic fatty liver disease).    ECG: No results found for this visit on 02/06/25.    REVIEW OF SYSTEMS   Positive for: As noted above in HPI  Negative for: All remaining as reviewed below and in HPI.    SYSTEM SYMPTOMS REVIEWED:  General--weight change, fever, night sweats  Respiratory--cough, wheezing, shortness of breath, sputum production  Cardiovascular--chest pain, syncope, dyspnea on exertion, edema, decline in exercise tolerance, claudication   Gastrointestinal--persistent vomiting, diarrhea, abdominal distention, blood in stool   Muscular or skeletal--joint pain or swelling   Neurologic--headaches, syncope, abnormal movement  Hematologic--history of easy bruising and bleeding   Endocrine--thyroid enlargement, heat or cold intolerance, polyuria   Psychiatric--anxiety, depression     CURRENT MEDICATIONS LIST     Current Outpatient Medications:     acyclovir (ZOVIRAX) 800 mg tablet, Take 1 tablet (800 mg total) by mouth 2 (two) times a day (Patient taking differently: Take 800 mg by mouth if needed), Disp: 120  tablet, Rfl: 0    amLODIPine (NORVASC) 5 mg tablet, take 1 tablet by mouth twice a day, Disp: 200 tablet, Rfl: 1    apixaban (Eliquis) 5 mg, Take 1 tablet (5 mg total) by mouth 2 (two) times a day, Disp: 200 tablet, Rfl: 3    atorvastatin (LIPITOR) 40 mg tablet, take 1 tablet by mouth once daily, Disp: 100 tablet, Rfl: 1    Cholecalciferol (VITAMIN D3) 1000 units CAPS, Take 5,000 Units by mouth 3 (three) times a week, Disp: , Rfl:     Coenzyme Q10 (CO Q-10) 200 MG CAPS, Patient states that he takes this on a daily basis. , Disp: , Rfl:     Cyanocobalamin ER 1000 MCG TBCR, 1 daily, Disp: , Rfl:     flecainide (TAMBOCOR) 100 mg tablet, take 1 tablet by mouth every 12 hours, Disp: 180 tablet, Rfl: 3    flecainide (TAMBOCOR) 50 mg tablet, take 1/2 tablet by mouth twice a day IN ADDITION TO 100MG TABLET TWICE A DAY, Disp: 180 tablet, Rfl: 3    fluticasone (FLONASE) 50 mcg/act nasal spray, 2 sprays into each nostril daily, Disp: 9.9 mL, Rfl: 5    ketoconazole (NIZORAL) 2 % cream, Apply topically daily, Disp: 30 g, Rfl: 1    lisinopril (ZESTRIL) 20 mg tablet, Take 1 tablet (20 mg total) by mouth 2 (two) times a day, Disp: 200 tablet, Rfl: 3    LORazepam (ATIVAN) 1 mg tablet, Take 1 tablet (1 mg total) by mouth 2 (two) times a day As needed for anxiety, Disp: 180 tablet, Rfl: 1    metoprolol tartrate (LOPRESSOR) 25 mg tablet, take 1 tablet by mouth twice a day, Disp: 200 tablet, Rfl: 1    Multiple Vitamins-Minerals (MULTIVITAMIN ADULT PO), take 1 by Oral route once, Disp: , Rfl:     selenium 200 mcg, take one tablet daily, Disp: , Rfl:     Sodium Fluoride 5000 Sensitive 1.1-5 % GEL, BRUSH AT NIGHT AS DIRECTED. DO NOT RINSE, EAT OR DRINK AFTER., Disp: , Rfl:     tamsulosin (FLOMAX) 0.4 mg, Take 1 capsule (0.4 mg total) by mouth daily with dinner, Disp: 100 capsule, Rfl: 1    cyclobenzaprine (FLEXERIL) 5 mg tablet, Take 1 tablet at bedtime as needed for muscle spasm of neck (Patient not taking: Reported on 2/6/2025), Disp: 30  tablet, Rfl: 1    levalbuterol (Xopenex HFA) 45 mcg/act inhaler, Inhale 1-2 puffs every 4 (four) hours as needed for wheezing (Patient not taking: Reported on 2/6/2025), Disp: 15 g, Rfl: 0       ALLERGIES     Allergies   Allergen Reactions    No Active Allergies        *-*-*-*-*-*-*-*-*-*-*-*-*-*-*-*-*-*-*-*-*-*-*-*-*-*-*-*-*-*-*-*-*-*-*-*-*-*-*-*-*-*-*-*-*-*-*-*-*-*-*-*-*-*-    Review of Systems       Objective   /60 (BP Location: Left arm, Patient Position: Sitting, Cuff Size: Adult)   Pulse 63   Wt 101 kg (222 lb)   BMI 29.29 kg/m²      Physical Exam

## 2025-02-07 ENCOUNTER — TELEPHONE (OUTPATIENT)
Age: 70
End: 2025-02-07

## 2025-02-07 NOTE — TELEPHONE ENCOUNTER
Patient called asking if he could come into the office for a blood pressure check and to check his home monitor accuracy because he feels that he is getting false high readings. Spoke with Ngoc in clinical who advised to schedule patient for a nurse visit.   Attempted to schedule patient for this.  First available appointment is Tuesday 2/11.  Patient did not wish to schedule this.  Offered appointment with Dr. Luna on Monday 2/10.  Patient also declined this stating that he will call back on Monday.    Patient was seen by cardiology yesterday and BP was 120/60 during that visit.

## 2025-02-10 ENCOUNTER — TELEPHONE (OUTPATIENT)
Age: 70
End: 2025-02-10

## 2025-02-10 NOTE — TELEPHONE ENCOUNTER
Received call from pt asking if any EP physicians come to the [place] office.  Informed him the next available appointment with an EP physician would be in July with Dr Cooper.  Pt states he will call back to schedule once Dr Dietz puts his referral in.

## 2025-02-11 ENCOUNTER — OFFICE VISIT (OUTPATIENT)
Dept: FAMILY MEDICINE CLINIC | Facility: CLINIC | Age: 70
End: 2025-02-11
Payer: COMMERCIAL

## 2025-02-11 VITALS
DIASTOLIC BLOOD PRESSURE: 66 MMHG | HEART RATE: 98 BPM | SYSTOLIC BLOOD PRESSURE: 100 MMHG | OXYGEN SATURATION: 96 % | BODY MASS INDEX: 28.81 KG/M2 | TEMPERATURE: 97 F | WEIGHT: 217.4 LBS | HEIGHT: 73 IN

## 2025-02-11 DIAGNOSIS — R35.1 BPH ASSOCIATED WITH NOCTURIA: ICD-10-CM

## 2025-02-11 DIAGNOSIS — I10 ESSENTIAL HYPERTENSION: ICD-10-CM

## 2025-02-11 DIAGNOSIS — J30.9 ALLERGIC RHINITIS, UNSPECIFIED SEASONALITY, UNSPECIFIED TRIGGER: ICD-10-CM

## 2025-02-11 DIAGNOSIS — J43.2 CENTRILOBULAR EMPHYSEMA (HCC): ICD-10-CM

## 2025-02-11 DIAGNOSIS — J10.1 INFLUENZA A: ICD-10-CM

## 2025-02-11 DIAGNOSIS — N40.1 BPH ASSOCIATED WITH NOCTURIA: ICD-10-CM

## 2025-02-11 DIAGNOSIS — Z79.01 LONG TERM CURRENT USE OF ANTICOAGULANT THERAPY: ICD-10-CM

## 2025-02-11 DIAGNOSIS — R05.1 ACUTE COUGH: Primary | ICD-10-CM

## 2025-02-11 DIAGNOSIS — I48.0 PAROXYSMAL ATRIAL FIBRILLATION (HCC): ICD-10-CM

## 2025-02-11 LAB
SARS-COV-2 AG UPPER RESP QL IA: NEGATIVE
SL AMB POCT RAPID FLU A: POSITIVE
SL AMB POCT RAPID FLU B: NEGATIVE
VALID CONTROL: NORMAL

## 2025-02-11 PROCEDURE — 87804 INFLUENZA ASSAY W/OPTIC: CPT | Performed by: FAMILY MEDICINE

## 2025-02-11 PROCEDURE — 99214 OFFICE O/P EST MOD 30 MIN: CPT | Performed by: FAMILY MEDICINE

## 2025-02-11 PROCEDURE — 87811 SARS-COV-2 COVID19 W/OPTIC: CPT | Performed by: FAMILY MEDICINE

## 2025-02-11 RX ORDER — METHYLPREDNISOLONE 4 MG/1
TABLET ORAL
Qty: 1 EACH | Refills: 0 | Status: SHIPPED | OUTPATIENT
Start: 2025-02-11 | End: 2025-02-17

## 2025-02-11 RX ORDER — OSELTAMIVIR PHOSPHATE 75 MG/1
75 CAPSULE ORAL EVERY 12 HOURS SCHEDULED
Qty: 10 CAPSULE | Refills: 0 | Status: SHIPPED | OUTPATIENT
Start: 2025-02-11 | End: 2025-02-16

## 2025-02-11 RX ORDER — TAMSULOSIN HYDROCHLORIDE 0.4 MG/1
0.4 CAPSULE ORAL
Qty: 100 CAPSULE | Refills: 3 | Status: SHIPPED | OUTPATIENT
Start: 2025-02-11

## 2025-02-11 RX ORDER — BENZONATATE 200 MG/1
200 CAPSULE ORAL 3 TIMES DAILY PRN
Qty: 30 CAPSULE | Refills: 1 | Status: SHIPPED | OUTPATIENT
Start: 2025-02-11 | End: 2025-02-11

## 2025-02-11 RX ORDER — PROMETHAZINE HYDROCHLORIDE 6.25 MG/5ML
SYRUP ORAL
Qty: 120 ML | Refills: 1 | Status: SHIPPED | OUTPATIENT
Start: 2025-02-11

## 2025-02-11 NOTE — ASSESSMENT & PLAN NOTE
Currently in A-fib follows with cardiology has recently changed from flecainide to amiodarone

## 2025-02-11 NOTE — PROGRESS NOTES
"Name: Sushil Harrison      : 1955      MRN: [id number]  Encounter Provider: Herminio Luna DO  Encounter Date: 2025   [facility]  :  Assessment & Plan  Acute cough  Tessalon ordered patient states the Tessalon \"does not work\" will use promethazine drowsiness discussed  Rapid flu, influenza A positive  Rapid COVID-negative  Orders:    POCT Rapid Covid Ag    POCT rapid flu A and B    methylPREDNISolone 4 MG tablet therapy pack; Use as directed on package    promethazine (PHENERGAN) 12.5 mg/10 mL oral solution; Take 1 tsp 4 times daily and 2 tsp at bedtime as needed for cough    BPH associated with nocturia  Stable Flomax was reordered  Orders:    tamsulosin (FLOMAX) 0.4 mg; Take 1 capsule (0.4 mg total) by mouth daily with dinner    Essential hypertension  Stable continue present therapy       Paroxysmal atrial fibrillation (HCC)  Currently in A-fib follows with cardiology has recently changed from flecainide to amiodarone       Long term current use of anticoagulant therapy  Continue Eliquis       Allergic rhinitis, unspecified seasonality, unspecified trigger  Continue Flonase  Orders:    methylPREDNISolone 4 MG tablet therapy pack; Use as directed on package    Centrilobular emphysema (HCC)  Stable lungs are clear  Orders:    methylPREDNISolone 4 MG tablet therapy pack; Use as directed on package    Influenza A  Secondary to significant medical history will start Tamiflu for 5 days  Orders:    methylPREDNISolone 4 MG tablet therapy pack; Use as directed on package    oseltamivir (TAMIFLU) 75 mg capsule; Take 1 capsule (75 mg total) by mouth every 12 (twelve) hours for 5 days    promethazine (PHENERGAN) 12.5 mg/10 mL oral solution; Take 1 tsp 4 times daily and 2 tsp at bedtime as needed for cough           History of Present Illness   Saturday evening to  patient started head congestion sneezing clear rhinorrhea posterior drip mild to moderate dry " "cough.  No fever chills.  Patient did not check for COVID.  he is having increasing sinus pain and pressure since that time      Review of Systems   Constitutional:  Negative for chills and fever.   HENT:          HPI   Eyes: Negative.    Respiratory:          HPI   Cardiovascular:         Seen by cardiology flecainide was stopped amiodarone has started.  Patient is in A-fib cardiology is aware   Gastrointestinal: Negative.    Endocrine: Negative.    Genitourinary: Negative.    Musculoskeletal: Negative.    Skin: Negative.    Allergic/Immunologic: Negative.    Neurological: Negative.    Hematological: Negative.    Psychiatric/Behavioral: Negative.         Objective   /66 (BP Location: Left arm, Patient Position: Sitting, Cuff Size: Standard)   Pulse 98   Temp (!) 97 °F (36.1 °C) (Tympanic)   Ht 6' 1\" (1.854 m)   Wt 98.6 kg (217 lb 6.4 oz)   SpO2 96%   BMI 28.68 kg/m²      Physical Exam  Vitals and nursing note reviewed.   Constitutional:       Appearance: Normal appearance.   HENT:      Head: Normocephalic and atraumatic.      Ears:      Comments: Panic membranes are dull no fluid no injection     Nose:      Comments: Positive allergic turbinate positive pansinus tenderness to percussion     Mouth/Throat:      Comments: Off-white postnasal drip minimal pharyngeal injection negative exudate  Eyes:      General: No scleral icterus.     Conjunctiva/sclera: Conjunctivae normal.   Neck:      Comments: Positive anterior cervical tender adenopathy  Cardiovascular:      Rate and Rhythm: Normal rate. Rhythm irregular.      Heart sounds: Normal heart sounds.   Pulmonary:      Effort: Pulmonary effort is normal.      Breath sounds: Normal breath sounds.   Musculoskeletal:      Cervical back: Neck supple. Tenderness present. No rigidity.   Lymphadenopathy:      Cervical: Cervical adenopathy present.   Skin:     General: Skin is warm and dry.   Neurological:      General: No focal deficit present.      Mental Status: " He is alert.   Psychiatric:         Mood and Affect: Mood normal.

## 2025-02-11 NOTE — ASSESSMENT & PLAN NOTE
Stable lungs are clear  Orders:    methylPREDNISolone 4 MG tablet therapy pack; Use as directed on package

## 2025-02-11 NOTE — ASSESSMENT & PLAN NOTE
Stable Flomax was reordered  Orders:    tamsulosin (FLOMAX) 0.4 mg; Take 1 capsule (0.4 mg total) by mouth daily with dinner

## 2025-02-11 NOTE — PATIENT INSTRUCTIONS
Augmentin twice a day for 10 days take with food  Finish Medrol Dosepak as directed on pack  Use Tessalon every 8 hours as needed for cough  Return in 1 week if still with symptoms

## 2025-02-11 NOTE — ASSESSMENT & PLAN NOTE
Continue Flonase  Orders:    methylPREDNISolone 4 MG tablet therapy pack; Use as directed on package

## 2025-02-17 ENCOUNTER — TELEPHONE (OUTPATIENT)
Age: 70
End: 2025-02-17

## 2025-02-17 DIAGNOSIS — I48.0 PAROXYSMAL ATRIAL FIBRILLATION (HCC): Primary | ICD-10-CM

## 2025-02-17 NOTE — TELEPHONE ENCOUNTER
Pt would like to speak to Dr. Dietz in regard to the scheduling of his cardioversion. Pt feels like this needs to be done this week. Advised if he is really not feeling well he should go to the ER. Advised that someone will be reaching out to schedule him. He then said he needed to be the first one on the schedule. Advised he will work that our with the .

## 2025-02-17 NOTE — TELEPHONE ENCOUNTER
"Secure chat sent to Dr. Dietz. Per Dr. Dietz via secure chat message, \"Okay I will call him.\"  "

## 2025-02-17 NOTE — TELEPHONE ENCOUNTER
Received call from patient requesting a cardioversion be expedited as he has been taking amiodarone 2x daily for a week and is still in afib. He states he is still symptomatic and is worried to wait until his next appointment.

## 2025-02-17 NOTE — TELEPHONE ENCOUNTER
"Per secure chat message from Dr. Dietz, \"I have ordered a cardioversion procedure on him. I left a message on his phone that somebody will call him to schedule. Can we get it done this or next week please. Thanks. Can be done with anybody.\"    Placed call to noninvasive cardiology scheduling dept. No answer. Will try again later.   "

## 2025-02-17 NOTE — TELEPHONE ENCOUNTER
Placed call to noninvasive cardiology dept, spoke with Mony. She will reach out to patient to schedule CV @ [facility].

## 2025-02-17 NOTE — PROGRESS NOTES
Name: Sushil Harrison      : 1955      MRN: [id number]  Encounter Provider: Carlita Dietz MD  Encounter Date: 2025   Encounter department: [facility] CARDIOLOGY [place]    Message received from patient that he has been in atrial fibrillation rhythm since he discontinued flecainide.  flecainide was discontinued because presence of coronary artery calcification and multiple risk factors for CAD.  He has been started on amiodarone therapy.  Recent blood work from 2024 is reviewed.  Electrolytes and renal function are normal.  Thyroid function was normal.    Will schedule for external cardioversion as outpatient.  Will refer to electrophysiologist for discussion regarding A-fib ablation.  Will need continuous periodic surveillance for amiodarone related side effects/toxicities.    Carlita Dietz MD

## 2025-02-18 NOTE — TELEPHONE ENCOUNTER
Pt called again in regards to scheduling cardioversion, he has not heard from anyone. Transf him to [facility] in noninvasive cardiac scheduling to arrange.

## 2025-02-19 ENCOUNTER — HOSPITAL ENCOUNTER (OUTPATIENT)
Dept: NON INVASIVE DIAGNOSTICS | Facility: HOSPITAL | Age: 70
Discharge: HOME | End: 2025-02-19
Payer: COMMERCIAL

## 2025-02-19 ENCOUNTER — ANESTHESIA EVENT (OUTPATIENT)
Dept: NON INVASIVE DIAGNOSTICS | Facility: HOSPITAL | Age: 70
End: 2025-02-19
Payer: COMMERCIAL

## 2025-02-19 ENCOUNTER — ANESTHESIA (OUTPATIENT)
Dept: NON INVASIVE DIAGNOSTICS | Facility: HOSPITAL | Age: 70
End: 2025-02-19
Payer: COMMERCIAL

## 2025-02-19 ENCOUNTER — TELEPHONE (OUTPATIENT)
Dept: CARDIOLOGY CLINIC | Facility: CLINIC | Age: 70
End: 2025-02-19

## 2025-02-19 VITALS
TEMPERATURE: 98.5 F | HEART RATE: 87 BPM | DIASTOLIC BLOOD PRESSURE: 77 MMHG | HEIGHT: 73 IN | SYSTOLIC BLOOD PRESSURE: 124 MMHG | WEIGHT: 215.83 LBS | RESPIRATION RATE: 16 BRPM | OXYGEN SATURATION: 97 % | BODY MASS INDEX: 28.6 KG/M2

## 2025-02-19 DIAGNOSIS — I48.0 PAROXYSMAL ATRIAL FIBRILLATION (HCC): ICD-10-CM

## 2025-02-19 LAB
ATRIAL RATE: 340 BPM
ATRIAL RATE: 76 BPM
P AXIS: 63 DEGREES
PR INTERVAL: 150 MS
QRS AXIS: 11 DEGREES
QRS AXIS: 25 DEGREES
QRSD INTERVAL: 82 MS
QRSD INTERVAL: 86 MS
QT INTERVAL: 328 MS
QT INTERVAL: 386 MS
QTC INTERVAL: 434 MS
QTC INTERVAL: 454 MS
T WAVE AXIS: -23 DEGREES
T WAVE AXIS: 35 DEGREES
VENTRICULAR RATE: 115 BPM
VENTRICULAR RATE: 76 BPM

## 2025-02-19 PROCEDURE — 92960 CARDIOVERSION ELECTRIC EXT: CPT | Performed by: INTERNAL MEDICINE

## 2025-02-19 PROCEDURE — 93010 ELECTROCARDIOGRAM REPORT: CPT | Performed by: INTERNAL MEDICINE

## 2025-02-19 PROCEDURE — 92960 CARDIOVERSION ELECTRIC EXT: CPT

## 2025-02-19 PROCEDURE — 93005 ELECTROCARDIOGRAM TRACING: CPT

## 2025-02-19 RX ORDER — LIDOCAINE HYDROCHLORIDE 20 MG/ML
INJECTION, SOLUTION EPIDURAL; INFILTRATION; INTRACAUDAL; PERINEURAL AS NEEDED
Status: DISCONTINUED | OUTPATIENT
Start: 2025-02-19 | End: 2025-02-19

## 2025-02-19 RX ORDER — SODIUM CHLORIDE 9 MG/ML
INJECTION, SOLUTION INTRAVENOUS CONTINUOUS PRN
Status: DISCONTINUED | OUTPATIENT
Start: 2025-02-19 | End: 2025-02-19

## 2025-02-19 RX ORDER — PROPOFOL 10 MG/ML
INJECTION, EMULSION INTRAVENOUS AS NEEDED
Status: DISCONTINUED | OUTPATIENT
Start: 2025-02-19 | End: 2025-02-19

## 2025-02-19 RX ADMIN — SODIUM CHLORIDE: 0.9 INJECTION, SOLUTION INTRAVENOUS at 09:06

## 2025-02-19 RX ADMIN — PROPOFOL 80 MG: 10 INJECTION, EMULSION INTRAVENOUS at 09:28

## 2025-02-19 RX ADMIN — LIDOCAINE HYDROCHLORIDE 100 MG: 20 INJECTION, SOLUTION EPIDURAL; INFILTRATION; INTRACAUDAL at 09:29

## 2025-02-19 RX ADMIN — PROPOFOL 20 MG: 10 INJECTION, EMULSION INTRAVENOUS at 09:29

## 2025-02-19 NOTE — ANESTHESIA PREPROCEDURE EVALUATION
Procedure:  CARDIOVERSION    Relevant Problems   CARDIO   (+) Elevated coronary artery calcium score   (+) Essential hypertension   (+) Mixed hyperlipidemia   (+) Paroxysmal atrial fibrillation (HCC)   (+) Thoracic aortic ectasia (HCC)      MUSCULOSKELETAL   (+) Osteoarthritis      NEURO/PSYCH   (+) Anxiety      PULMONARY   (+) Centrilobular emphysema (HCC)      Other   (+) Adrenal nodule (HCC)   (+) Long term current use of anticoagulant therapy (Took this AM)   (+) Overweight (BMI 25.0-29.9)        Physical Exam    Airway    Mallampati score: II  TM Distance: <3 FB       Dental   No notable dental hx     Cardiovascular  Rhythm: irregular, Rate: abnormal    Pulmonary   Breath sounds clear to auscultation    Other Findings        Anesthesia Plan  ASA Score- 3     Anesthesia Type- IV sedation with anesthesia with ASA Monitors.         Additional Monitors:     Airway Plan:            Plan Factors-Exercise tolerance (METS): >4 METS.    Chart reviewed.   Existing labs reviewed.     Patient is not a current smoker.      Obstructive sleep apnea risk education given perioperatively.        Induction- intravenous.    Postoperative Plan-         Informed Consent- Anesthetic plan and risks discussed with patient.        NPO Status:  Vitals Value Taken Time   Date of last liquid 02/19/25 02/19/25 0811   Time of last liquid 0700 02/19/25 0811   Date of last solid 02/18/25 02/19/25 0811   Time of last solid 1900 02/19/25 0811

## 2025-02-19 NOTE — ANESTHESIA POSTPROCEDURE EVALUATION
Post-Op Assessment Note    CV Status:  Stable  Pain Score: 0    Pain management: adequate       Mental Status:  Alert and awake   Hydration Status:  Stable and euvolemic   PONV Controlled:  None   Airway Patency:  Patent and adequate     Post Op Vitals Reviewed: Yes    No anethesia notable event occurred.    Staff: CRNA           Last Filed PACU Vitals:  Vitals Value Taken Time   Temp 98.9 °F (37.2 °C) 02/19/25 0945   Pulse 85 02/19/25 0945   /70 02/19/25 0944   Resp 16    SpO2 97 % 02/19/25 0945   Vitals shown include unfiled device data.

## 2025-02-19 NOTE — TELEPHONE ENCOUNTER
Spoke with pt r/s nurs visit for 2/28----- Message from Salvatore PATEL sent at 2/19/2025 11:10 AM EST -----  Regarding: Reschedule nurse visit for 2/27/2025 after 3 PM  Good Morning Everyone,    The above patient contacted the [facility] to reschedule his nurse visit for today.    Patient is requesting the nurse visit be rescheduled to 2/27/2025 after 3 PM    Thank you.

## 2025-02-19 NOTE — ANESTHESIA POSTPROCEDURE EVALUATION
Post-Op Assessment Note    Last Filed PACU Vitals:  Vitals Value Taken Time   Temp 99 °F (37.2 °C) 02/19/25 1014   Pulse 74 02/19/25 1025   /76 02/19/25 1014   Resp 16 02/19/25 1014   SpO2 97 % 02/19/25 1025   Vitals shown include unfiled device data.    Modified Eddie:     Vitals Value Taken Time   Activity 2 02/19/25 1014   Respiration 2 02/19/25 1014   Circulation 2 02/19/25 1014   Consciousness 2 02/19/25 1014   Oxygen Saturation 2 02/19/25 1014     Modified Eddie Score: 10

## 2025-02-28 ENCOUNTER — CLINICAL SUPPORT (OUTPATIENT)
Dept: CARDIOLOGY CLINIC | Facility: CLINIC | Age: 70
End: 2025-02-28
Payer: COMMERCIAL

## 2025-02-28 VITALS
HEIGHT: 73 IN | SYSTOLIC BLOOD PRESSURE: 112 MMHG | BODY MASS INDEX: 28.68 KG/M2 | WEIGHT: 216.4 LBS | DIASTOLIC BLOOD PRESSURE: 62 MMHG

## 2025-02-28 DIAGNOSIS — I48.0 PAROXYSMAL ATRIAL FIBRILLATION (HCC): Primary | ICD-10-CM

## 2025-02-28 PROCEDURE — 99211 OFF/OP EST MAY X REQ PHY/QHP: CPT

## 2025-02-28 PROCEDURE — 93000 ELECTROCARDIOGRAM COMPLETE: CPT

## 2025-02-28 NOTE — PROGRESS NOTES
Sushil presents for EKG/nurse visit after cardioversion on 2/19/25.  Weight and vitals are good.  Medications reviewed and good compliance. He states he checks his watch regularly and has been free of AF and feeling much better off Flecainide and on the Amiodarone.   WT:  216.4  BP: 112/62  HR: 60\    EKG shows NSR and will be signed by Dr Nicholas who is in the office today. EKG will be scanned in chart for review by Dr Dietz.

## 2025-03-07 ENCOUNTER — TELEPHONE (OUTPATIENT)
Age: 70
End: 2025-03-07

## 2025-03-10 DIAGNOSIS — I48.0 PAROXYSMAL ATRIAL FIBRILLATION (HCC): ICD-10-CM

## 2025-03-10 RX ORDER — METOPROLOL TARTRATE 25 MG/1
TABLET, FILM COATED ORAL
Qty: 300 TABLET | Refills: 2 | Status: SHIPPED | OUTPATIENT
Start: 2025-03-10

## 2025-03-10 NOTE — TELEPHONE ENCOUNTER
"Received call from pt asking for a refill of his metoprolol. It is prescribed now as \"25mg bid\". Pt however stated he was told by Dr. Dietz to take \"50mg in the am and 25mg in the pm.\" Advised pt will send a message to the provider to review and advise.    Pt is requesting 300 tablets be sent to the [facility] in [place]. Thanks!   "
Patient calling to check the status of his prescription change request from Friday. Informed him it will be sent to the pharmacy once it is adjusted to reflect the new dosage on our end.   
Universal Safety Interventions

## 2025-03-14 ENCOUNTER — APPOINTMENT (OUTPATIENT)
Dept: LAB | Facility: MEDICAL CENTER | Age: 70
End: 2025-03-14
Payer: COMMERCIAL

## 2025-03-14 ENCOUNTER — TELEPHONE (OUTPATIENT)
Age: 70
End: 2025-03-14

## 2025-03-14 ENCOUNTER — APPOINTMENT (OUTPATIENT)
Dept: LAB | Facility: CLINIC | Age: 70
End: 2025-03-14
Payer: COMMERCIAL

## 2025-03-14 ENCOUNTER — TELEPHONE (OUTPATIENT)
Dept: FAMILY MEDICINE CLINIC | Facility: CLINIC | Age: 70
End: 2025-03-14

## 2025-03-14 DIAGNOSIS — Z79.01 LONG TERM CURRENT USE OF ANTICOAGULANT THERAPY: ICD-10-CM

## 2025-03-14 DIAGNOSIS — R73.9 HYPERGLYCEMIA: ICD-10-CM

## 2025-03-14 DIAGNOSIS — E04.2 MULTIPLE THYROID NODULES: ICD-10-CM

## 2025-03-14 DIAGNOSIS — R82.998 URINE WHITE BLOOD CELLS INCREASED: ICD-10-CM

## 2025-03-14 DIAGNOSIS — J43.2 CENTRILOBULAR EMPHYSEMA (HCC): ICD-10-CM

## 2025-03-14 DIAGNOSIS — F41.9 ANXIETY: ICD-10-CM

## 2025-03-14 DIAGNOSIS — M15.0 PRIMARY OSTEOARTHRITIS INVOLVING MULTIPLE JOINTS: ICD-10-CM

## 2025-03-14 DIAGNOSIS — R82.998 URINE WHITE BLOOD CELLS INCREASED: Primary | ICD-10-CM

## 2025-03-14 DIAGNOSIS — I10 ESSENTIAL HYPERTENSION: ICD-10-CM

## 2025-03-14 DIAGNOSIS — I48.0 PAROXYSMAL ATRIAL FIBRILLATION (HCC): ICD-10-CM

## 2025-03-14 DIAGNOSIS — R93.1 ELEVATED CORONARY ARTERY CALCIUM SCORE: ICD-10-CM

## 2025-03-14 DIAGNOSIS — R97.20 ELEVATED PSA: ICD-10-CM

## 2025-03-14 DIAGNOSIS — F17.210 TOBACCO DEPENDENCE DUE TO CIGARETTES: ICD-10-CM

## 2025-03-14 DIAGNOSIS — N30.00 ACUTE CYSTITIS WITHOUT HEMATURIA: Primary | ICD-10-CM

## 2025-03-14 DIAGNOSIS — D72.829 LEUKOCYTOSIS, UNSPECIFIED TYPE: ICD-10-CM

## 2025-03-14 DIAGNOSIS — N40.1 BPH ASSOCIATED WITH NOCTURIA: ICD-10-CM

## 2025-03-14 DIAGNOSIS — R35.1 BPH ASSOCIATED WITH NOCTURIA: ICD-10-CM

## 2025-03-14 DIAGNOSIS — E78.2 MIXED HYPERLIPIDEMIA: ICD-10-CM

## 2025-03-14 DIAGNOSIS — E27.9 ADRENAL NODULE (HCC): ICD-10-CM

## 2025-03-14 DIAGNOSIS — M89.9 BONE DISORDER: ICD-10-CM

## 2025-03-14 DIAGNOSIS — E66.3 OVERWEIGHT (BMI 25.0-29.9): ICD-10-CM

## 2025-03-14 LAB
25(OH)D3 SERPL-MCNC: 69.5 NG/ML (ref 30–100)
ALBUMIN SERPL BCG-MCNC: 4.2 G/DL (ref 3.5–5)
ALP SERPL-CCNC: 62 U/L (ref 34–104)
ALT SERPL W P-5'-P-CCNC: 14 U/L (ref 7–52)
ANION GAP SERPL CALCULATED.3IONS-SCNC: 9 MMOL/L (ref 4–13)
AST SERPL W P-5'-P-CCNC: 11 U/L (ref 13–39)
BACTERIA UR QL AUTO: ABNORMAL /HPF
BASOPHILS # BLD AUTO: 0.09 THOUSANDS/ÂΜL (ref 0–0.1)
BASOPHILS NFR BLD AUTO: 1 % (ref 0–1)
BILIRUB SERPL-MCNC: 0.74 MG/DL (ref 0.2–1)
BILIRUB UR QL STRIP: NEGATIVE
BUN SERPL-MCNC: 20 MG/DL (ref 5–25)
CALCIUM SERPL-MCNC: 9.2 MG/DL (ref 8.4–10.2)
CAOX CRY URNS QL MICRO: ABNORMAL /HPF
CHLORIDE SERPL-SCNC: 105 MMOL/L (ref 96–108)
CHOLEST SERPL-MCNC: 92 MG/DL (ref ?–200)
CLARITY UR: CLEAR
CO2 SERPL-SCNC: 27 MMOL/L (ref 21–32)
COLOR UR: YELLOW
CREAT SERPL-MCNC: 0.72 MG/DL (ref 0.6–1.3)
EOSINOPHIL # BLD AUTO: 0.18 THOUSAND/ÂΜL (ref 0–0.61)
EOSINOPHIL NFR BLD AUTO: 2 % (ref 0–6)
ERYTHROCYTE [DISTWIDTH] IN BLOOD BY AUTOMATED COUNT: 13.3 % (ref 11.6–15.1)
EST. AVERAGE GLUCOSE BLD GHB EST-MCNC: 128 MG/DL
GFR SERPL CREATININE-BSD FRML MDRD: 95 ML/MIN/1.73SQ M
GLUCOSE P FAST SERPL-MCNC: 99 MG/DL (ref 65–99)
GLUCOSE UR STRIP-MCNC: NEGATIVE MG/DL
HBA1C MFR BLD: 6.1 %
HCT VFR BLD AUTO: 46.9 % (ref 36.5–49.3)
HDLC SERPL-MCNC: 30 MG/DL
HGB BLD-MCNC: 14.9 G/DL (ref 12–17)
HGB UR QL STRIP.AUTO: NEGATIVE
HYALINE CASTS #/AREA URNS LPF: ABNORMAL /LPF
IMM GRANULOCYTES # BLD AUTO: 0.03 THOUSAND/UL (ref 0–0.2)
IMM GRANULOCYTES NFR BLD AUTO: 0 % (ref 0–2)
KETONES UR STRIP-MCNC: NEGATIVE MG/DL
LDLC SERPL CALC-MCNC: 51 MG/DL (ref 0–100)
LEUKOCYTE ESTERASE UR QL STRIP: ABNORMAL
LYMPHOCYTES # BLD AUTO: 1.62 THOUSANDS/ÂΜL (ref 0.6–4.47)
LYMPHOCYTES NFR BLD AUTO: 18 % (ref 14–44)
MCH RBC QN AUTO: 30.5 PG (ref 26.8–34.3)
MCHC RBC AUTO-ENTMCNC: 31.8 G/DL (ref 31.4–37.4)
MCV RBC AUTO: 96 FL (ref 82–98)
MONOCYTES # BLD AUTO: 0.63 THOUSAND/ÂΜL (ref 0.17–1.22)
MONOCYTES NFR BLD AUTO: 7 % (ref 4–12)
MUCOUS THREADS UR QL AUTO: ABNORMAL
NEUTROPHILS # BLD AUTO: 6.3 THOUSANDS/ÂΜL (ref 1.85–7.62)
NEUTS SEG NFR BLD AUTO: 72 % (ref 43–75)
NITRITE UR QL STRIP: NEGATIVE
NON-SQ EPI CELLS URNS QL MICRO: ABNORMAL /HPF
NRBC BLD AUTO-RTO: 0 /100 WBCS
PH UR STRIP.AUTO: 6.5 [PH]
PLATELET # BLD AUTO: 304 THOUSANDS/UL (ref 149–390)
PMV BLD AUTO: 9.8 FL (ref 8.9–12.7)
POTASSIUM SERPL-SCNC: 4.3 MMOL/L (ref 3.5–5.3)
PROT SERPL-MCNC: 7 G/DL (ref 6.4–8.4)
PROT UR STRIP-MCNC: ABNORMAL MG/DL
PSA SERPL-MCNC: 4.17 NG/ML (ref 0–4)
RBC # BLD AUTO: 4.89 MILLION/UL (ref 3.88–5.62)
RBC #/AREA URNS AUTO: ABNORMAL /HPF
SODIUM SERPL-SCNC: 141 MMOL/L (ref 135–147)
SP GR UR STRIP.AUTO: 1.02 (ref 1–1.03)
TRIGL SERPL-MCNC: 53 MG/DL (ref ?–150)
TSH SERPL DL<=0.05 MIU/L-ACNC: 0.79 UIU/ML (ref 0.45–4.5)
UROBILINOGEN UR STRIP-ACNC: 2 MG/DL
WBC # BLD AUTO: 8.85 THOUSAND/UL (ref 4.31–10.16)
WBC #/AREA URNS AUTO: ABNORMAL /HPF

## 2025-03-14 PROCEDURE — 87086 URINE CULTURE/COLONY COUNT: CPT

## 2025-03-14 PROCEDURE — 85025 COMPLETE CBC W/AUTO DIFF WBC: CPT

## 2025-03-14 PROCEDURE — 84153 ASSAY OF PSA TOTAL: CPT

## 2025-03-14 PROCEDURE — 83036 HEMOGLOBIN GLYCOSYLATED A1C: CPT

## 2025-03-14 PROCEDURE — 81001 URINALYSIS AUTO W/SCOPE: CPT

## 2025-03-14 PROCEDURE — 84443 ASSAY THYROID STIM HORMONE: CPT

## 2025-03-14 PROCEDURE — 36415 COLL VENOUS BLD VENIPUNCTURE: CPT

## 2025-03-14 PROCEDURE — 80061 LIPID PANEL: CPT

## 2025-03-14 PROCEDURE — 82306 VITAMIN D 25 HYDROXY: CPT

## 2025-03-14 PROCEDURE — 80053 COMPREHEN METABOLIC PANEL: CPT

## 2025-03-14 RX ORDER — CIPROFLOXACIN 500 MG/1
500 TABLET, FILM COATED ORAL EVERY 24 HOURS
Qty: 7 TABLET | Refills: 0 | Status: SHIPPED | OUTPATIENT
Start: 2025-03-14 | End: 2025-03-17

## 2025-03-14 NOTE — TELEPHONE ENCOUNTER
Patient stated that the pharmacy said the cipro ordered might have a drug reaction. Please call patient

## 2025-03-14 NOTE — TELEPHONE ENCOUNTER
Patient states the he has urine frequency, burning and urgency and is also concerned about PSA levels. Please advise     Patient is requesting a call from Víctor.

## 2025-03-14 NOTE — TELEPHONE ENCOUNTER
Patient calling with concerns about abnormal urinalysis results that he viewed on his MyChart. Informed patient I could send a message to clinical staff. Patient stated he would like to speak to someone about his results. Warm transferred to office clinical to further assist.

## 2025-03-14 NOTE — TELEPHONE ENCOUNTER
Pt is very concerned about his WBC in the urine and him having an enlarged prostate.  Pt will like to know if a provider can review his results.

## 2025-03-14 NOTE — TELEPHONE ENCOUNTER
[facility] with [facility], location patient went to closes at 3 and they're closed tomorrow. They wont know if specimen would still be available. Should we contact patient and have him go to lab for another sample? Please advise

## 2025-03-14 NOTE — TELEPHONE ENCOUNTER
I spoke with the patient directly and advised him.  He will be started on a 7-day course of Levaquin to cover for an acute infection.  A urine culture was ordered for him to give as soon as possible.  He was also referred to urology for follow-up regarding the elevated PSA level.

## 2025-03-15 LAB — BACTERIA UR CULT: NORMAL

## 2025-03-16 ENCOUNTER — RESULTS FOLLOW-UP (OUTPATIENT)
Dept: FAMILY MEDICINE CLINIC | Facility: CLINIC | Age: 70
End: 2025-03-16

## 2025-03-20 ENCOUNTER — TELEPHONE (OUTPATIENT)
Dept: ADMINISTRATIVE | Facility: OTHER | Age: 70
End: 2025-03-20

## 2025-03-20 NOTE — TELEPHONE ENCOUNTER
03/20/25 12:41 PM    Patient contacted to bring Advance Directive, POLST, or Living Will document to next scheduled pcp visit.[facility] Department left message.    Thank you.  Mary Beth Rodríguez MA  [facility]

## 2025-03-21 ENCOUNTER — OFFICE VISIT (OUTPATIENT)
Dept: FAMILY MEDICINE CLINIC | Facility: CLINIC | Age: 70
End: 2025-03-21
Payer: COMMERCIAL

## 2025-03-21 ENCOUNTER — TELEPHONE (OUTPATIENT)
Age: 70
End: 2025-03-21

## 2025-03-21 VITALS
SYSTOLIC BLOOD PRESSURE: 122 MMHG | WEIGHT: 216 LBS | BODY MASS INDEX: 28.63 KG/M2 | HEART RATE: 65 BPM | RESPIRATION RATE: 20 BRPM | HEIGHT: 73 IN | OXYGEN SATURATION: 97 % | DIASTOLIC BLOOD PRESSURE: 76 MMHG

## 2025-03-21 DIAGNOSIS — E27.9 ADRENAL NODULE (HCC): ICD-10-CM

## 2025-03-21 DIAGNOSIS — R97.20 BPH WITH ELEVATED PSA: ICD-10-CM

## 2025-03-21 DIAGNOSIS — J43.2 CENTRILOBULAR EMPHYSEMA (HCC): ICD-10-CM

## 2025-03-21 DIAGNOSIS — N40.0 BPH WITH ELEVATED PSA: ICD-10-CM

## 2025-03-21 DIAGNOSIS — I77.810 THORACIC AORTIC ECTASIA (HCC): ICD-10-CM

## 2025-03-21 DIAGNOSIS — R73.9 HYPERGLYCEMIA: ICD-10-CM

## 2025-03-21 DIAGNOSIS — E04.2 MULTIPLE THYROID NODULES: ICD-10-CM

## 2025-03-21 DIAGNOSIS — F17.210 TOBACCO DEPENDENCE DUE TO CIGARETTES: Primary | ICD-10-CM

## 2025-03-21 DIAGNOSIS — I48.0 PAROXYSMAL ATRIAL FIBRILLATION (HCC): ICD-10-CM

## 2025-03-21 DIAGNOSIS — E78.2 MIXED HYPERLIPIDEMIA: ICD-10-CM

## 2025-03-21 PROBLEM — N40.1 BPH ASSOCIATED WITH NOCTURIA: Status: RESOLVED | Noted: 2023-09-11 | Resolved: 2025-03-21

## 2025-03-21 PROBLEM — R35.1 BPH ASSOCIATED WITH NOCTURIA: Status: RESOLVED | Noted: 2023-09-11 | Resolved: 2025-03-21

## 2025-03-21 PROCEDURE — 99214 OFFICE O/P EST MOD 30 MIN: CPT | Performed by: FAMILY MEDICINE

## 2025-03-21 NOTE — ASSESSMENT & PLAN NOTE
Status post cardioversion to/25 in NSR by auscultation today follows with cardiology  Orders:  •  CBC; Future  •  Comprehensive metabolic panel; Future  •  Hemoglobin A1C; Future  •  Lipid Panel with Direct LDL reflex; Future  •  TSH, 3rd generation with Free T4 reflex; Future  •  UA (URINE) with reflex to Scope; Future  •  PSA, total and free; Future

## 2025-03-21 NOTE — ASSESSMENT & PLAN NOTE
TSH is normal follows with endocrinology  Orders:  •  CBC; Future  •  Comprehensive metabolic panel; Future  •  Hemoglobin A1C; Future  •  Lipid Panel with Direct LDL reflex; Future  •  TSH, 3rd generation with Free T4 reflex; Future  •  UA (URINE) with reflex to Scope; Future  •  PSA, total and free; Future

## 2025-03-21 NOTE — ASSESSMENT & PLAN NOTE
Patient states he was called by urology department he will call back and set up an appointment  Orders:  •  CBC; Future  •  Comprehensive metabolic panel; Future  •  Hemoglobin A1C; Future  •  Lipid Panel with Direct LDL reflex; Future  •  TSH, 3rd generation with Free T4 reflex; Future  •  UA (URINE) with reflex to Scope; Future  •  PSA, total and free; Future

## 2025-03-21 NOTE — TELEPHONE ENCOUNTER
New Patient    Appointment Scheduling  What office location does the patient prefer?: [place]    What is the reason for the patient's appointment?: Slightly Elevated PSA    Have patient records been requested?: No  If No, are the records showing in Epic: Yes    Appointment Details  Date: 4/9/2025  Time:    10 am  Location:   [place]  Provider:  Yessenia WARD  Does the appointment need further review? (Reason) N/A      HISTORY  Is the patient having active symptoms? If so, describe symptoms: not mentioned, just a slightly elevated psa blood work    Has the patient had any previous Urologist(s)?: No    Was the patient seen in the ED?: No    Has the patient had any outside testing done?: No    Does patient have Imaging/Lab Results: Yes, Recent PSA in chart    Have you had Cancer Unknown    INSURANCE   Have you confirmed Patient's insurance? yes  Is the insurance accepted?  yes  Is the insurance active? yes

## 2025-03-21 NOTE — PROGRESS NOTES
Name: Sushil Harrison      : 1955      MRN: [id number]  Encounter Provider: Herminio Luna DO  Encounter Date: 3/21/2025   Encounter department: [facility] PRIMARY CARE.  Return in 6 months for office visit, blood work, and AWV  :  Assessment & Plan  Tobacco dependence due to cigarettes  Up to date with screening, complete cessation recommended  Orders:  •  CBC; Future  •  Comprehensive metabolic panel; Future  •  Hemoglobin A1C; Future  •  Lipid Panel with Direct LDL reflex; Future  •  TSH, 3rd generation with Free T4 reflex; Future  •  UA (URINE) with reflex to Scope; Future  •  PSA, total and free; Future    BPH with elevated PSA  Patient states he was called by urology department he will call back and set up an appointment  Orders:  •  CBC; Future  •  Comprehensive metabolic panel; Future  •  Hemoglobin A1C; Future  •  Lipid Panel with Direct LDL reflex; Future  •  TSH, 3rd generation with Free T4 reflex; Future  •  UA (URINE) with reflex to Scope; Future  •  PSA, total and free; Future    Paroxysmal atrial fibrillation (HCC)  Status post cardioversion to25 in NSR by auscultation today follows with cardiology  Orders:  •  CBC; Future  •  Comprehensive metabolic panel; Future  •  Hemoglobin A1C; Future  •  Lipid Panel with Direct LDL reflex; Future  •  TSH, 3rd generation with Free T4 reflex; Future  •  UA (URINE) with reflex to Scope; Future  •  PSA, total and free; Future    Thoracic aortic ectasia (HCC)  Will be due for repeat CT in December  Orders:  •  CBC; Future  •  Comprehensive metabolic panel; Future  •  Hemoglobin A1C; Future  •  Lipid Panel with Direct LDL reflex; Future  •  TSH, 3rd generation with Free T4 reflex; Future  •  UA (URINE) with reflex to Scope; Future  •  PSA, total and free; Future    Centrilobular emphysema (HCC)  Stable, lungs are clear  Orders:  •  CBC; Future  •  Comprehensive metabolic panel; Future  •  Hemoglobin A1C; Future  •  Lipid Panel with Direct LDL  reflex; Future  •  TSH, 3rd generation with Free T4 reflex; Future  •  UA (URINE) with reflex to Scope; Future  •  PSA, total and free; Future    Multiple thyroid nodules  TSH is normal follows with endocrinology  Orders:  •  CBC; Future  •  Comprehensive metabolic panel; Future  •  Hemoglobin A1C; Future  •  Lipid Panel with Direct LDL reflex; Future  •  TSH, 3rd generation with Free T4 reflex; Future  •  UA (URINE) with reflex to Scope; Future  •  PSA, total and free; Future    Adrenal nodule (HCC)  Stable follows with endocrinology  Orders:  •  CBC; Future  •  Comprehensive metabolic panel; Future  •  Hemoglobin A1C; Future  •  Lipid Panel with Direct LDL reflex; Future  •  TSH, 3rd generation with Free T4 reflex; Future  •  UA (URINE) with reflex to Scope; Future  •  PSA, total and free; Future    Hyperglycemia  Controlled continue to monitor  Orders:  •  CBC; Future  •  Comprehensive metabolic panel; Future  •  Hemoglobin A1C; Future  •  Lipid Panel with Direct LDL reflex; Future  •  TSH, 3rd generation with Free T4 reflex; Future  •  UA (URINE) with reflex to Scope; Future  •  PSA, total and free; Future    Mixed hyperlipidemia  Stable continue atorvastatin 40 mg daily  Orders:  •  CBC; Future  •  Comprehensive metabolic panel; Future  •  Hemoglobin A1C; Future  •  Lipid Panel with Direct LDL reflex; Future  •  TSH, 3rd generation with Free T4 reflex; Future  •  UA (URINE) with reflex to Scope; Future  •  PSA, total and free; Future          Depression Screening and Follow-up Plan: Patient was screened for depression during today's encounter. They screened negative with a PHQ-2 score of 0.        History of Present Illness   Blood work was discussed he did have some white cells in his urine treated with Cipro symptom-free at the present time however patient's PSA is mildly over 4-4.17.  He was referred to urology by rosalinda.  Full blood work discussed      Review of Systems   Constitutional: Negative.    HENT:  "Negative.     Eyes: Negative.    Respiratory: Negative.     Cardiovascular:         Status post cardioversion 2/25   Gastrointestinal: Negative.    Endocrine: Negative.    Genitourinary:         HPI   Musculoskeletal: Negative.    Skin: Negative.    Allergic/Immunologic: Negative.    Neurological: Negative.    Hematological: Negative.    Psychiatric/Behavioral: Negative.         Objective   /76 (BP Location: Right arm, Patient Position: Sitting, Cuff Size: Large)   Pulse 65   Resp 20   Ht 6' 1\" (1.854 m)   Wt 98 kg (216 lb)   SpO2 97%   BMI 28.50 kg/m²      Physical Exam  Vitals and nursing note reviewed.   Constitutional:       Appearance: Normal appearance.   HENT:      Head: Normocephalic and atraumatic.      Mouth/Throat:      Mouth: Mucous membranes are moist.   Eyes:      General: No scleral icterus.  Neck:      Vascular: No carotid bruit.   Cardiovascular:      Rate and Rhythm: Normal rate and regular rhythm.      Heart sounds: Normal heart sounds.   Pulmonary:      Effort: Pulmonary effort is normal.      Breath sounds: Normal breath sounds.   Abdominal:      General: Bowel sounds are normal.      Palpations: Abdomen is soft.      Tenderness: There is no abdominal tenderness.   Musculoskeletal:      Cervical back: Neck supple.      Right lower leg: No edema.      Left lower leg: No edema.   Skin:     General: Skin is warm and dry.   Neurological:      General: No focal deficit present.      Mental Status: He is alert.   Psychiatric:         Mood and Affect: Mood normal.         "

## 2025-03-21 NOTE — ASSESSMENT & PLAN NOTE
Up to date with screening, complete cessation recommended  Orders:  •  CBC; Future  •  Comprehensive metabolic panel; Future  •  Hemoglobin A1C; Future  •  Lipid Panel with Direct LDL reflex; Future  •  TSH, 3rd generation with Free T4 reflex; Future  •  UA (URINE) with reflex to Scope; Future  •  PSA, total and free; Future

## 2025-03-21 NOTE — ASSESSMENT & PLAN NOTE
Stable follows with endocrinology  Orders:  •  CBC; Future  •  Comprehensive metabolic panel; Future  •  Hemoglobin A1C; Future  •  Lipid Panel with Direct LDL reflex; Future  •  TSH, 3rd generation with Free T4 reflex; Future  •  UA (URINE) with reflex to Scope; Future  •  PSA, total and free; Future

## 2025-03-21 NOTE — ASSESSMENT & PLAN NOTE
Controlled continue to monitor  Orders:  •  CBC; Future  •  Comprehensive metabolic panel; Future  •  Hemoglobin A1C; Future  •  Lipid Panel with Direct LDL reflex; Future  •  TSH, 3rd generation with Free T4 reflex; Future  •  UA (URINE) with reflex to Scope; Future  •  PSA, total and free; Future

## 2025-03-21 NOTE — ASSESSMENT & PLAN NOTE
Will be due for repeat CT in December  Orders:  •  CBC; Future  •  Comprehensive metabolic panel; Future  •  Hemoglobin A1C; Future  •  Lipid Panel with Direct LDL reflex; Future  •  TSH, 3rd generation with Free T4 reflex; Future  •  UA (URINE) with reflex to Scope; Future  •  PSA, total and free; Future

## 2025-03-21 NOTE — PATIENT INSTRUCTIONS
I recommend complete tobacco cessation  Follow with all specialist further instructions  Specifically follow with urology for elevated PSA  Return in 6 months for office visit, blood work, and AWV

## 2025-03-21 NOTE — ASSESSMENT & PLAN NOTE
Stable, lungs are clear  Orders:  •  CBC; Future  •  Comprehensive metabolic panel; Future  •  Hemoglobin A1C; Future  •  Lipid Panel with Direct LDL reflex; Future  •  TSH, 3rd generation with Free T4 reflex; Future  •  UA (URINE) with reflex to Scope; Future  •  PSA, total and free; Future

## 2025-03-21 NOTE — ASSESSMENT & PLAN NOTE
Stable continue atorvastatin 40 mg daily  Orders:  •  CBC; Future  •  Comprehensive metabolic panel; Future  •  Hemoglobin A1C; Future  •  Lipid Panel with Direct LDL reflex; Future  •  TSH, 3rd generation with Free T4 reflex; Future  •  UA (URINE) with reflex to Scope; Future  •  PSA, total and free; Future

## 2025-04-08 NOTE — PROGRESS NOTES
4/9/2025      Chief Complaint   Patient presents with   • New Patient Visit   • Elevated PSA         Assessment and Plan    69 y.o. male managed by New patient       1. Elevated PSA  Assessment & Plan:  Most resent PSA eleavted   family history- denies   BRIDGET- no firmess or nodules noted on exam today  Plan to repeat PSA in about 1-2 months   Discussed increase chance in abnormality when PSA is over 4   If remains over 4 I recommend we move forward with a multiparametric MRI for further evaluation.  We additionally discussed that this is utilized as a screening tool.  A biopsy of the prostate is the only definitive way to diagnose prostate cancer.  PI-RADS category discussed with patient.  Patient understands if PI-RADS category is concerning that a biopsy would be the next step.   Will call once PSA results      Lab Results   Component Value Date    PSA 4.174 (H) 03/14/2025    PSA 3.967 09/06/2024    PSA 2.70 09/06/2023     Orders:  -     Ambulatory Referral to Urology  -     PSA Total, Diagnostic; Future; Expected date: 06/10/2025        History of Present Illness  Sushil Harrison is a 69 y.o. male here for evaluation of elevated PSA.  Patient was having PSA checked through PCP.  Most recent PSA is elevated at 4.1.  He is currently on Flomax for BPH symptoms.  Urine was checked 3/14 urine  culture was negative for infection.  White blood cells were noted on micro.  Patient was treated with Cipro.  He denies any UTI symptoms.  He reports being content with urination while on the Flomax.  He feels that he adequately empties his bladder.  He denies flank pain or gross hematuria.    Denies family history of prostate cancer    Patient does have adrenal nodule which is stable and follows with endocrinology.    Review of Systems   Constitutional:  Negative for chills and fever.   HENT:  Negative for ear pain and sore throat.    Eyes:  Negative for pain and visual disturbance.   Respiratory:  Negative for cough and shortness  "of breath.    Cardiovascular:  Negative for chest pain and palpitations.   Gastrointestinal:  Negative for abdominal pain and vomiting.   Genitourinary:  Negative for decreased urine volume, difficulty urinating, dysuria, flank pain, frequency, hematuria and urgency.   Musculoskeletal:  Negative for arthralgias and back pain.   Skin:  Negative for color change and rash.   Neurological:  Negative for seizures and syncope.   All other systems reviewed and are negative.               Vitals  Vitals:    04/09/25 1004   BP: 124/82   BP Location: Left arm   Patient Position: Sitting   Cuff Size: Adult   Pulse: 67   SpO2: 95%   Weight: 98 kg (216 lb)   Height: 6' 1\" (1.854 m)       Physical Exam  Vitals reviewed.   Constitutional:       Appearance: Normal appearance.   HENT:      Head: Normocephalic and atraumatic.   Eyes:      Conjunctiva/sclera: Conjunctivae normal.   Pulmonary:      Effort: Pulmonary effort is normal.   Abdominal:      General: Abdomen is flat. There is no distension.      Palpations: Abdomen is soft.      Tenderness: There is no abdominal tenderness.   Genitourinary:     Penis: Normal.       Testes: Normal.      Prostate: Enlarged. Not tender and no nodules present.   Musculoskeletal:         General: Normal range of motion.      Cervical back: Normal range of motion.   Skin:     General: Skin is warm and dry.   Neurological:      General: No focal deficit present.      Mental Status: He is alert and oriented to person, place, and time.   Psychiatric:         Mood and Affect: Mood normal.         Behavior: Behavior normal.         Thought Content: Thought content normal.         Judgment: Judgment normal.           Past History  Past Medical History:   Diagnosis Date   • Atrial fibrillation (HCC) 06/26/2015   • Colon polyp    • HLD (hyperlipidemia)    • HTN (hypertension)    • Hypertension    • Multiple thyroid nodules    • Positive FIT (fecal immunochemical test)    • Pre-diabetes    • Vitamin D " deficiency      Social History     Socioeconomic History   • Marital status: /Civil Union     Spouse name: None   • Number of children: None   • Years of education: None   • Highest education level: None   Occupational History   • Occupation: retired   Tobacco Use   • Smoking status: Every Day     Current packs/day: 0.50     Average packs/day: 0.5 packs/day for 57.3 years (28.6 ttl pk-yrs)     Types: Cigarettes     Start date: 1968   • Smokeless tobacco: Never   • Tobacco comments:     Patient smokes half pack a day now, however he used to smoke a pack a day earlier; last smoked 2/18/25   Vaping Use   • Vaping status: Never Used   Substance and Sexual Activity   • Alcohol use: Not Currently   • Drug use: Never   • Sexual activity: Yes     Partners: Female     Birth control/protection: None   Other Topics Concern   • None   Social History Narrative   • None     Social Drivers of Health     Financial Resource Strain: Low Risk  (9/11/2023)    Overall Financial Resource Strain (CARDIA)    • Difficulty of Paying Living Expenses: Not hard at all   Food Insecurity: No Food Insecurity (9/13/2024)    Nursing - Inadequate Food Risk Classification    • Worried About Running Out of Food in the Last Year: Never true    • Ran Out of Food in the Last Year: Never true    • Ran Out of Food in the Last Year: Not on file   Transportation Needs: No Transportation Needs (9/13/2024)    PRAPARE - Transportation    • Lack of Transportation (Medical): No    • Lack of Transportation (Non-Medical): No   Physical Activity: Not on file   Stress: Not on file   Social Connections: Not on file   Intimate Partner Violence: Not on file   Housing Stability: Low Risk  (9/13/2024)    Housing Stability Vital Sign    • Unable to Pay for Housing in the Last Year: No    • Number of Times Moved in the Last Year: 0    • Homeless in the Last Year: No     Social History     Tobacco Use   Smoking Status Every Day   • Current packs/day: 0.50   • Average  packs/day: 0.5 packs/day for 57.3 years (28.6 ttl pk-yrs)   • Types: Cigarettes   • Start date: 1968   Smokeless Tobacco Never   Tobacco Comments    Patient smokes half pack a day now, however he used to smoke a pack a day earlier; last smoked 2/18/25     Family History   Problem Relation Age of Onset   • Heart attack Father 60        MI   • Heart attack Paternal Uncle         MI       The following portions of the patient's history were reviewed and updated as appropriate: allergies, current medications, past medical history, past social history, past surgical history and problem list.    Results  No results found for this or any previous visit (from the past hour).]  Lab Results   Component Value Date    PSA 4.174 (H) 03/14/2025    PSA 3.967 09/06/2024    PSA 2.70 09/06/2023    PSA 3.1 08/29/2022     Lab Results   Component Value Date    CALCIUM 9.2 03/14/2025    K 4.3 03/14/2025    CO2 27 03/14/2025     03/14/2025    BUN 20 03/14/2025    CREATININE 0.72 03/14/2025     Lab Results   Component Value Date    WBC 8.85 03/14/2025    HGB 14.9 03/14/2025    HCT 46.9 03/14/2025    MCV 96 03/14/2025     03/14/2025

## 2025-04-09 ENCOUNTER — CONSULT (OUTPATIENT)
Dept: UROLOGY | Facility: CLINIC | Age: 70
End: 2025-04-09

## 2025-04-09 VITALS
HEIGHT: 73 IN | HEART RATE: 67 BPM | DIASTOLIC BLOOD PRESSURE: 82 MMHG | OXYGEN SATURATION: 95 % | BODY MASS INDEX: 28.63 KG/M2 | SYSTOLIC BLOOD PRESSURE: 124 MMHG | WEIGHT: 216 LBS

## 2025-04-09 DIAGNOSIS — R97.20 ELEVATED PSA: Primary | ICD-10-CM

## 2025-04-09 NOTE — ASSESSMENT & PLAN NOTE
Most resent PSA eleavted   family history- denies   BRIDGET- no firmess or nodules noted on exam today  Plan to repeat PSA in about 1-2 months   Discussed increase chance in abnormality when PSA is over 4   If remains over 4 I recommend we move forward with a multiparametric MRI for further evaluation.  We additionally discussed that this is utilized as a screening tool.  A biopsy of the prostate is the only definitive way to diagnose prostate cancer.  PI-RADS category discussed with patient.  Patient understands if PI-RADS category is concerning that a biopsy would be the next step.   Will call once PSA results      Lab Results   Component Value Date    PSA 4.174 (H) 03/14/2025    PSA 3.967 09/06/2024    PSA 2.70 09/06/2023

## 2025-04-22 NOTE — PATIENT INSTRUCTIONS
April 24, 2025       Adriel Prescott MD  [address] [address]  Via In Basket      Patient: Lobito Noel Jr.   YOB: 1950   Date of Visit: 4/22/2025       Dear Dr. Prescott:    I saw your patient, Lobito Noel, for an evaluation. Below are my notes for this visit with him.    If you have questions, please do not hesitate to call me.      Sincerely,        Yareli Brunson MD        CC: No Recipients  Yareli Brunson MD  4/24/2025  9:53 AM  Signed                DATE: 4/22/2025  PATIENT: Lobito Noel  YOB: 1950  MRN: [id number]  Referred By: Adriel Prescott MD    CHIEF COMPLAINT:  Chief Complaint   Patient presents with   • Follow-up     MGUS (monoclonal gammopathy of unknown significance)       HISTORY OF PRESENT ILLNESS:  74M w/ PMH of IgG kappa paraproteinemia who presents for follow up.    - Feels like he is doing well, no new issues or concerns.   - No new symptoms or issues.   - Had the spine surgery (POD1 L4-5 laminectomy with resection of left synovial cyst (10/24/24) on 10/24/24.   - Did have a lung cancer screening CT (5/13/24) that showed a 1.3 cm irregular spiculated nodule in the R apex and a 4 mm RUL nodule.Per note from 5/24/24, a 3 month CT Chest without contrast was recommended in 3 months (8/24/24). Which showed stable appearance of nodule. Pulm called about an EBUS but patient wasn't ready yet with everything with his back going on.     -------------------------------------  HEM/ONC HISTORY:  1) Smoldering myeloma   - Found to have elevated total protein labs w/ PCP.   - SPEP showed IgG kappa monoclonal protein of 1.7. K/L ratio was 5.59. No CKD, hypercalcemia or significant anemia.   - PET (11/22/22) was negative.   - BmBx (10/20/22) w/ plasma cells 8-10%, kappa light chain restricted.    - Abnormal male karyotype in 13 out of 20 cells analyzed from a cultured bone marrow specimen demonstrating a loss of the Y chromosome and a gain of chromosome 14.  CARDIOLOGY ASSESSMENT & PLAN     1  Paroxysmal atrial fibrillation (HCC)  POCT ECG    CT coronary calcium score    flecainide (TAMBOCOR) 100 mg tablet      2  Mixed hyperlipidemia        3  Essential hypertension        4  Long term current use of anticoagulant therapy        5  Tobacco dependence due to cigarettes          Paroxysmal atrial fibrillation [facility])  Mr Hilary Arevalo continues to maintain sinus rhythm  He does not give any symptoms that suggest recurrence of atrial fibrillation in the recent months  He is on antiarrhythmic therapy with flecainide  He has no known coronary artery disease however he has several risk factors for CAD  Physical examination does not reveal any signs of pulmonary or peripheral vascular congestion  His lipids are relatively well controlled  He is on moderate intensity statin therapy  His current 10-year ASCVD risk is around 20% putting him in high risk category  He has not yet undergone screening nuclear stress test for ischemic heart disease as he has some reservations about being exposed to radiation and about the possible side effects of regadenoson  Today I tried to reassure him that this test is relatively safe and I do not see any contraindication for him to undergo the test   He is going to consider this  -- At this time I am advising him to continue current medications  -- Considering his risk factors and have him not proceeding with nuclear stress test I am requesting for a coronary calcium score test to determine his risk category  -- I have again strongly urged him to quit smoking as this is a single major risk factor for future cardiac and cerebrovascular disease  -- Dietary and medical compliance are reinforced  -- He is advised  to report any concerning symptoms such as chest pain, shortness of breath, decline in exercise tolerance or presyncope/syncope     - Abnormal FISH result for 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  - Due rising m-protein, repeat PET (1/22/25) negative and BmBx (1/28/25) w/ 10-20% plasma cells. No CRAB criteria.     2)) Prostate cancer (2010) s/p RT. Thinks that he got hormone therapy but not sure of details. No surgery or chemo.     PAST MEDICAL HISTORY:   Past Medical History:   Diagnosis Date   • Anemia    • DM (diabetes mellitus)  (CMD)    • Eczema    • Essential hypertension 03/19/2019   • Fracture    • History of GI diverticular bleed 12/14/2020   • Hyperlipidemia    • Lung nodule 05/27/2024    CT in May 2024 that showed a right lung spiculated nodule.  This was discussed at tumor board with recommendations of follow-up imaging in August 2024. PET scan in July 2024 was unrevealing, no concerning FDG uptake     • Prostate cancer  (CMD)    • PVD (peripheral vascular disease) (CMD)    • Type 2 diabetes mellitus, without long-term current use of insulin  (CMD) 03/19/2019       PAST SURGICAL HISTORY:   Past Surgical History:   Procedure Laterality Date   • Cardiac catherization     • Laminectomy,lumbar N/A 10/24/2024    LUMBAR 4 - LUMBAR 5 LAMINECTOMY, RESECTION OF LEFT SYNOVIAL CYST   • Peripheral vascular intervention     • Transurethral elec-surg prostatectom  2010       FAMILY MEDICAL HISTORY:   Family History   Problem Relation Age of Onset   • Diabetes Mother    • Birth defects Sister    - Mom w/ lung cancer.     SOCIAL HISTORY:   Social Connections: Low Risk  (11/18/2024)    Social Connections    • Social Connectivity: 5 or more times a week   Recent Concern: Social Connections - Medium Risk (9/4/2024)    Social Connections    • Social Connectivity: 1 or 2 times a week   - Retired .   - , no children.   - [place] vet.   - Prior tobacco use, quit years ago, unsure ago.   - No heavy alcohol use.   - No drug use.     MEDICATIONS:   Current Outpatient Medications   Medication Sig   •  losartan (COZAAR) 25 MG tablet Take 1 tablet by mouth daily.   • Aspirin 81 MG Cap Take 1 capsule by mouth daily.   • triamcinolone (ARISTOCORT) 0.1 % cream APPLY THIN LAYER TOPICALLY TO THE AFFECTED AREA TWICE DAILY   • empagliflozin (Jardiance) 10 MG tablet Take 5 mg by mouth daily (before breakfast).   • Multiple Vitamins-Minerals (vitamin - therapeutic multivitamins w/minerals) tablet Take 1 tablet by mouth daily.   • atorvastatin (LIPITOR) 40 MG tablet Take 1 tablet by mouth daily.     No current facility-administered medications for this visit.       ALLERGIES:  ALLERGIES:  No Known Allergies    GEN: NAD, well-appearing   HEENT: NCAT, MMM, no oral lesions   PULM: CTAB, no increased WOB   CV: RRR, no LE edema   ABD: S/NT/ND, no guarding or rebound, no hepatosplenomegaly   NEURO: AO, no gross deficits   SKIN: No rashes or lesions   PSYCH: Appropriate     DATA:  I have independently reviewed the most recent imaging, pathology, and labs including BMP, CBC, and HFP. Pertinents as noted in cancer history or below.     ASSESSMENT/PLAN:  73M w/ PMH of IgG kappa paraproteinemia who presents for follow up.     # IgG kappa moldering myeloma  - Due to rising m-protein, we repeated myeloma eval to assess for PD. PET (1/22/25) negative and BmBx (1/28/25) w/ 10-20% plasma cells. No CRAB criteria. Given this, consistent w/ intermediate risk smoldering myeloma. We reviewed diagnosis and that while high risk smoldering myeloma does have systemic therapy options, observation is still recommended for intermediate risk.   - Plasma cell profile w/ slightly up-trend in M-protein and FLC ratio, but no CRAB criteria and still intermediate risk SM.   - Repeat CMP/CBC/plasma profile, 24 hour UPEP q3 months.     # R lung spiculated nodule   - Noted on CT (5/13/24) but per note, discussed at thoracic tumor board conference and recommended repeat imaging in 8/2024. While this imaging showed stable, spiculated lesion, would consider biopsy  to rule out malignancy.  - Refered to Pulm for consideration of EBUS and they have reached out to patient to schedule EBUS, but he wanted to defer at this time and considering monitoring given the PET was not avid. He is aware of concern for malignancy and that PET/CT cannot  definitively rule out cancer.   - Will plan to repeat CT Chest w/ IV contrast.     Yareli Brunson MD  Hematology Oncology   [facility]

## 2025-06-09 ENCOUNTER — APPOINTMENT (OUTPATIENT)
Dept: LAB | Facility: CLINIC | Age: 70
End: 2025-06-09
Payer: COMMERCIAL

## 2025-06-09 DIAGNOSIS — R97.20 ELEVATED PSA: ICD-10-CM

## 2025-06-09 LAB — PSA SERPL-MCNC: 4.35 NG/ML (ref 0–4)

## 2025-06-09 PROCEDURE — 36415 COLL VENOUS BLD VENIPUNCTURE: CPT

## 2025-06-09 PROCEDURE — 84153 ASSAY OF PSA TOTAL: CPT

## 2025-06-11 ENCOUNTER — RESULTS FOLLOW-UP (OUTPATIENT)
Dept: UROLOGY | Facility: CLINIC | Age: 70
End: 2025-06-11

## 2025-06-11 DIAGNOSIS — R97.20 ELEVATED PSA: Primary | ICD-10-CM

## 2025-06-11 NOTE — TELEPHONE ENCOUNTER
Contacted patient and made him aware of PSA results and ELIZABET Casas's recommendations for mpMRI prostate with BMP prior. Patient was provided with the central scheduling phone number. He states he will check his schedule for the summer and call to schedule at his convenience. He is aware office will call with the results.

## 2025-06-11 NOTE — TELEPHONE ENCOUNTER
----- Message from ELIZABET Pathak sent at 6/11/2025  7:26 AM EDT -----  PSA level continues to rise.  I recommend we proceed with multiparametric MRI to further evaluate.  Order placed.  Please assist with scheduling.  Will call patient with results sent.  ----- Message -----  From: Lab, Background User  Sent: 6/9/2025   1:45 PM EDT  To: ELIZABET Lira

## 2025-06-11 NOTE — TELEPHONE ENCOUNTER
Returned call to patient who states he saw the order for the MRI on his MyChart, but did not see the BMP order. Reassured patient that active BMP order is in his chart and as long as he presents to a [facility] he will not require a physical script.

## 2025-06-11 NOTE — TELEPHONE ENCOUNTER
Patient called in requesting to speak with Hyacinth, Relayed to the patient a message will be sent out, and she will give the patient a cb.     Pt cb: [phone]

## 2025-06-17 ENCOUNTER — RA CDI HCC (OUTPATIENT)
Dept: OTHER | Facility: HOSPITAL | Age: 70
End: 2025-06-17

## 2025-06-17 NOTE — PROGRESS NOTES
HCC coding opportunities       Chart reviewed, no opportunity found: CHART REVIEWED, NO OPPORTUNITY FOUND      I48.0 on PL and BPA- assessed 3/21/25  J43.2 on PL and BPA- assessed 3/21/25    [facility] AUDIT  Patients Insurance     Medicare Insurance: [employer]vantage

## 2025-06-19 ENCOUNTER — APPOINTMENT (OUTPATIENT)
Dept: LAB | Facility: CLINIC | Age: 70
End: 2025-06-19
Payer: COMMERCIAL

## 2025-06-19 LAB
ANION GAP SERPL CALCULATED.3IONS-SCNC: 7 MMOL/L (ref 4–13)
BUN SERPL-MCNC: 17 MG/DL (ref 5–25)
CALCIUM SERPL-MCNC: 8.9 MG/DL (ref 8.4–10.2)
CHLORIDE SERPL-SCNC: 105 MMOL/L (ref 96–108)
CO2 SERPL-SCNC: 25 MMOL/L (ref 21–32)
CREAT SERPL-MCNC: 0.71 MG/DL (ref 0.6–1.3)
GFR SERPL CREATININE-BSD FRML MDRD: 95 ML/MIN/1.73SQ M
GLUCOSE SERPL-MCNC: 103 MG/DL (ref 65–140)
POTASSIUM SERPL-SCNC: 4.2 MMOL/L (ref 3.5–5.3)
SODIUM SERPL-SCNC: 137 MMOL/L (ref 135–147)

## 2025-06-19 PROCEDURE — 80048 BASIC METABOLIC PNL TOTAL CA: CPT

## 2025-06-19 PROCEDURE — 36415 COLL VENOUS BLD VENIPUNCTURE: CPT

## 2025-06-24 ENCOUNTER — HOSPITAL ENCOUNTER (OUTPATIENT)
Facility: MEDICAL CENTER | Age: 70
Discharge: HOME | End: 2025-06-24
Payer: COMMERCIAL

## 2025-06-24 DIAGNOSIS — R97.20 ELEVATED PSA: ICD-10-CM

## 2025-06-24 PROCEDURE — 76377 3D RENDER W/INTRP POSTPROCES: CPT

## 2025-06-24 PROCEDURE — A9585 GADOBUTROL INJECTION: HCPCS

## 2025-06-24 PROCEDURE — 72197 MRI PELVIS W/O & W/DYE: CPT

## 2025-06-24 RX ORDER — GADOBUTROL 604.72 MG/ML
9 INJECTION INTRAVENOUS
Status: COMPLETED | OUTPATIENT
Start: 2025-06-24 | End: 2025-06-24

## 2025-06-24 RX ADMIN — GADOBUTROL 9 ML: 604.72 INJECTION INTRAVENOUS at 10:50

## 2025-06-30 ENCOUNTER — RESULTS FOLLOW-UP (OUTPATIENT)
Dept: UROLOGY | Facility: MEDICAL CENTER | Age: 70
End: 2025-06-30

## 2025-06-30 DIAGNOSIS — R93.1 ELEVATED CORONARY ARTERY CALCIUM SCORE: ICD-10-CM

## 2025-06-30 DIAGNOSIS — I11.9 HYPERTENSIVE HEART DISEASE WITHOUT HEART FAILURE: ICD-10-CM

## 2025-06-30 DIAGNOSIS — Z79.899 LONG TERM CURRENT USE OF ANTIARRHYTHMIC DRUG: ICD-10-CM

## 2025-06-30 DIAGNOSIS — I77.810 THORACIC AORTIC ECTASIA (HCC): ICD-10-CM

## 2025-06-30 DIAGNOSIS — E78.2 MIXED HYPERLIPIDEMIA: ICD-10-CM

## 2025-06-30 DIAGNOSIS — I10 ESSENTIAL HYPERTENSION, BENIGN: ICD-10-CM

## 2025-06-30 DIAGNOSIS — I48.0 PAROXYSMAL ATRIAL FIBRILLATION (HCC): ICD-10-CM

## 2025-06-30 DIAGNOSIS — R97.20 ELEVATED PSA: Primary | ICD-10-CM

## 2025-06-30 DIAGNOSIS — I48.0 PAROXYSMAL ATRIAL FIBRILLATION (HCC): Primary | ICD-10-CM

## 2025-06-30 RX ORDER — ATORVASTATIN CALCIUM 40 MG/1
40 TABLET, FILM COATED ORAL DAILY
Qty: 100 TABLET | Refills: 1 | Status: SHIPPED | OUTPATIENT
Start: 2025-06-30

## 2025-06-30 RX ORDER — LISINOPRIL 20 MG/1
20 TABLET ORAL 2 TIMES DAILY
Qty: 200 TABLET | Refills: 1 | Status: SHIPPED | OUTPATIENT
Start: 2025-06-30

## 2025-07-02 NOTE — TELEPHONE ENCOUNTER
Patient called requesting information on letter from his insurance regarding recent MRI not being covered. I explained to him about the 3D imaging that we write-off. He then asked for results of his MRI since no one called him with those results. I saw the note from Yessenia and gave him those results. Patient is aware to repeat PSA in 6 months. He asked if he should be switched from Flomax to Proscar to reduce prostate enlargement. I told him I would send it to the office and someone would get back to him with an answer.    CB: [phone]

## 2025-07-10 NOTE — TELEPHONE ENCOUNTER
Patient is requesting a lab work to be place so that he can have his labs done prior to his appt on 9/6/22
[DrArnoldo ___] : Dr. PATINO
[DrArnoldo ___] : Dr. PATINO

## 2025-07-21 ENCOUNTER — APPOINTMENT (OUTPATIENT)
Dept: RADIOLOGY | Facility: CLINIC | Age: 70
End: 2025-07-21
Payer: COMMERCIAL

## 2025-07-21 ENCOUNTER — OFFICE VISIT (OUTPATIENT)
Dept: FAMILY MEDICINE CLINIC | Facility: CLINIC | Age: 70
End: 2025-07-21
Payer: COMMERCIAL

## 2025-07-21 ENCOUNTER — APPOINTMENT (OUTPATIENT)
Dept: LAB | Facility: CLINIC | Age: 70
End: 2025-07-21
Payer: COMMERCIAL

## 2025-07-21 VITALS
OXYGEN SATURATION: 97 % | TEMPERATURE: 96.1 F | SYSTOLIC BLOOD PRESSURE: 100 MMHG | WEIGHT: 214 LBS | HEIGHT: 73 IN | HEART RATE: 64 BPM | BODY MASS INDEX: 28.36 KG/M2 | DIASTOLIC BLOOD PRESSURE: 62 MMHG

## 2025-07-21 DIAGNOSIS — M25.532 LEFT WRIST PAIN: ICD-10-CM

## 2025-07-21 DIAGNOSIS — Z79.01 LONG TERM CURRENT USE OF ANTICOAGULANT THERAPY: ICD-10-CM

## 2025-07-21 DIAGNOSIS — I11.9 HYPERTENSIVE HEART DISEASE WITHOUT HEART FAILURE: ICD-10-CM

## 2025-07-21 DIAGNOSIS — I48.0 PAROXYSMAL ATRIAL FIBRILLATION (HCC): ICD-10-CM

## 2025-07-21 DIAGNOSIS — M79.642 LEFT HAND PAIN: ICD-10-CM

## 2025-07-21 DIAGNOSIS — R60.0 LOCALIZED EDEMA: ICD-10-CM

## 2025-07-21 DIAGNOSIS — R93.1 ELEVATED CORONARY ARTERY CALCIUM SCORE: ICD-10-CM

## 2025-07-21 DIAGNOSIS — Z12.11 SCREENING FOR COLON CANCER: ICD-10-CM

## 2025-07-21 DIAGNOSIS — I10 ESSENTIAL HYPERTENSION: ICD-10-CM

## 2025-07-21 DIAGNOSIS — Z79.899 LONG TERM CURRENT USE OF ANTIARRHYTHMIC DRUG: ICD-10-CM

## 2025-07-21 DIAGNOSIS — M79.642 LEFT HAND PAIN: Primary | ICD-10-CM

## 2025-07-21 DIAGNOSIS — I77.810 THORACIC AORTIC ECTASIA (HCC): ICD-10-CM

## 2025-07-21 LAB
BASOPHILS # BLD AUTO: 0.07 THOUSANDS/ÂΜL (ref 0–0.1)
BASOPHILS NFR BLD AUTO: 1 % (ref 0–1)
BNP SERPL-MCNC: 139 PG/ML (ref 0–100)
D DIMER PPP FEU-MCNC: <0.27 UG/ML FEU
EOSINOPHIL # BLD AUTO: 0.11 THOUSAND/ÂΜL (ref 0–0.61)
EOSINOPHIL NFR BLD AUTO: 1 % (ref 0–6)
ERYTHROCYTE [DISTWIDTH] IN BLOOD BY AUTOMATED COUNT: 13.3 % (ref 11.6–15.1)
ERYTHROCYTE [SEDIMENTATION RATE] IN BLOOD: 16 MM/HOUR (ref 0–19)
HCT VFR BLD AUTO: 48.4 % (ref 36.5–49.3)
HGB BLD-MCNC: 15.7 G/DL (ref 12–17)
IMM GRANULOCYTES # BLD AUTO: 0.06 THOUSAND/UL (ref 0–0.2)
IMM GRANULOCYTES NFR BLD AUTO: 1 % (ref 0–2)
LYMPHOCYTES # BLD AUTO: 1.46 THOUSANDS/ÂΜL (ref 0.6–4.47)
LYMPHOCYTES NFR BLD AUTO: 13 % (ref 14–44)
MCH RBC QN AUTO: 30.7 PG (ref 26.8–34.3)
MCHC RBC AUTO-ENTMCNC: 32.4 G/DL (ref 31.4–37.4)
MCV RBC AUTO: 95 FL (ref 82–98)
MONOCYTES # BLD AUTO: 0.75 THOUSAND/ÂΜL (ref 0.17–1.22)
MONOCYTES NFR BLD AUTO: 7 % (ref 4–12)
NEUTROPHILS # BLD AUTO: 8.72 THOUSANDS/ÂΜL (ref 1.85–7.62)
NEUTS SEG NFR BLD AUTO: 77 % (ref 43–75)
NRBC BLD AUTO-RTO: 0 /100 WBCS
PLATELET # BLD AUTO: 343 THOUSANDS/UL (ref 149–390)
PMV BLD AUTO: 9.4 FL (ref 8.9–12.7)
RBC # BLD AUTO: 5.11 MILLION/UL (ref 3.88–5.62)
RHEUMATOID FACT SERPL-ACNC: <10 IU/ML
URATE SERPL-MCNC: 4.4 MG/DL (ref 3.5–8.5)
WBC # BLD AUTO: 11.17 THOUSAND/UL (ref 4.31–10.16)

## 2025-07-21 PROCEDURE — 86038 ANTINUCLEAR ANTIBODIES: CPT

## 2025-07-21 PROCEDURE — 85652 RBC SED RATE AUTOMATED: CPT

## 2025-07-21 PROCEDURE — 86225 DNA ANTIBODY NATIVE: CPT

## 2025-07-21 PROCEDURE — 85379 FIBRIN DEGRADATION QUANT: CPT

## 2025-07-21 PROCEDURE — 73130 X-RAY EXAM OF HAND: CPT

## 2025-07-21 PROCEDURE — 85025 COMPLETE CBC W/AUTO DIFF WBC: CPT

## 2025-07-21 PROCEDURE — 86618 LYME DISEASE ANTIBODY: CPT

## 2025-07-21 PROCEDURE — 36415 COLL VENOUS BLD VENIPUNCTURE: CPT

## 2025-07-21 PROCEDURE — 73110 X-RAY EXAM OF WRIST: CPT

## 2025-07-21 PROCEDURE — 86747 PARVOVIRUS ANTIBODY: CPT

## 2025-07-21 PROCEDURE — 86431 RHEUMATOID FACTOR QUANT: CPT

## 2025-07-21 PROCEDURE — 83880 ASSAY OF NATRIURETIC PEPTIDE: CPT

## 2025-07-21 PROCEDURE — 99214 OFFICE O/P EST MOD 30 MIN: CPT | Performed by: FAMILY MEDICINE

## 2025-07-21 PROCEDURE — 86617 LYME DISEASE ANTIBODY: CPT

## 2025-07-21 PROCEDURE — 84550 ASSAY OF BLOOD/URIC ACID: CPT

## 2025-07-21 RX ORDER — METHYLPREDNISOLONE 4 MG/1
TABLET ORAL
Qty: 1 EACH | Refills: 0 | Status: SHIPPED | OUTPATIENT
Start: 2025-07-21 | End: 2025-07-27

## 2025-07-21 NOTE — PROGRESS NOTES
Name: Sushil Harrison      : 1955      MRN: [id number]  Encounter Provider: Herminio Luna DO  Encounter Date: 2025   Encounter department: [facility] PRIMARY CARE  Return in 1 week if still with symptoms  :  Assessment & Plan  Left hand pain  X-ray ordered  Blood work ordered  Medrol Dosepak as ordered  Orders:  •  XR hand 3+ vw left; Future  •  XR wrist 3+ vw left; Future  •  methylPREDNISolone 4 MG tablet therapy pack; Use as directed on package  •  CBC and differential; Future  •  RHEUMATOID FACTOR; Future  •  Sedimentation rate, automated; Future  •  Uric acid; Future  •  Lyme Total AB W Reflex to IGM/IGG; Future  •  Parvovirus B19 antibody, IgG and IgM; Future  •  CASSIDY Screen w/Reflex Cascade; Future  •  D-dimer, quantitative; Future    Left wrist pain  As above  Orders:  •  XR hand 3+ vw left; Future  •  XR wrist 3+ vw left; Future  •  methylPREDNISolone 4 MG tablet therapy pack; Use as directed on package  •  CBC and differential; Future  •  RHEUMATOID FACTOR; Future  •  Sedimentation rate, automated; Future  •  Uric acid; Future  •  Lyme Total AB W Reflex to IGM/IGG; Future  •  Parvovirus B19 antibody, IgG and IgM; Future  •  CASSIDY Screen w/Reflex Cascade; Future  •  D-dimer, quantitative; Future    Localized edema  As above check D-dimer doubt DVT secondary to Eliquis therapy  Orders:  •  XR hand 3+ vw left; Future  •  XR wrist 3+ vw left; Future  •  methylPREDNISolone 4 MG tablet therapy pack; Use as directed on package  •  CBC and differential; Future  •  RHEUMATOID FACTOR; Future  •  Sedimentation rate, automated; Future  •  Uric acid; Future  •  Lyme Total AB W Reflex to IGM/IGG; Future  •  Parvovirus B19 antibody, IgG and IgM; Future  •  CASSIDY Screen w/Reflex Cascade; Future  •  D-dimer, quantitative; Future    Essential hypertension  Stable continue present therapy       Paroxysmal atrial fibrillation (HCC)  Status post cardioversion NSR by auscultation today       Long term  "current use of anticoagulant therapy  Patient is on Eliquis would be very unlikely a DVT to cause edema will check D-dimer       Screening for colon cancer  Cologuard ordered  Orders:  •  Cologuard           History of Present Illness   Week patient's left hand and wrist have been sore swollen and mildly erythematous.  Patient does not remember any insect bite or trauma to the area.  Using Tylenol with minimal relief.      Review of Systems   Constitutional: Negative.    HENT: Negative.     Eyes: Negative.    Respiratory: Negative.     Cardiovascular: Negative.    Gastrointestinal:         Patient requesting Cologuard ordered for colon cancer screening   Endocrine: Negative.    Genitourinary: Negative.    Musculoskeletal:         HPI   Skin:         PI   Allergic/Immunologic: Negative.    Neurological: Negative.    Hematological: Negative.    Psychiatric/Behavioral: Negative.         Objective   /62 (BP Location: Right arm, Patient Position: Sitting, Cuff Size: Large)   Pulse 64   Temp (!) 96.1 °F (35.6 °C)   Ht 6' 1\" (1.854 m)   Wt 97.1 kg (214 lb)   SpO2 97%   BMI 28.23 kg/m²      Physical Exam  Vitals and nursing note reviewed.   Constitutional:       Appearance: Normal appearance.   HENT:      Head: Normocephalic and atraumatic.      Mouth/Throat:      Mouth: Mucous membranes are moist.     Cardiovascular:      Rate and Rhythm: Normal rate and regular rhythm.      Heart sounds: Normal heart sounds.      Comments: Left upper extremity neurovascularly intact  Pulmonary:      Effort: Pulmonary effort is normal.      Breath sounds: Normal breath sounds.     Musculoskeletal:         General: Swelling and tenderness present.      Cervical back: No rigidity.      Comments: Trace edema dorsum left hand and wrist mild erythema negative induration     Skin:     General: Skin is warm and dry.      Findings: Erythema present.      Comments: As above     Neurological:      General: No focal deficit present.      " Mental Status: He is alert.      Motor: No weakness.     Psychiatric:         Mood and Affect: Mood normal.

## 2025-07-21 NOTE — PATIENT INSTRUCTIONS
Complete x-ray of left hand and wrist  Complete blood work today  Finish Medrol Dosepak as directed on pack  Please Cologuard for colon cancer screening  Return in 1 week if still with symptoms

## 2025-07-22 ENCOUNTER — TELEPHONE (OUTPATIENT)
Dept: FAMILY MEDICINE CLINIC | Facility: CLINIC | Age: 70
End: 2025-07-22

## 2025-07-22 PROBLEM — A69.20 LYME DISEASE: Status: ACTIVE | Noted: 2025-07-22

## 2025-07-22 LAB
B BURGDOR IGG SERPL QL IA: POSITIVE
B BURGDOR IGG+IGM SER QL IA: POSITIVE
B BURGDOR IGM SERPL QL IA: POSITIVE
B19V IGG SER IA-ACNC: 0.1 INDEX (ref 0–0.8)
B19V IGM SER IA-ACNC: 0.1 INDEX (ref 0–0.8)

## 2025-07-22 NOTE — TELEPHONE ENCOUNTER
Patient notified of his lab results. Patient wondering if his elevated white blood cells can be from a UTI due to his enlarged prostate? Please advise

## 2025-07-22 NOTE — TELEPHONE ENCOUNTER
Patient called the office requesting to speak to kerwin. Patient would like for kerwin to contact him back. Please review and advise.

## 2025-07-26 LAB
DSDNA IGG SERPL IA-ACNC: 2.9 IU/ML (ref ?–15)
NUCLEAR IGG SER IA-RTO: <0.09 RATIO (ref ?–1)

## 2025-07-30 ENCOUNTER — TELEPHONE (OUTPATIENT)
Dept: CARDIOLOGY CLINIC | Facility: CLINIC | Age: 70
End: 2025-07-30

## 2025-07-30 ENCOUNTER — TELEPHONE (OUTPATIENT)
Age: 70
End: 2025-07-30

## 2025-07-30 ENCOUNTER — TELEPHONE (OUTPATIENT)
Dept: ADMINISTRATIVE | Facility: HOSPITAL | Age: 70
End: 2025-07-30

## 2025-07-30 DIAGNOSIS — I10 ESSENTIAL HYPERTENSION, BENIGN: ICD-10-CM

## 2025-07-30 DIAGNOSIS — I48.0 PAROXYSMAL ATRIAL FIBRILLATION (HCC): ICD-10-CM

## 2025-07-30 RX ORDER — AMLODIPINE BESYLATE 5 MG/1
5 TABLET ORAL 2 TIMES DAILY
Qty: 200 TABLET | Refills: 1 | Status: SHIPPED | OUTPATIENT
Start: 2025-07-30

## 2025-07-31 ENCOUNTER — TELEPHONE (OUTPATIENT)
Dept: CARDIOLOGY CLINIC | Facility: CLINIC | Age: 70
End: 2025-07-31

## 2025-08-05 LAB — COLOGUARD RESULT REPORTABLE: POSITIVE
